# Patient Record
Sex: MALE | Race: WHITE | NOT HISPANIC OR LATINO | Employment: OTHER | URBAN - METROPOLITAN AREA
[De-identification: names, ages, dates, MRNs, and addresses within clinical notes are randomized per-mention and may not be internally consistent; named-entity substitution may affect disease eponyms.]

---

## 2017-01-05 NOTE — TELEPHONE ENCOUNTER
----- Message from Casper Duncan sent at 1/5/2017 12:03 PM CST -----  Contact: [phone]/self  Pt would like to speak with nurse regarding medication refills.   Please advise

## 2017-01-06 RX ORDER — METOPROLOL SUCCINATE 25 MG/1
25 TABLET, EXTENDED RELEASE ORAL DAILY
Qty: 30 TABLET | Refills: 11 | Status: SHIPPED | OUTPATIENT
Start: 2017-01-06 | End: 2017-03-28 | Stop reason: SDUPTHER

## 2017-01-06 RX ORDER — LISINOPRIL 10 MG/1
10 TABLET ORAL DAILY
Qty: 30 TABLET | Refills: 11 | Status: ON HOLD | OUTPATIENT
Start: 2017-01-06 | End: 2017-03-09 | Stop reason: HOSPADM

## 2017-01-09 ENCOUNTER — TELEPHONE (OUTPATIENT)
Dept: FAMILY MEDICINE | Facility: CLINIC | Age: 57
End: 2017-01-09

## 2017-01-09 NOTE — TELEPHONE ENCOUNTER
----- Message from Jenna Ladd sent at 1/9/2017  9:57 AM CST -----  Contact: [phone] Donita miller/ [facility]  Requesting to speak with you regarding verifying drug.    Would like to know if the drug is metoprolol XL or metoprolol plain.    Please advise.

## 2017-01-09 NOTE — TELEPHONE ENCOUNTER
Call returned to [facility] pharmacy.  Nurse spoke with Donita to confirm script for pt's Metformin.

## 2017-01-22 DIAGNOSIS — K51.30 ULCERATIVE RECTOSIGMOIDITIS WITHOUT COMPLICATION: ICD-10-CM

## 2017-01-22 RX ORDER — MESALAMINE 375 MG/1
CAPSULE, EXTENDED RELEASE ORAL
Qty: 120 CAPSULE | Refills: 6 | Status: SHIPPED | OUTPATIENT
Start: 2017-01-22 | End: 2018-10-30

## 2017-02-09 RX ORDER — GLIPIZIDE 10 MG/1
TABLET ORAL
Qty: 60 TABLET | Refills: 11 | Status: SHIPPED | OUTPATIENT
Start: 2017-02-09 | End: 2018-02-28 | Stop reason: SDUPTHER

## 2017-02-17 ENCOUNTER — HOSPITAL ENCOUNTER (OUTPATIENT)
Dept: DIABETES | Facility: HOSPITAL | Age: 57
Discharge: HOME | End: 2017-02-17

## 2017-02-20 ENCOUNTER — TELEPHONE (OUTPATIENT)
Dept: FAMILY MEDICINE | Facility: CLINIC | Age: 57
End: 2017-02-20

## 2017-02-20 NOTE — TELEPHONE ENCOUNTER
----- Message from Patsy Denise sent at 2/20/2017  9:02 AM CST -----  Contact: self, [phone]  Patient requests to be seen sooner than the next available appointment, states he is having uneasiness feeling on his chest and states his blood pressure medication was changed. Please advise.

## 2017-02-20 NOTE — TELEPHONE ENCOUNTER
Call returned to pt.  Pt states that on yesterday he had a ticklish feeling in chest that lasted only 5-10 mins.  Pt stated that he hasn't had any pain.  Pt was offered an appt on today but he declined and stated that he'll call on tomorrow to get visit with Dr. Alexis.

## 2017-02-21 NOTE — TELEPHONE ENCOUNTER
Call returned to pt.  Pt stated that he has had a little chest pains a few days ago and would like to be seen as soon as possible.  Pt has an appt with Dr. Pitts on tomorrow.

## 2017-02-21 NOTE — TELEPHONE ENCOUNTER
----- Message from Jenny Lea sent at 2/21/2017  1:33 PM CST -----  Contact: [phone]/ self   Patient would like to be seen sooner than the next available appointment. Please advise.

## 2017-02-22 ENCOUNTER — OFFICE VISIT (OUTPATIENT)
Dept: INTERNAL MEDICINE | Facility: CLINIC | Age: 57
End: 2017-02-22
Payer: MEDICAID

## 2017-02-22 ENCOUNTER — HOSPITAL ENCOUNTER (INPATIENT)
Facility: HOSPITAL | Age: 57
LOS: 1 days | Discharge: HOME | DRG: 311 | End: 2017-02-23
Attending: EMERGENCY MEDICINE | Admitting: INTERNAL MEDICINE
Payer: MEDICAID

## 2017-02-22 VITALS
BODY MASS INDEX: 25.97 KG/M2 | SYSTOLIC BLOOD PRESSURE: 153 MMHG | HEART RATE: 88 BPM | DIASTOLIC BLOOD PRESSURE: 81 MMHG | HEIGHT: 66 IN | WEIGHT: 161.63 LBS | OXYGEN SATURATION: 97 %

## 2017-02-22 DIAGNOSIS — R07.9 CHEST PAIN: ICD-10-CM

## 2017-02-22 DIAGNOSIS — R07.9 CHEST PAIN, UNSPECIFIED TYPE: Primary | ICD-10-CM

## 2017-02-22 DIAGNOSIS — R07.2 PRECORDIAL PAIN: Primary | ICD-10-CM

## 2017-02-22 DIAGNOSIS — I20.0 UNSTABLE ANGINA: ICD-10-CM

## 2017-02-22 DIAGNOSIS — I10 ESSENTIAL HYPERTENSION: ICD-10-CM

## 2017-02-22 DIAGNOSIS — E11.9 DIABETES MELLITUS TYPE 2, NONINSULIN DEPENDENT: ICD-10-CM

## 2017-02-22 LAB
ALBUMIN SERPL BCP-MCNC: 4.1 G/DL
ALP SERPL-CCNC: 90 U/L
ALT SERPL W/O P-5'-P-CCNC: 16 U/L
AMPHET+METHAMPHET UR QL: NEGATIVE
ANION GAP SERPL CALC-SCNC: 13 MMOL/L
APTT BLDCRRT: 27.7 SEC
AST SERPL-CCNC: 12 U/L
BARBITURATES UR QL SCN>200 NG/ML: NEGATIVE
BASOPHILS # BLD AUTO: 0.04 K/UL
BASOPHILS NFR BLD: 0.4 %
BENZODIAZ UR QL SCN>200 NG/ML: NEGATIVE
BILIRUB SERPL-MCNC: 0.4 MG/DL
BUN SERPL-MCNC: 12 MG/DL
BZE UR QL SCN: NEGATIVE
CALCIUM SERPL-MCNC: 9.7 MG/DL
CANNABINOIDS UR QL SCN: NEGATIVE
CHLORIDE SERPL-SCNC: 100 MMOL/L
CK MB SERPL-MCNC: 1.9 NG/ML
CK MB SERPL-RTO: 2.8 %
CK SERPL-CCNC: 68 U/L
CK SERPL-CCNC: 68 U/L
CO2 SERPL-SCNC: 24 MMOL/L
CREAT SERPL-MCNC: 1 MG/DL
CREAT UR-MCNC: 30 MG/DL
DIFFERENTIAL METHOD: ABNORMAL
EOSINOPHIL # BLD AUTO: 0.2 K/UL
EOSINOPHIL NFR BLD: 2.2 %
ERYTHROCYTE [DISTWIDTH] IN BLOOD BY AUTOMATED COUNT: 12.9 %
EST. GFR  (AFRICAN AMERICAN): >60 ML/MIN/1.73 M^2
EST. GFR  (NON AFRICAN AMERICAN): >60 ML/MIN/1.73 M^2
ETHANOL UR-MCNC: <10 MG/DL
GLUCOSE SERPL-MCNC: 216 MG/DL
HCT VFR BLD AUTO: 39.4 %
HGB BLD-MCNC: 13 G/DL
INR PPP: 1
LYMPHOCYTES # BLD AUTO: 2.4 K/UL
LYMPHOCYTES NFR BLD: 27.3 %
MCH RBC QN AUTO: 26.4 PG
MCHC RBC AUTO-ENTMCNC: 33 %
MCV RBC AUTO: 80 FL
METHADONE UR QL SCN>300 NG/ML: NEGATIVE
MONOCYTES # BLD AUTO: 0.6 K/UL
MONOCYTES NFR BLD: 6.8 %
NEUTROPHILS # BLD AUTO: 5.6 K/UL
NEUTROPHILS NFR BLD: 63.1 %
OPIATES UR QL SCN: NEGATIVE
PCP UR QL SCN>25 NG/ML: NEGATIVE
PLATELET # BLD AUTO: 287 K/UL
PMV BLD AUTO: 10.5 FL
POCT GLUCOSE: 141 MG/DL (ref 70–110)
POTASSIUM SERPL-SCNC: 3.9 MMOL/L
PROT SERPL-MCNC: 8.4 G/DL
PROTHROMBIN TIME: 10.5 SEC
RBC # BLD AUTO: 4.92 M/UL
SODIUM SERPL-SCNC: 137 MMOL/L
TOXICOLOGY INFORMATION: NORMAL
TROPONIN I SERPL DL<=0.01 NG/ML-MCNC: <0.006 NG/ML
TROPONIN I SERPL DL<=0.01 NG/ML-MCNC: <0.006 NG/ML
TSH SERPL DL<=0.005 MIU/L-ACNC: 2.53 UIU/ML
WBC # BLD AUTO: 8.94 K/UL

## 2017-02-22 PROCEDURE — 82553 CREATINE MB FRACTION: CPT

## 2017-02-22 PROCEDURE — 63600175 PHARM REV CODE 636 W HCPCS: Performed by: STUDENT IN AN ORGANIZED HEALTH CARE EDUCATION/TRAINING PROGRAM

## 2017-02-22 PROCEDURE — 84443 ASSAY THYROID STIM HORMONE: CPT

## 2017-02-22 PROCEDURE — G0378 HOSPITAL OBSERVATION PER HR: HCPCS

## 2017-02-22 PROCEDURE — 84484 ASSAY OF TROPONIN QUANT: CPT

## 2017-02-22 PROCEDURE — 25000003 PHARM REV CODE 250: Performed by: STUDENT IN AN ORGANIZED HEALTH CARE EDUCATION/TRAINING PROGRAM

## 2017-02-22 PROCEDURE — 99213 OFFICE O/P EST LOW 20 MIN: CPT | Mod: PBBFAC,PO | Performed by: INTERNAL MEDICINE

## 2017-02-22 PROCEDURE — 93005 ELECTROCARDIOGRAM TRACING: CPT

## 2017-02-22 PROCEDURE — 84484 ASSAY OF TROPONIN QUANT: CPT | Mod: 91

## 2017-02-22 PROCEDURE — 80307 DRUG TEST PRSMV CHEM ANLYZR: CPT

## 2017-02-22 PROCEDURE — 93010 ELECTROCARDIOGRAM REPORT: CPT | Mod: ,,, | Performed by: INTERNAL MEDICINE

## 2017-02-22 PROCEDURE — 99285 EMERGENCY DEPT VISIT HI MDM: CPT | Mod: 27

## 2017-02-22 PROCEDURE — 85730 THROMBOPLASTIN TIME PARTIAL: CPT

## 2017-02-22 PROCEDURE — 80053 COMPREHEN METABOLIC PANEL: CPT

## 2017-02-22 PROCEDURE — 99999 PR PBB SHADOW E&M-EST. PATIENT-LVL III: CPT | Mod: PBBFAC,,, | Performed by: INTERNAL MEDICINE

## 2017-02-22 PROCEDURE — 11000001 HC ACUTE MED/SURG PRIVATE ROOM

## 2017-02-22 PROCEDURE — 93010 ELECTROCARDIOGRAM REPORT: CPT | Mod: 76,,, | Performed by: INTERNAL MEDICINE

## 2017-02-22 PROCEDURE — 85025 COMPLETE CBC W/AUTO DIFF WBC: CPT

## 2017-02-22 PROCEDURE — 36415 COLL VENOUS BLD VENIPUNCTURE: CPT

## 2017-02-22 PROCEDURE — 85610 PROTHROMBIN TIME: CPT

## 2017-02-22 PROCEDURE — 99499 UNLISTED E&M SERVICE: CPT | Mod: S$PBB,,, | Performed by: INTERNAL MEDICINE

## 2017-02-22 RX ORDER — METOPROLOL SUCCINATE 25 MG/1
25 TABLET, EXTENDED RELEASE ORAL DAILY
Status: CANCELLED | OUTPATIENT
Start: 2017-02-23

## 2017-02-22 RX ORDER — ENOXAPARIN SODIUM 150 MG/ML
1 INJECTION SUBCUTANEOUS ONCE
Status: COMPLETED | OUTPATIENT
Start: 2017-02-22 | End: 2017-02-22

## 2017-02-22 RX ORDER — INSULIN ASPART 100 [IU]/ML
1-10 INJECTION, SOLUTION INTRAVENOUS; SUBCUTANEOUS
Status: DISCONTINUED | OUTPATIENT
Start: 2017-02-22 | End: 2017-02-23 | Stop reason: HOSPADM

## 2017-02-22 RX ORDER — IBUPROFEN 200 MG
24 TABLET ORAL
Status: DISCONTINUED | OUTPATIENT
Start: 2017-02-22 | End: 2017-02-23 | Stop reason: HOSPADM

## 2017-02-22 RX ORDER — NITROGLYCERIN 0.4 MG/1
0.4 TABLET SUBLINGUAL EVERY 5 MIN PRN
Status: DISCONTINUED | OUTPATIENT
Start: 2017-02-22 | End: 2017-02-23 | Stop reason: HOSPADM

## 2017-02-22 RX ORDER — CLOPIDOGREL BISULFATE 75 MG/1
75 TABLET ORAL DAILY
Status: DISCONTINUED | OUTPATIENT
Start: 2017-02-22 | End: 2017-02-23 | Stop reason: HOSPADM

## 2017-02-22 RX ORDER — LISINOPRIL 5 MG/1
10 TABLET ORAL DAILY
Status: DISCONTINUED | OUTPATIENT
Start: 2017-02-23 | End: 2017-02-23 | Stop reason: HOSPADM

## 2017-02-22 RX ORDER — PANTOPRAZOLE SODIUM 40 MG/1
40 TABLET, DELAYED RELEASE ORAL DAILY
Status: DISCONTINUED | OUTPATIENT
Start: 2017-02-23 | End: 2017-02-23 | Stop reason: HOSPADM

## 2017-02-22 RX ORDER — GLUCAGON 1 MG
1 KIT INJECTION
Status: DISCONTINUED | OUTPATIENT
Start: 2017-02-22 | End: 2017-02-23 | Stop reason: HOSPADM

## 2017-02-22 RX ORDER — ATORVASTATIN CALCIUM 20 MG/1
20 TABLET, FILM COATED ORAL NIGHTLY
Status: DISCONTINUED | OUTPATIENT
Start: 2017-02-22 | End: 2017-02-23 | Stop reason: HOSPADM

## 2017-02-22 RX ORDER — IBUPROFEN 200 MG
16 TABLET ORAL
Status: DISCONTINUED | OUTPATIENT
Start: 2017-02-22 | End: 2017-02-23 | Stop reason: HOSPADM

## 2017-02-22 RX ADMIN — CLOPIDOGREL BISULFATE 75 MG: 75 TABLET ORAL at 10:02

## 2017-02-22 RX ADMIN — ENOXAPARIN SODIUM 80 MG: 100 INJECTION SUBCUTANEOUS at 09:02

## 2017-02-22 RX ADMIN — ATORVASTATIN CALCIUM 20 MG: 20 TABLET, FILM COATED ORAL at 10:02

## 2017-02-22 NOTE — ED NOTES
APPEARANCE: Alert, oriented and in no acute distress.  CARDIAC: Normal rate and rhythm, no murmur heard.   PERIPHERAL VASCULAR: peripheral pulses present. Normal cap refill. No edema. Warm to touch.    RESPIRATORY:Normal rate and effort, breath sounds clear bilaterally throughout chest. Respirations are equal and unlabored no obvious signs of distress.  GASTRO: soft, bowel sounds normal, no tenderness, no abdominal distention.  MUSC: Full ROM. No bony tenderness or soft tissue tenderness. No obvious deformity. Left upper chest pain x 3 days.   SKIN: Skin is warm and dry, normal skin turgor, mucous membranes moist.  NEURO: 5/5 strength major flexors/extensors bilaterally. Sensory intact to light touch bilaterally. Torrie coma scale: eyes open spontaneously-4, oriented & converses-5, obeys commands-6. No neurological abnormalities.   MENTAL STATUS: awake, alert and aware of environment.  EYE: PERRL, both eyes: pupils brisk and reactive to light. Normal size.  ENT: EARS: no obvious drainage. NOSE: no active bleeding.

## 2017-02-22 NOTE — ED TRIAGE NOTES
"Pt presents to ED today c/o left upper chest pain x 3 days. Reports started with numbness now "pressure" pain. Denies N/V or diaphoresis.   "

## 2017-02-22 NOTE — IP AVS SNAPSHOT
[facility]  [address]  [place] [address]  Phone: [phone]           Patient Discharge Instructions     Our goal is to set you up for success. This packet includes information on your condition, medications, and your home care. It will help you to care for yourself so you don't get sicker and need to go back to the hospital.     Please ask your nurse if you have any questions.        There are many details to remember when preparing to leave the hospital. Here is what you will need to do:    1. Take your medicine. If you are prescribed medications, review your Medication List in the following pages. You may have new medications to  at the pharmacy and others that you'll need to stop taking. Review the instructions for how and when to take your medications. Talk with your doctor or nurses if you are unsure of what to do.     2. Go to your follow-up appointments. Specific follow-up information is listed in the following pages. Your may be contacted by a transition nurse or clinical provider about future appointments. Be sure we have all of the phone numbers to reach you, if needed. Please contact your provider's office if you are unable to make an appointment.     3. Watch for warning signs. Your doctor or nurse will give you detailed warning signs to watch for and when to call for assistance. These instructions may also include educational information about your condition. If you experience any of warning signs to your health, call your doctor.               ** Verify the list of medication(s) below is accurate and up to date. Carry this with you in case of emergency. If your medications have changed, please notify your healthcare provider.             Medication List      START taking these medications        Additional Info                      atorvastatin 20 MG tablet   Commonly known as:  LIPITOR   Quantity:  90 tablet   Refills:  3   Dose:  20 mg    Last time this was given:   20 mg on 2/22/2017 10:00 PM   Instructions:  Take 1 tablet (20 mg total) by mouth every evening.     Begin Date    AM    Noon    PM    Bedtime       clopidogrel 75 mg tablet   Commonly known as:  PLAVIX   Quantity:  30 tablet   Refills:  11   Dose:  75 mg    Last time this was given:  75 mg on 2/23/2017  8:27 AM   Instructions:  Take 1 tablet (75 mg total) by mouth once daily.     Begin Date    AM    Noon    PM    Bedtime       nitroGLYCERIN 0.4 MG SL tablet   Commonly known as:  NITROSTAT   Quantity:  30 tablet   Refills:  1   Dose:  0.4 mg    Instructions:  Place 1 tablet (0.4 mg total) under the tongue every 5 (five) minutes as needed for Chest pain.     Begin Date    AM    Noon    PM    Bedtime         CONTINUE taking these medications        Additional Info                      APRISO 0.375 gram Cp24   Quantity:  120 capsule   Refills:  6   Generic drug:  mesalamine    Instructions:  TAKE FOUR CAPSULES BY MOUTH ONCE DAILY     Begin Date    AM    Noon    PM    Bedtime       blood sugar diagnostic Strp   Commonly known as:  BLOOD GLUCOSE TEST   Quantity:  100 strip   Refills:  11    Instructions:  Dispense 1 meter and 100 test strips (insurance covered brand). Test sugar daily     Begin Date    AM    Noon    PM    Bedtime       glipiZIDE 10 MG tablet   Commonly known as:  GLUCOTROL   Quantity:  60 tablet   Refills:  11    Instructions:  TAKE ONE TABLET BY MOUTH TWICE DAILY WITH MEALS     Begin Date    AM    Noon    PM    Bedtime       lisinopril 10 MG tablet   Quantity:  30 tablet   Refills:  11   Dose:  10 mg    Last time this was given:  10 mg on 2/23/2017  8:27 AM   Instructions:  Take 1 tablet (10 mg total) by mouth once daily.     Begin Date    AM    Noon    PM    Bedtime       metformin 1000 MG tablet   Commonly known as:  GLUCOPHAGE   Quantity:  60 tablet   Refills:  11   Dose:  1000 mg    Instructions:  Take 1 tablet (1,000 mg total) by mouth 2 (two) times daily with meals.     Begin Date    AM    Noon     PM    Bedtime       metoprolol succinate 25 MG 24 hr tablet   Commonly known as:  TOPROL-XL   Quantity:  30 tablet   Refills:  11   Dose:  25 mg    Instructions:  Take 1 tablet (25 mg total) by mouth once daily.     Begin Date    AM    Noon    PM    Bedtime       pantoprazole 40 MG tablet   Commonly known as:  PROTONIX   Refills:  0    Last time this was given:  40 mg on 2/23/2017  8:27 AM     Begin Date    AM    Noon    PM    Bedtime         STOP taking these medications     omeprazole 40 MG capsule   Commonly known as:  PRILOSEC            Where to Get Your Medications      You can get these medications from any pharmacy     Bring a paper prescription for each of these medications     atorvastatin 20 MG tablet    clopidogrel 75 mg tablet    nitroGLYCERIN 0.4 MG SL tablet                  Please bring to all follow up appointments:    1. A copy of your discharge instructions.  2. All medicines you are currently taking in their original bottles.  3. Identification and insurance card.    Please arrive 15 minutes ahead of scheduled appointment time.    Please call 24 hours in advance if you must reschedule your appointment and/or time.        Follow-up Information     Follow up with Facundo Alexis MD.    Specialty:  Family Medicine    Why:  message left for follow up within a week    Contact information:    [address]  [address]  [place] [id number]  [phone]          Follow up with Pamela Espitia MD In 1 week.    Specialty:  Cardiology    Contact information:    [address]  [address]  [place] [id number]  [phone]          Discharge Instructions     Future Orders    Activity as tolerated     Call MD for:  difficulty breathing or increased cough     Call MD for:  persistent dizziness, light-headedness, or visual disturbances     Call MD for:  severe uncontrolled pain     Diet Cardiac     Diet Diabetic 1800 Calories       Discharge References/Attachments     ANGINA, DISCHARGE INSTRUCTIONS FOR  "(ENGLISH)    ANGINA, UNSTABLE (ENGLISH)    ANGINA, WHAT IS (ENGLISH)    CHEST PAIN, UNCERTAIN CAUSE (CHILD) (ENGLISH)    CHEST PAIN, UNCERTAIN CAUSE (ENGLISH)    ULCERATIVE COLITIS (ENGLISH)    HEART ATTACK OR ANGINA, RECOGNIZING A (ENGLISH)    ATORVASTATIN TABLETS (ENGLISH)    CLOPIDOGREL BISULFATE ORAL TABLET (ENGLISH)    NITROGLYCERIN SUBLINGUAL TABLETS (ENGLISH)        Primary Diagnosis     Your primary diagnosis was:  Unstable Chest Pain Due To Insufficient Blood Supply To Heart      Admission Information     Date & Time Provider Department CSN    2/22/2017  1:46 PM Willis Whitaker MD [facility] [id number]      Care Providers     Provider Role Specialty Primary office phone    Willis Whitaker MD Attending Provider Internal Medicine [phone]    Fercho Beck MD Team Attending  Internal Medicine [phone]    Willis Whitaker MD Team Attending  Internal Medicine [phone]      Your Vitals Were     BP Pulse Temp Resp Height Weight    137/74 (BP Location: Left arm, Patient Position: Sitting, BP Method: Automatic) 65 96.9 °F (36.1 °C) (Oral) 20 5' 6" (1.676 m) 73 kg (161 lb)    SpO2 BMI             100% 25.99 kg/m2         Recent Lab Values        12/11/2015 11/15/2016 2/23/2017                     9:55 AM 11:38 AM  7:11 AM         A1C 8.7 (H) 12.2 (H) 9.5 (H)         Comment for A1C at 11:38 AM on 11/15/2016:  According to ADA guidelines, hemoglobin A1C <7.0% represents  optimal control in non-pregnant diabetic patients.  Different  metrics may apply to specific populations.   Standards of Medical Care in Diabetes - 2016.  For the purpose of screening for the presence of diabetes:  <5.7%     Consistent with the absence of diabetes  5.7-6.4%  Consistent with increasing risk for diabetes   (prediabetes)  >or=6.5%  Consistent with diabetes  Currently no consensus exists for use of hemoglobin A1C  for diagnosis of diabetes for children.      Comment for A1C at  7:11 AM on 2/23/2017:  " According to ADA guidelines, hemoglobin A1C <7.0% represents  optimal control in non-pregnant diabetic patients.  Different  metrics may apply to specific populations.   Standards of Medical Care in Diabetes - 2016.  For the purpose of screening for the presence of diabetes:  <5.7%     Consistent with the absence of diabetes  5.7-6.4%  Consistent with increasing risk for diabetes   (prediabetes)  >or=6.5%  Consistent with diabetes  Currently no consensus exists for use of hemoglobin A1C  for diagnosis of diabetes for children.        Allergies as of 2/23/2017        Reactions    Aspirin       [facility]     [facility] Nurse Care Line - 24/7 Assistance  Unless otherwise directed by your provider, please contact [facility], our nurse care line that is available for 24/7 assistance.     Registered nurses in the [facility] provide clinical advisement, health education, appointment booking, and other advisory services.  Call for this free service at [phone].        Advance Directives     An advance directive is a document which, in the event you are no longer able to make decisions for yourself, tells your healthcare team what kind of treatment you do or do not want to receive, or who you would like to make those decisions for you.  If you do not currently have an advance directive, [facility] encourages you to create one.  For more information call:  [phone] ([phone]), [phone] ([phone]),  or log on to [email or web address].        Smoking Cessation     If you would like to quit smoking:   You may be eligible for free services if you are a [place] resident and started smoking cigarettes before September 1, 1988.  Call the Smoking Cessation Trust (SCT) toll free at (803) 054-4869 or (208) 589-3509.   Call 6-084-QUIT-NOW if you do not meet the above criteria.            Language Assistance Services     ATTENTION: Language assistance services are available, free of  charge. Please call [phone].      ATENCIÓN: Si habla español, tiene a posadas disposición servicios gratuitos de asistencia lingüística. Llame al [phone].     CHÚ Ý: N?u b?n nói Ti?ng Vi?t, có các d?ch v? h? tr? ngôn ng? mi?n phí dành cho b?n. G?i s? [phone].        Diabetes Discharge Instructions                                    [facility] complies with applicable Federal civil rights laws and does not discriminate on the basis of race, color, national origin, age, disability, or sex.

## 2017-02-22 NOTE — PROGRESS NOTES
Subjective:    Portions of this note are generated with voice recognition software. Typographical errors may exist.   Patient ID: Michoacano Lind is a 56 y.o. male.    Chief Complaint: Chest Pain (ongoing for 3 days)    HPI: 56-year-old male with uncontrolled diabetes here for complains of pressure sensation in the left precordial area on and off for the past 3 days.  It is worsened on exertion and relieved with rest.  The patient has been doing work around his house like painting for the past month.  He relates the sensation to the exertion.  No history of nausea or vomiting no history of diaphoresis.  No history of radiating pain.  No history of dizziness or syncope.  No history of shortness of breath.    Review of patient's allergies indicates:   Allergen Reactions    Aspirin      Past Medical History   Diagnosis Date    Diabetes mellitus, type II     HTN (hypertension)     Ulcerative colitis      Past Surgical History   Procedure Laterality Date    Cataract extraction Bilateral 2013    Colonoscopy N/A 1/22/2016     Procedure: COLONOSCOPY;  Surgeon: Aristeo Lynn Jr., MD;  Location: [facility];  Service: Endoscopy;  Laterality: N/A;     Family History   Problem Relation Age of Onset    Coronary artery disease Mother 75    Coronary artery disease Father 55    Coronary artery disease Brother 59    Diabetes Brother     Prostate cancer Neg Hx     Colon cancer Neg Hx      Social History     Social History    Marital status:      Spouse name: N/A    Number of children: N/A    Years of education: N/A     Occupational History    Not on file.     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   HEAD: Normocephalic, atraumatic.  EYES: PERRL. EOMI. Conjunctivae noninjected.  EARS: TM's intact. Light reflex normal. No retraction or perforation  NOSE: Mucosa pink. Airway clear.  MOUTH & THROAT: No tonsillar enlargement. No pharyngeal erythema or exudate.   NECK: Supple with no cervical lymphadenopathy. No carotid bruits. No thyromegaly  CHEST: Good inspiratory effort. Lungs clear to auscultation with no wheezes or crackles.  CARDIOVASCULAR: Normal S1, S2. No rubs, murmurs, or gallops.  ABDOMEN: Bowel sounds normal. Not distended. Soft. No tenderness or masses. No organomegaly.  EXTREMITIES: No edema, cyanosis, or clubbing.     Assessment:     Chest pain: And needs to be ruled out for acute coronary syndrome.  Will refer to emergency room for further management.  Informed the emergency room

## 2017-02-22 NOTE — H&P
[facility] Internal Medicine History and Physical - Resident Note    Admitting Team: [facility] Medicine Hospitalist Team B  Attending Physician: Sherman   Resident: Henry  Interns: Juan     Date of Admit: 2017    Chief Complaint     Intermittent chest pain x 3 days    Subjective:      History of Present Illness:  Michoacano Lind is a 56 y.o.  male with uncontrolled diabetes mellitus - type 2 and HTN was in his USOH (performs all adls, very active) until three days ago he noticed he would have left sided chest pain that he would describe as a heaviness and pressure pain. Pain would not radiate to jaw or left arm. Pain occurred with exertion and while at rest. Pain would not last long (he was unable to give exact time). Pain was not reproducible or did not worsen with movement or inspiration. Pain was not associated with shortness of breath, nausea or vomiting. He denies recent airplane trips. He denies swelling or pain to calves. He has never had this pain before so he saw a primary care doctor who sent him to the ED. His father had a history of a MI at age 55 which he  from. His brother has CAD at age of 59. He is a former smoker (quit many years ago). At time of presentation, patient did not have any chest pain.     Past Medical History:  Past Medical History   Diagnosis Date    Diabetes mellitus, type II     HTN (hypertension)     Ulcerative colitis        Past Surgical History:  Past Surgical History   Procedure Laterality Date    Cataract extraction Bilateral     Colonoscopy N/A 2016     Procedure: COLONOSCOPY;  Surgeon: Aristeo Lynn Jr., MD;  Location: [facility];  Service: Endoscopy;  Laterality: N/A;       Allergies:  Review of patient's allergies indicates:   Allergen Reactions    Aspirin        Home Medications:  Prior to Admission medications    Medication Sig Start Date End Date Taking? Authorizing Provider   APRISO 0.375 gram Cp24 TAKE FOUR CAPSULES BY MOUTH ONCE DAILY 17    Aristeo Lynn Jr., MD   blood sugar diagnostic (BLOOD GLUCOSE TEST) Strp Dispense 1 meter and 100 test strips (insurance covered brand). Test sugar daily 16   Facundo Alexis MD   glipiZIDE (GLUCOTROL) 10 MG tablet TAKE ONE TABLET BY MOUTH TWICE DAILY WITH MEALS 17   Facundo Alexis MD   lisinopril 10 MG tablet Take 1 tablet (10 mg total) by mouth once daily. 17   Facundo Alexis MD   metformin (GLUCOPHAGE) 1000 MG tablet Take 1 tablet (1,000 mg total) by mouth 2 (two) times daily with meals. 16   Facundo Alexis MD   metoprolol succinate (TOPROL-XL) 25 MG 24 hr tablet Take 1 tablet (25 mg total) by mouth once daily. 17   Facundo Alexis MD   omeprazole (PRILOSEC) 40 MG capsule Take 1 capsule (40 mg total) by mouth once daily. 16  Aristeo Lynn Jr., MD   pantoprazole (PROTONIX) 40 MG tablet  10/31/16   Historical Provider, MD       Family History:  Family History   Problem Relation Age of Onset    Coronary artery disease Mother 75    Coronary artery disease Father 55    Coronary artery disease Brother 59    Diabetes Brother     Prostate cancer Neg Hx     Colon cancer Neg Hx        Social History:  Social History   Substance Use Topics    Smoking status: Former Smoker    Smokeless tobacco: None    Alcohol use 0.0 oz/week     0 Standard drinks or equivalent per week      Comment: occasionally       Review of Systems:  Pertinent items are noted in HPI. All other systems are reviewed and are negative.    Health Maintaince :   Primary Care Physician: Vanesa  Immunizations:   TDap is up to date, .  Influenza is up to date, .  Pneumovax is up to date, .  Cancer Screening:  Colonoscopy: is up to date.      Objective:   Last 24 Hour Vital Signs:  BP  Min: 127/69  Max: 153/81  Temp  Av.1 °F (36.7 °C)  Min: 98.1 °F (36.7 °C)  Max: 98.1 °F (36.7 °C)  Pulse  Av  Min: 75  Max: 92  Resp  Av  Min: 18  Max: 18  SpO2  Av %  Min: 97 %   "Max: 100 %  Height  Av' 6" (167.6 cm)  Min: 5' 6" (167.6 cm)  Max: 5' 6" (167.6 cm)  Weight  Av.2 kg (161 lb 4.8 oz)  Min: 73 kg (161 lb)  Max: 73.3 kg (161 lb 9.6 oz)  Body mass index is 25.99 kg/(m^2).       Physical Examination:  Gen: pleasant, sitting up in bed, NAD  HEENT: NCAT, MMM, EOMI, PERRL, JVP 5 cm  CV: RRR, no murmurs  Resp: CTA B, no increased wob, no crackles  Abd: soft, NT/ND  Ext: compartments soft, NT, no swelling, 2+ dp pulses  Skin: no rashes    Laboratory:  Most Recent Data:  CBC: Lab Results   Component Value Date    WBC 8.94 2017    HGB 13.0 (L) 2017    HCT 39.4 (L) 2017     2017    MCV 80 (L) 2017    RDW 12.9 2017     BMP: Lab Results   Component Value Date     2017    K 3.9 2017     2017    CO2 24 2017    BUN 12 2017    CREATININE 1.0 2017     (H) 2017    CALCIUM 9.7 2017     LFTs: Lab Results   Component Value Date    PROT 8.4 2017    ALBUMIN 4.1 2017    BILITOT 0.4 2017    AST 12 2017    ALKPHOS 90 2017    ALT 16 2017     Coags:   Lab Results   Component Value Date    INR 1.0 2017     FLP: Lab Results   Component Value Date    CHOL 215 (H) 11/15/2016    HDL 38 (L) 11/15/2016    LDLCALC 123.6 11/15/2016    TRIG 267 (H) 11/15/2016    CHOLHDL 17.7 (L) 11/15/2016     DM: Lab Results   Component Value Date    HGBA1C 12.2 (H) 11/15/2016    HGBA1C 8.7 (H) 2015    LDLCALC 123.6 11/15/2016    CREATININE 1.0 2017     Thyroid: Lab Results   Component Value Date    TSH 1.678 11/15/2016     Cardiac: Lab Results   Component Value Date    TROPONINI <0.006 2017     Other Results:  EKG (my interpretation): normal sinus rhythm, no st elevation/depressions, no twi, twave flattening in v3    Radiology:  CXR: no opacities, no pneumothorax, no increased pulmonary vasculature.      Assessment:     Michoacano Lind is a 56 y.o. male with " unstable angina   Plan:   Unstable Angina  - Hx of chest pain for 3 days intermittent, occurs at rest and exertion. Describes as pressure and heaviness to left side of chest. Has strong family history of cardiac disease.  - Troponin negative and EKG with flattened twave in v3, no st elevation/dep. Trend troponin and EKGs.   - Unable to take ASA 2/2 gastrointestinal intolerance. Will start on Plavix 75 mg daily per ACC/AHA guidelines and Lovenox 1 mg/kg BID. Cont BB and ACEi and add statin.   - Will consult cardiology given unstable angina. Follow up recs, will likely will be able to stress in AM if troponin remains negative and patient remains chest pain free.     HTN  - Continue Lisinopril 10 mg daily and Toprol-XL 25 mg daily.   - Add/titrate medications as needed    Diabetes Mellitus Type 2  - Last A1C 12, repeat ordered. Holding home Glipizide and Metformin. Not on insulin  - SSI and POCT glucose checks    Ulcerative Colitis  - No acute issues. Follows with Dr. Lynn  - Already took Mesalamine this morning, continue. Continue Omeprazole 40 mg daily    Microcytic Anemia  - Hgb 13, baseline 12-13  - Iron studies ordered, f/u results    Dispo: admit patient for unstable angina, discharge pending card eval and stress test vs cath         Code Status:     Full    Tierney Figueroa  [facility] Internal Medicine HO-2  [facility] Hospitalist Service    [facility] Medicine Hospitalist Pager numbers:   [facility] Hospitalist Medicine Team A (Blas/Ewa): [phone]  [facility] Hospitalist Medicine Team B (Kiran/Sherman):  [phone]

## 2017-02-22 NOTE — ED PROVIDER NOTES
"Encounter Date: 2/22/2017       History     Chief Complaint   Patient presents with    Chest Pain     left sided chest pain, feels "heavy" x 2-3 days, denies palliation/provocation, no n/v or cold sweats     Review of patient's allergies indicates:   Allergen Reactions    Aspirin      The history is provided by the patient.        57 y/o male with history of DM, HTN, Ulcerative Colitis, presents with complaint of recurrent episodes of chest pain the past 2-3 days. The chest pain is left sided and feels like a "heaviness". It lasts less than 5 minutes.  He saw Dr. Pike today and related that it was exertion related and relieved by rest. He also states that the pain occurs at rest. The maximum intensity is 2/10. The current level is 0/10. He has several episodes per day. No known history of heart disease. He has never had a stress test. Dr. Pike referred the patient to the ER for further evaluation. Patient states he does not take Aspirin due to chronic GI problems.  Past Medical History   Diagnosis Date    Diabetes mellitus, type II     HTN (hypertension)     Ulcerative colitis      No past medical history pertinent negatives.  Past Surgical History   Procedure Laterality Date    Cataract extraction Bilateral 2013    Colonoscopy N/A 1/22/2016     Procedure: COLONOSCOPY;  Surgeon: Aristeo Lynn Jr., MD;  Location: [facility];  Service: Endoscopy;  Laterality: N/A;     Family History   Problem Relation Age of Onset    Coronary artery disease Mother 75    Coronary artery disease Father 55    Coronary artery disease Brother 59    Diabetes Brother     Prostate cancer Neg Hx     Colon cancer Neg Hx      Social History   Substance Use Topics    Smoking status: Former Smoker    Smokeless tobacco: None    Alcohol use 0.0 oz/week     0 Standard drinks or equivalent per week      Comment: occasionally     Review of Systems   Constitutional: Negative for fever.   HENT: Negative for facial " swelling and sore throat.    Eyes: Negative for discharge and redness.   Respiratory: Positive for chest tightness. Negative for cough and shortness of breath.    Cardiovascular: Positive for chest pain. Negative for leg swelling.   Gastrointestinal: Negative for abdominal pain, diarrhea, nausea and vomiting.   Genitourinary: Negative for difficulty urinating, dysuria, frequency and hematuria.   Musculoskeletal: Negative.  Negative for back pain and joint swelling.   Skin: Negative for rash.   Neurological: Negative for dizziness, speech difficulty and headaches.   Hematological: Negative for adenopathy.   Psychiatric/Behavioral: Negative.  Negative for confusion.   All other systems reviewed and are negative.      Physical Exam   Initial Vitals   BP Pulse Resp Temp SpO2   02/22/17 1349 02/22/17 1349 02/22/17 1350 02/22/17 1350 02/22/17 1349   149/80 90 18 98.1 °F (36.7 °C) 99 %     Physical Exam    Nursing note and vitals reviewed.  Constitutional: He appears well-developed and well-nourished.   BP slightly elevated, 150/84   HENT:   Head: Normocephalic and atraumatic.   Right Ear: External ear normal.   Left Ear: External ear normal.   Nose: Nose normal.   Mouth/Throat: Oropharynx is clear and moist.   Eyes: Conjunctivae and EOM are normal. Pupils are equal, round, and reactive to light.   Neck: Normal range of motion. Neck supple. No JVD present.   Cardiovascular: Normal rate, regular rhythm and normal heart sounds.   No murmur heard.  Pulmonary/Chest: Breath sounds normal. No respiratory distress. He has no wheezes. He has no rhonchi. He has no rales. He exhibits no tenderness.   Abdominal: Soft. Bowel sounds are normal. There is no tenderness. There is no rebound and no guarding.   Musculoskeletal: Normal range of motion. He exhibits no edema or tenderness.   Neurological: He is alert and oriented to person, place, and time. He has normal strength. No cranial nerve deficit.   Skin: Skin is warm and dry. No rash  "noted.   Psychiatric: He has a normal mood and affect.         ED Course   Procedures  Labs Reviewed   CBC W/ AUTO DIFFERENTIAL   COMPREHENSIVE METABOLIC PANEL   TROPONIN I   CK-MB   CK   APTT   PROTIME-INR     EKG Readings: (Independently Interpreted)   Initial Reading: No STEMI. Rhythm: Normal Sinus Rhythm. Heart Rate: 95. Ectopy: No Ectopy. Conduction: Normal. T Waves: Normal. Axis: Normal. Clinical Impression: Normal Sinus Rhythm Other Impression: nonspecific ST abnormality          Medical Decision Making:   Initial Assessment:   57 y/o male with history of DM, HTN, Ulcerative Colitis, presents with complaint of recurrent episodes of chest pain the past 2-3 days. The chest pain is left sided and feels like a "heaviness". It lasts less than 5 minutes.  He saw Dr. Pike today and related that it was exertion related and relieved by rest. He also states that the pain occurs at rest. The maximum intensity is 2/10. The current level is 0/10. He has several episodes per day. No known history of heart disease. He has never had a stress test. Dr. Pike referred the patient to the ER for further evaluation.    Normal exam; pain free at this time  Differential Diagnosis:   Angina/CAD/MI/ACS; GE pain; musculoskeletal pain  Clinical Tests:   Lab Tests: Ordered and Reviewed  The following lab test(s) were unremarkable: CBC, CMP and Troponin       <> Summary of Lab: Hgb 13  Glu 216  Troponin normal  CXR no acute findings  EKG no acute changes    Radiological Study: Ordered and Reviewed  Medical Tests: Ordered and Reviewed  ED Management:  Exam; labs; CXR; EKG  Aspirin not given due to reported allergy (patient states it is due to chronic GI problems)  Other:   I have discussed this case with another health care provider.       <> Summary of the Discussion: Case discussed with Dr. Whitaker who will place patient in OBS for further evaluation                   ED Course     Clinical Impression:   The primary " encounter diagnosis was Precordial pain. Diagnoses of Chest pain, Diabetes mellitus type 2, noninsulin dependent, and Essential hypertension were also pertinent to this visit.          Amol Samuels Jr., MD  02/22/17 1740

## 2017-02-23 VITALS
WEIGHT: 161 LBS | HEIGHT: 66 IN | TEMPERATURE: 97 F | HEART RATE: 65 BPM | RESPIRATION RATE: 20 BRPM | DIASTOLIC BLOOD PRESSURE: 74 MMHG | OXYGEN SATURATION: 100 % | SYSTOLIC BLOOD PRESSURE: 137 MMHG | BODY MASS INDEX: 25.88 KG/M2

## 2017-02-23 LAB
ALBUMIN SERPL BCP-MCNC: 3.9 G/DL
ALP SERPL-CCNC: 82 U/L
ALT SERPL W/O P-5'-P-CCNC: 16 U/L
ANION GAP SERPL CALC-SCNC: 11 MMOL/L
AST SERPL-CCNC: 13 U/L
BASOPHILS # BLD AUTO: 0.08 K/UL
BASOPHILS NFR BLD: 1 %
BILIRUB SERPL-MCNC: 0.7 MG/DL
BUN SERPL-MCNC: 11 MG/DL
CALCIUM SERPL-MCNC: 9.7 MG/DL
CHLORIDE SERPL-SCNC: 100 MMOL/L
CO2 SERPL-SCNC: 24 MMOL/L
CREAT SERPL-MCNC: 1 MG/DL
DIFFERENTIAL METHOD: ABNORMAL
EOSINOPHIL # BLD AUTO: 0.3 K/UL
EOSINOPHIL NFR BLD: 3.5 %
ERYTHROCYTE [DISTWIDTH] IN BLOOD BY AUTOMATED COUNT: 13 %
EST. GFR  (AFRICAN AMERICAN): >60 ML/MIN/1.73 M^2
EST. GFR  (NON AFRICAN AMERICAN): >60 ML/MIN/1.73 M^2
ESTIMATED AVG GLUCOSE: 226 MG/DL
FERRITIN SERPL-MCNC: 27 NG/ML
FOLATE SERPL-MCNC: 15.9 NG/ML
GLUCOSE SERPL-MCNC: 215 MG/DL
HBA1C MFR BLD HPLC: 9.5 %
HCT VFR BLD AUTO: 39.9 %
HGB BLD-MCNC: 13.1 G/DL
IRON SERPL-MCNC: 80 UG/DL
LYMPHOCYTES # BLD AUTO: 2.9 K/UL
LYMPHOCYTES NFR BLD: 36.5 %
MCH RBC QN AUTO: 26.4 PG
MCHC RBC AUTO-ENTMCNC: 32.8 %
MCV RBC AUTO: 80 FL
MONOCYTES # BLD AUTO: 0.6 K/UL
MONOCYTES NFR BLD: 6.9 %
NEUTROPHILS # BLD AUTO: 4.1 K/UL
NEUTROPHILS NFR BLD: 52 %
PLATELET # BLD AUTO: 291 K/UL
PMV BLD AUTO: 10.1 FL
POCT GLUCOSE: 185 MG/DL (ref 70–110)
POCT GLUCOSE: 244 MG/DL (ref 70–110)
POTASSIUM SERPL-SCNC: 4.5 MMOL/L
PROT SERPL-MCNC: 7.9 G/DL
RBC # BLD AUTO: 4.97 M/UL
RETIRED EF AND QEF - SEE NOTES: 40 (ref 55–65)
SATURATED IRON: 16 %
SODIUM SERPL-SCNC: 135 MMOL/L
TOTAL IRON BINDING CAPACITY: 491 UG/DL
TRANSFERRIN SERPL-MCNC: 332 MG/DL
VIT B12 SERPL-MCNC: 893 PG/ML
WBC # BLD AUTO: 7.95 K/UL

## 2017-02-23 PROCEDURE — 82728 ASSAY OF FERRITIN: CPT

## 2017-02-23 PROCEDURE — 93351 STRESS TTE COMPLETE: CPT

## 2017-02-23 PROCEDURE — 80053 COMPREHEN METABOLIC PANEL: CPT

## 2017-02-23 PROCEDURE — 82607 VITAMIN B-12: CPT

## 2017-02-23 PROCEDURE — 93005 ELECTROCARDIOGRAM TRACING: CPT

## 2017-02-23 PROCEDURE — 83540 ASSAY OF IRON: CPT

## 2017-02-23 PROCEDURE — 93010 ELECTROCARDIOGRAM REPORT: CPT | Mod: ,,, | Performed by: INTERNAL MEDICINE

## 2017-02-23 PROCEDURE — 85025 COMPLETE CBC W/AUTO DIFF WBC: CPT

## 2017-02-23 PROCEDURE — 94761 N-INVAS EAR/PLS OXIMETRY MLT: CPT

## 2017-02-23 PROCEDURE — 36415 COLL VENOUS BLD VENIPUNCTURE: CPT

## 2017-02-23 PROCEDURE — 63600175 PHARM REV CODE 636 W HCPCS: Performed by: STUDENT IN AN ORGANIZED HEALTH CARE EDUCATION/TRAINING PROGRAM

## 2017-02-23 PROCEDURE — 83036 HEMOGLOBIN GLYCOSYLATED A1C: CPT

## 2017-02-23 PROCEDURE — 82746 ASSAY OF FOLIC ACID SERUM: CPT

## 2017-02-23 PROCEDURE — 25000003 PHARM REV CODE 250: Performed by: STUDENT IN AN ORGANIZED HEALTH CARE EDUCATION/TRAINING PROGRAM

## 2017-02-23 PROCEDURE — 11000001 HC ACUTE MED/SURG PRIVATE ROOM

## 2017-02-23 RX ORDER — ATORVASTATIN CALCIUM 20 MG/1
20 TABLET, FILM COATED ORAL NIGHTLY
Qty: 90 TABLET | Refills: 3 | Status: ON HOLD | OUTPATIENT
Start: 2017-02-23 | End: 2017-03-09 | Stop reason: HOSPADM

## 2017-02-23 RX ORDER — CLOPIDOGREL BISULFATE 75 MG/1
75 TABLET ORAL DAILY
Qty: 30 TABLET | Refills: 11 | Status: SHIPPED | OUTPATIENT
Start: 2017-02-23 | End: 2018-02-19 | Stop reason: SDUPTHER

## 2017-02-23 RX ORDER — NITROGLYCERIN 0.4 MG/1
0.4 TABLET SUBLINGUAL EVERY 5 MIN PRN
Qty: 30 TABLET | Refills: 1 | Status: SHIPPED | OUTPATIENT
Start: 2017-02-23 | End: 2020-09-01 | Stop reason: SDUPTHER

## 2017-02-23 RX ADMIN — LISINOPRIL 10 MG: 5 TABLET ORAL at 08:02

## 2017-02-23 RX ADMIN — PANTOPRAZOLE SODIUM 40 MG: 40 TABLET, DELAYED RELEASE ORAL at 08:02

## 2017-02-23 RX ADMIN — INSULIN ASPART 4 UNITS: 100 INJECTION, SOLUTION INTRAVENOUS; SUBCUTANEOUS at 12:02

## 2017-02-23 RX ADMIN — CLOPIDOGREL BISULFATE 75 MG: 75 TABLET ORAL at 08:02

## 2017-02-23 NOTE — PLAN OF CARE
02/23/17 1235   Final Note   Assessment Type Final Discharge Note   Discharge Disposition Home   Discharge planning education complete? Yes   What phone number can be called within the next 1-3 days to see how you are doing after discharge? [phone]   Hospital Follow Up  Appt(s) scheduled? No  (pt to call to schedule f/u with Cards next week; TN had to leave message for f/u with PCP)   Offered [facility] -- Bedside Delivery? Yes   Discharge/Hospital Encounter Summary to (non-[facility]) PCP n/a   Referral to Outpatient Case Management complete? n/a   Referral to / orders for Home Health Complete? n/a   30 day supply of medicines given at discharge, if documented non-compliance / non-adherence? n/a   Any social issues identified prior to discharge? No   Did you assess the readiness or willingness of the family or caregiver to support self management of care? Yes   Right Care Referral Info   Post Acute Recommendation No Care

## 2017-02-23 NOTE — PROGRESS NOTES
"[facility] Hospitalist Resident HO-2 Progress Note    Subjective:      No episodes of chest pain overnight. Feels great. Denies nausea or vomiting.      Objective:   Last 24 Hour Vital Signs:  BP  Min: 111/59  Max: 153/81  Temp  Av °F (36.7 °C)  Min: 97.5 °F (36.4 °C)  Max: 98.2 °F (36.8 °C)  Pulse  Av.4  Min: 60  Max: 92  Resp  Av.7  Min: 14  Max: 20  SpO2  Av.8 %  Min: 97 %  Max: 100 %  Height  Av' 6" (167.6 cm)  Min: 5' 6" (167.6 cm)  Max: 5' 6" (167.6 cm)  Weight  Av.2 kg (161 lb 4.8 oz)  Min: 73 kg (161 lb)  Max: 73.3 kg (161 lb 9.6 oz)       Physical Examination:  Gen: pleasant, sitting up in bed, NAD  HEENT: NCAT, MMM, EOMI, PERRL, JVP 5 cm  CV: RRR, no murmurs  Resp: CTA B, no increased wob, no crackles  Abd: soft, NT/ND  Ext: compartments soft, NT, no swelling, 2+ dp pulses  Skin: no rashes    Laboratory:  Laboratory Data Reviewed: yes  Pertinent Findings:  Troponin 0.006 x 2    Other Results:  EKG (my interpretation): normal sinus rhythm, tw flattening in v3 and III (chronic)    Radiology Data Reviewed: yes  Pertinent Findings:  cxr without opacities    Current Medications:     Infusions:        Scheduled:   atorvastatin  20 mg Oral QHS    clopidogrel  75 mg Oral Daily    lisinopril  10 mg Oral Daily    pantoprazole  40 mg Oral Daily        PRN:  dextrose 50%, dextrose 50%, glucagon (human recombinant), glucose, glucose, insulin aspart, nitroGLYCERIN    Antibiotics and Day Number of Therapy:  none    Lines and Day Number of Therapy:  piv    Assessment:     Michoacano Lind is a 56 y.o.male with unstable angina   Plan:   Unstable Angina  - Hx of chest pain for 3 days intermittent, occurs at rest and exertion. Describes as pressure and heaviness to left side of chest. Has strong family history of cardiac disease.  - Troponin negative x 2 and EKG with flattened twave in v3, no st elevation/dep.   - Unable to take ASA 2/2 gastrointestinal intolerance. Placed on Plavix 75 mg daily per " ACC/AHA guidelines and give one time dose of Lovenox 1 mg/kg. Held BB in AM for stress test. Cont ACEi and added statin.   - Exercise stress test, follow up results. Cardiology consulted for unstable angina given hx and family hx. Appreciate assistance.     HTN  - Continue Lisinopril 10 mg daily and Toprol-XL 25 mg daily following stress test  - Add/titrate medications as needed     Diabetes Mellitus Type 2  - Last A1C 12, repeat ordered. Holding home Glipizide and Metformin. Not on insulin  - SSI and POCT glucose checks     Ulcerative Colitis  - No acute issues. Follows with Dr. Lynn  - Already took Mesalamine this morning, continue. Continue Omeprazole 40 mg daily     Microcytic Anemia  - Hgb 13, baseline 12-13  - Iron studies ordered, f/u results    Dispo: pending stress test results    Tierney Figueroa  [facility] Internal Medicine HO-2  [facility] Hospitalist Service Team B    [facility] Medicine Hospitalist Pager numbers:   [facility] Hospitalist Medicine Team A (Blas/Ewa): [phone]  [facility] Hospitalist Medicine Team B (Kiran/Sherman):  [phone]

## 2017-02-23 NOTE — NURSING
Reviewed discharge education with the pt. Pt was able to verbalize an understanding of the the education provided. Peripheral iv was removed with the tip intact. Insertion site covered with gauze and coban.  Tele was removed from the pt, cleaned and returned to nurses station. Pt waiting on transportation to main [facility].

## 2017-02-23 NOTE — ED NOTES
Pt stable resting quietly in bed with NAD noted. No c/o chest pain or discomfort at this time. No n/v. VSS. Bed locked in lowest position with call light in reach.Will cont to monitor pt

## 2017-02-23 NOTE — PLAN OF CARE
Pt voices no discharge needs. TN left message at PCP office  for follow up within 1 week.     02/23/17 1012   Discharge Assessment   Assessment Type Discharge Planning Assessment   Confirmed/corrected address and phone number on facesheet? Yes   Assessment information obtained from? Patient   Expected Length of Stay (days) 1   Communicated expected length of stay with patient/caregiver yes   Prior to hospitilization cognitive status: Alert/Oriented   Prior to hospitalization functional status: Independent   Current cognitive status: Alert/Oriented   Current Functional Status: Independent   Arrived From home or self-care   Lives With spouse;child(chin), dependent   Able to Return to Prior Arrangements yes   Is patient able to care for self after discharge? Yes   How many people do you have in your home that can help with your care after discharge? 1   Who are your caregiver(s) and their phone number(s)? Perla(spouse) [phone]   Patient's perception of discharge disposition home or selfcare   Readmission Within The Last 30 Days no previous admission in last 30 days   Patient currently being followed by outpatient case management? No   Patient currently receives home health services? No   Does the patient currently use HME? No   Patient currently receives private duty nursing? No   Patient currently receives any other outside agency services? No   Equipment Currently Used at Home none   Do you have any problems affording any of your prescribed medications? No   Is the patient taking medications as prescribed? yes   Do you have any financial concerns preventing you from receiving the healthcare you need? No   Does the patient have transportation to healthcare appointments? Yes   Transportation Available family or friend will provide;car   On Dialysis? No   Does the patient receive services at the Coumadin Clinic? No   Are there any open cases? No   Discharge Plan A Home   Discharge Plan B Home with family    Patient/Family In Agreement With Plan yes

## 2017-02-23 NOTE — PROGRESS NOTES
[facility]  Cardiology  Consult Note    Patient Name: Michoacano Lind  MRN: [id number]  Admission Date: 2/22/2017  Hospital Length of Stay: 0 days  Code Status: Full Code   Attending Provider: Willis Whitaker MD   Consulting Provider: Lor Morris MD  Primary Care Physician: Lee Rapp MD  Principal Problem:Unstable angina    Patient information was obtained from patient and past medical records.     Consults  Subjective:     Chief Complaint:  Chest pain     HPI: This is a 56 Y O M patient with past medical history of hypertension and diabetes mellitus presents to the hospital with the complaint of 3 day history of chest discomfort. Dull and aching type of discomfort, located in the middle of the chest, intermittent, no aggravating or alleviating factors. No  Associated shortness of breath, pedal edema, palpitations or PND. Patient's symptoms wouldn't go away prompting the patient to come to the ED. He noticed some cough and chest congestion few days ago but did not find any temporal relation to his current chest discomfort. No  Runny nose, sore throat, myalgias or fever.   Patient had one episode of chest pain in the past, he had an EKG at that time which did not show any ischemic changes. He was started on aspirin for a brief period of time then but he started having bleeding issues with his ulcerative colitis and his aspirin was stopped then. Did not take any aspirin since then.    Past Medical History   Diagnosis Date    Diabetes mellitus, type II     HTN (hypertension)     Ulcerative colitis        Past Surgical History   Procedure Laterality Date    Cataract extraction Bilateral 2013    Colonoscopy N/A 1/22/2016     Procedure: COLONOSCOPY;  Surgeon: Aristeo Lynn Jr., MD;  Location: [facility];  Service: Endoscopy;  Laterality: N/A;       Review of patient's allergies indicates:   Allergen Reactions    Aspirin        No current facility-administered medications on file  prior to encounter.      Current Outpatient Prescriptions on File Prior to Encounter   Medication Sig    APRISO 0.375 gram Cp24 TAKE FOUR CAPSULES BY MOUTH ONCE DAILY    blood sugar diagnostic (BLOOD GLUCOSE TEST) Strp Dispense 1 meter and 100 test strips (insurance covered brand). Test sugar daily    glipiZIDE (GLUCOTROL) 10 MG tablet TAKE ONE TABLET BY MOUTH TWICE DAILY WITH MEALS    lisinopril 10 MG tablet Take 1 tablet (10 mg total) by mouth once daily.    metformin (GLUCOPHAGE) 1000 MG tablet Take 1 tablet (1,000 mg total) by mouth 2 (two) times daily with meals.    metoprolol succinate (TOPROL-XL) 25 MG 24 hr tablet Take 1 tablet (25 mg total) by mouth once daily.    pantoprazole (PROTONIX) 40 MG tablet     omeprazole (PRILOSEC) 40 MG capsule Take 1 capsule (40 mg total) by mouth once daily.     Family History     Problem Relation (Age of Onset)    Coronary artery disease Mother (75), Father (55), Brother (59)    Diabetes Brother        Social History Main Topics    Smoking status: Former Smoker    Smokeless tobacco: Not on file    Alcohol use 0.0 oz/week     0 Standard drinks or equivalent per week      Comment: occasionally    Drug use: No    Sexual activity: Yes     Review of Systems   Constitution: Negative for chills, decreased appetite, fever, weakness, malaise/fatigue and night sweats.   HENT: Negative for congestion, ear discharge, ear pain and headaches.    Eyes: Negative for blurred vision and discharge.   Cardiovascular: Positive for chest pain and cyanosis. Negative for claudication, dyspnea on exertion, paroxysmal nocturnal dyspnea and syncope.   Respiratory: Positive for cough. Negative for hemoptysis, shortness of breath, sleep disturbances due to breathing, snoring, sputum production and wheezing.    Gastrointestinal: Negative for constipation, diarrhea, nausea and vomiting.     Objective:     Vital Signs (Most Recent):  Temp: 96.9 °F (36.1 °C) (02/23/17 0841)  Pulse: 73 (02/23/17  0841)  Resp: 20 (02/23/17 0841)  BP: 116/71 (02/23/17 0841)  SpO2: 98 % (02/23/17 0321) Vital Signs (24h Range):  Temp:  [96.9 °F (36.1 °C)-98.2 °F (36.8 °C)] 96.9 °F (36.1 °C)  Pulse:  [60-92] 73  Resp:  [14-20] 20  SpO2:  [97 %-100 %] 98 %  BP: (111-153)/(59-84) 116/71     Physical Exam   Constitutional: He is oriented to person, place, and time. He appears well-developed and well-nourished. No distress.   HENT:   Head: Normocephalic and atraumatic.   Eyes: Conjunctivae and EOM are normal. Pupils are equal, round, and reactive to light.   Neck: Normal range of motion. Neck supple.   Cardiovascular: Normal rate, regular rhythm, normal heart sounds and intact distal pulses.    Pulmonary/Chest: Effort normal and breath sounds normal. He has no wheezes. He has no rales.   Abdominal: Soft. Bowel sounds are normal.   Neurological: He is alert and oriented to person, place, and time. He has normal reflexes.       Significant Labs:   Troponin   Recent Labs  Lab 02/22/17  1450 02/22/17  2154   TROPONINI <0.006 <0.006       Significant Imaging: Stress Test: Stress echocardiogram shows decreased baseline ejection fraction around 40 to 45% with lateral wall hypokinesis. On stress there is decreased hypercontractile response, with EKG portion showing new T wave inversions during recovery.  Assessment and Plan:     This is a 56 Y o M patient with     1. Chest pain  2. Positive stress test  3. Diabetes mellitus  4. Hypertension    -Patient with positive stress test as noted above  -Will discuss with the patient Left heart cath vs. Medical management  -Need to start the patient on aspirin, can discontinue if he has GI bleeding from his ulcerative colitis again.    Lor Morris MD  Cardiology   [facility]

## 2017-02-23 NOTE — PLAN OF CARE
Problem: Patient Care Overview  Goal: Plan of Care Review  Outcome: Ongoing (interventions implemented as appropriate)  Patient educated on medications including side effects and plan of care. Understanding verbalized.  Telemetry monitor on and audible showing no red alarms and normal sinus rhythm. Bed locked and in lowest position. All safety measures maintained with bed alarm on and working. Patient understands the use of the call light placed near him. Wife at bedside throughout shift

## 2017-02-24 ENCOUNTER — TELEPHONE (OUTPATIENT)
Dept: FAMILY MEDICINE | Facility: CLINIC | Age: 57
End: 2017-02-24

## 2017-02-24 NOTE — DISCHARGE SUMMARY
[facility] Discharge Summary    Primary Team: [facility]  Attending Physician: Dr. Whitaker  Resident: Dr. Figueroa  Intern: Dr. Martinez    Date of Admit: 2017  Date of Discharge: 2017    Discharge to: Home   Condition: Stable    Discharge Diagnoses     Patient Active Problem List   Diagnosis    Colitis    Idiopathic ulcerative colitis    Screening for colorectal cancer    Unstable angina    Uncontrolled type 2 diabetes mellitus without complication, without long-term current use of insulin    Essential hypertension    Precordial pain       Consultants and Procedures     Consultants:  Cardiology    Procedures:   Stress Test  ECHO    Brief History of Present Illness      Michoacano Lind is a 56 y.o. male with uncontrolled diabetes mellitus - type 2 and HTN was in his USOH (performs all adls, very active) until three days ago he noticed he would have left sided chest pain that he would describe as a heaviness and pressure pain. Pain would not radiate to jaw or left arm. Pain occurred with exertion and while at rest. Pain would not last long (he was unable to give exact time). Pain was not reproducible or did not worsen with movement or inspiration. Pain was not associated with shortness of breath, nausea or vomiting. He denies recent airplane trips. He denies swelling or pain to calves. He has never had this pain before so he saw a primary care doctor who sent him to the ED. His father had a history of a MI at age 55 which he  from. His brother has CAD at age of 59. He is a former smoker (quit many years ago). At time of presentation, patient did not have any chest pain.     For the full HPI please refer to the History & Physical from this admission.    Hospital Course By Problem with Pertinent Findings     Unstable Angina  - Hx of chest pain for 3 days intermittent, occurs at rest and exertion. Describes as pressure and heaviness to left side of chest. Has strong family  history of cardiac disease.  - Troponin negative x 2 and EKG with flattened twave in v3, no st elevation/dep.   - Unable to take ASA 2/2 gastrointestinal intolerance. Placed on Plavix 75 mg daily per ACC/AHA guidelines and give one time dose of Lovenox 1 mg/kg. Held BB in AM for stress test. Cont ACEi and added statin.   - Exercise stress test, showing ischemic changes. Cardiology consulted recommending angiogram. However, patient states he needs to go home for work and wishes to have angio done outpatient. Scheduled for early next week. Discharge patient to home with Plavix, BB, ACEi, NTG SL and close follow up.       HTN  - Continue Lisinopril 10 mg daily and Toprol-XL 25 mg daily following stress test  - Add/titrate medications as needed      Diabetes Mellitus Type 2  - Last A1C 12, repeat 9, improving. Holding home Glipizide and Metformin. Not on insulin  - SSI and POCT glucose checks      Ulcerative Colitis  - No acute issues. Follows with Dr. Lynn  - Already took Mesalamine this morning, continue. Continue Omeprazole 40 mg daily      Microcytic Anemia  - Hgb 13, baseline 12-13  - Iron studies ordered, ferritin borderline low, likely 2/2 UC, will need Fe supplementation and follow up with PCP, UTD colonoscopy        Discharge Medications        Medication List      START taking these medications          atorvastatin 20 MG tablet   Commonly known as:  LIPITOR   Take 1 tablet (20 mg total) by mouth every evening.       clopidogrel 75 mg tablet   Commonly known as:  PLAVIX   Take 1 tablet (75 mg total) by mouth once daily.       nitroGLYCERIN 0.4 MG SL tablet   Commonly known as:  NITROSTAT   Place 1 tablet (0.4 mg total) under the tongue every 5 (five) minutes as needed for Chest pain.         CONTINUE taking these medications          APRISO 0.375 gram Cp24   Generic drug:  mesalamine   TAKE FOUR CAPSULES BY MOUTH ONCE DAILY       blood sugar diagnostic Strp   Commonly known as:  BLOOD GLUCOSE TEST    Dispense 1 meter and 100 test strips (insurance covered brand). Test sugar daily       glipiZIDE 10 MG tablet   Commonly known as:  GLUCOTROL   TAKE ONE TABLET BY MOUTH TWICE DAILY WITH MEALS       lisinopril 10 MG tablet   Take 1 tablet (10 mg total) by mouth once daily.       metformin 1000 MG tablet   Commonly known as:  GLUCOPHAGE   Take 1 tablet (1,000 mg total) by mouth 2 (two) times daily with meals.       metoprolol succinate 25 MG 24 hr tablet   Commonly known as:  TOPROL-XL   Take 1 tablet (25 mg total) by mouth once daily.       pantoprazole 40 MG tablet   Commonly known as:  PROTONIX         STOP taking these medications          omeprazole 40 MG capsule   Commonly known as:  PRILOSEC            Where to Get Your Medications      You can get these medications from any pharmacy     Bring a paper prescription for each of these medications     atorvastatin 20 MG tablet    clopidogrel 75 mg tablet    nitroGLYCERIN 0.4 MG SL tablet             Discharge Information:   Diet:  Cardiac and Diabetic    Physical Activity:  As Tolerated    Instructions:  1. Take all medications as prescribed  2. Keep all follow-up appointments  3. Return to the hospital or call your primary care physicians if any worsening symptoms such as chest pain, SOB or palpitations occur.      Follow-Up Appointments:  Future Appointments  Date Time Provider Department Center   3/6/2017 9:00 AM Chiquita Reyes MD [facility] NOHEMI Martinez  [facility] Internal Medicine, -

## 2017-02-24 NOTE — TELEPHONE ENCOUNTER
----- Message from Stephon Rangel LPN sent at 2/24/2017  9:14 AM CST -----  Contact: Sherri, [facility]       ----- Message -----     From: Patsy Walker     Sent: 2/23/2017  10:07 AM       To: Vanesa Parson Staff    Called in requesting to schedule a hospital discharge appointment within 1 week. Requests a call back to patient to [phone]. Please advise.

## 2017-03-02 NOTE — PRE ADMISSION SCREENING
Called and spoke with pt, arrival time 8am. Instructed to hold metformin for 2 days pre-procedure with last dose being Monday the 6th. Verbalizes understanding to all other pre-op instructions and denies questions.

## 2017-03-09 ENCOUNTER — HOSPITAL ENCOUNTER (OUTPATIENT)
Facility: HOSPITAL | Age: 57
Discharge: HOME | End: 2017-03-09
Attending: INTERNAL MEDICINE | Admitting: INTERNAL MEDICINE
Payer: MEDICAID

## 2017-03-09 ENCOUNTER — SURGERY (OUTPATIENT)
Age: 57
End: 2017-03-09

## 2017-03-09 VITALS
HEIGHT: 66 IN | BODY MASS INDEX: 25.23 KG/M2 | WEIGHT: 157 LBS | RESPIRATION RATE: 18 BRPM | TEMPERATURE: 98 F | HEART RATE: 65 BPM | DIASTOLIC BLOOD PRESSURE: 80 MMHG | OXYGEN SATURATION: 100 % | SYSTOLIC BLOOD PRESSURE: 120 MMHG

## 2017-03-09 DIAGNOSIS — R07.2 PRECORDIAL PAIN: ICD-10-CM

## 2017-03-09 DIAGNOSIS — I25.10 ATHEROSCLEROTIC HEART DISEASE OF NATIVE CORONARY ARTERY WITHOUT ANGINA PECTORIS: ICD-10-CM

## 2017-03-09 DIAGNOSIS — K52.9 NONINFECTIVE GASTROENTERITIS AND COLITIS: ICD-10-CM

## 2017-03-09 DIAGNOSIS — I20.0 UNSTABLE ANGINA: ICD-10-CM

## 2017-03-09 DIAGNOSIS — R94.31 ABNORMAL ELECTROCARDIOGRAPHY DURING EXERCISE STRESS TEST: Primary | ICD-10-CM

## 2017-03-09 LAB
ANION GAP SERPL CALC-SCNC: 11 MMOL/L
APTT BLDCRRT: 29.6 SEC
BASOPHILS # BLD AUTO: 0.04 K/UL
BASOPHILS NFR BLD: 0.4 %
BUN SERPL-MCNC: 8 MG/DL
CALCIUM SERPL-MCNC: 9.6 MG/DL
CHLORIDE SERPL-SCNC: 100 MMOL/L
CO2 SERPL-SCNC: 26 MMOL/L
CREAT SERPL-MCNC: 1 MG/DL
DIFFERENTIAL METHOD: ABNORMAL
EOSINOPHIL # BLD AUTO: 0.3 K/UL
EOSINOPHIL NFR BLD: 2.6 %
ERYTHROCYTE [DISTWIDTH] IN BLOOD BY AUTOMATED COUNT: 13 %
EST. GFR  (AFRICAN AMERICAN): >60 ML/MIN/1.73 M^2
EST. GFR  (NON AFRICAN AMERICAN): >60 ML/MIN/1.73 M^2
GLUCOSE SERPL-MCNC: 192 MG/DL
HCT VFR BLD AUTO: 37 %
HGB BLD-MCNC: 12.2 G/DL
INR PPP: 1.1
LYMPHOCYTES # BLD AUTO: 2 K/UL
LYMPHOCYTES NFR BLD: 20 %
MCH RBC QN AUTO: 26.6 PG
MCHC RBC AUTO-ENTMCNC: 33 %
MCV RBC AUTO: 81 FL
MONOCYTES # BLD AUTO: 0.6 K/UL
MONOCYTES NFR BLD: 5.9 %
NEUTROPHILS # BLD AUTO: 7.1 K/UL
NEUTROPHILS NFR BLD: 70.9 %
PLATELET # BLD AUTO: 263 K/UL
PMV BLD AUTO: 10.5 FL
POTASSIUM SERPL-SCNC: 4.1 MMOL/L
PROTHROMBIN TIME: 11.2 SEC
RBC # BLD AUTO: 4.58 M/UL
SODIUM SERPL-SCNC: 137 MMOL/L
WBC # BLD AUTO: 10.03 K/UL

## 2017-03-09 PROCEDURE — 99152 MOD SED SAME PHYS/QHP 5/>YRS: CPT

## 2017-03-09 PROCEDURE — 25500020 PHARM REV CODE 255

## 2017-03-09 PROCEDURE — 93010 ELECTROCARDIOGRAM REPORT: CPT | Mod: ,,, | Performed by: INTERNAL MEDICINE

## 2017-03-09 PROCEDURE — 85730 THROMBOPLASTIN TIME PARTIAL: CPT

## 2017-03-09 PROCEDURE — 85025 COMPLETE CBC W/AUTO DIFF WBC: CPT

## 2017-03-09 PROCEDURE — 63600175 PHARM REV CODE 636 W HCPCS

## 2017-03-09 PROCEDURE — 80048 BASIC METABOLIC PNL TOTAL CA: CPT

## 2017-03-09 PROCEDURE — 85610 PROTHROMBIN TIME: CPT

## 2017-03-09 PROCEDURE — 93005 ELECTROCARDIOGRAM TRACING: CPT

## 2017-03-09 PROCEDURE — 25000003 PHARM REV CODE 250

## 2017-03-09 PROCEDURE — 25000003 PHARM REV CODE 250: Performed by: INTERNAL MEDICINE

## 2017-03-09 RX ORDER — ISOSORBIDE MONONITRATE 30 MG/1
30 TABLET, EXTENDED RELEASE ORAL DAILY
Qty: 30 TABLET | Refills: 11 | Status: SHIPPED | OUTPATIENT
Start: 2017-03-09 | End: 2017-09-13

## 2017-03-09 RX ORDER — ACETAMINOPHEN 325 MG/1
650 TABLET ORAL EVERY 4 HOURS PRN
Status: DISCONTINUED | OUTPATIENT
Start: 2017-03-09 | End: 2017-03-09 | Stop reason: HOSPADM

## 2017-03-09 RX ORDER — VALSARTAN 80 MG/1
80 TABLET ORAL DAILY
Qty: 90 TABLET | Refills: 3 | Status: SHIPPED | OUTPATIENT
Start: 2017-03-09 | End: 2017-03-09

## 2017-03-09 RX ORDER — SODIUM CHLORIDE 9 MG/ML
INJECTION, SOLUTION INTRAVENOUS ONCE
Status: DISCONTINUED | OUTPATIENT
Start: 2017-03-09 | End: 2017-03-09 | Stop reason: HOSPADM

## 2017-03-09 RX ORDER — VALSARTAN 80 MG/1
80 TABLET ORAL DAILY
Qty: 90 TABLET | Refills: 3 | Status: SHIPPED | OUTPATIENT
Start: 2017-03-09 | End: 2017-03-16 | Stop reason: SDUPTHER

## 2017-03-09 RX ORDER — ATORVASTATIN CALCIUM 80 MG/1
80 TABLET, FILM COATED ORAL DAILY
Qty: 90 TABLET | Refills: 3 | Status: SHIPPED | OUTPATIENT
Start: 2017-03-09 | End: 2018-10-30 | Stop reason: SDUPTHER

## 2017-03-09 RX ORDER — HYDROCODONE BITARTRATE AND ACETAMINOPHEN 5; 325 MG/1; MG/1
1 TABLET ORAL EVERY 4 HOURS PRN
Status: DISCONTINUED | OUTPATIENT
Start: 2017-03-09 | End: 2017-03-09 | Stop reason: HOSPADM

## 2017-03-09 RX ORDER — SODIUM CHLORIDE 9 MG/ML
1.5 INJECTION, SOLUTION INTRAVENOUS CONTINUOUS
Status: DISCONTINUED | OUTPATIENT
Start: 2017-03-09 | End: 2017-03-09 | Stop reason: HOSPADM

## 2017-03-09 RX ADMIN — SODIUM CHLORIDE 1.5 ML/KG/HR: 0.9 INJECTION, SOLUTION INTRAVENOUS at 11:03

## 2017-03-09 NOTE — PROGRESS NOTES
Vascband removed from Right wrist.  No hematoma noted.  [place] and tegaderm placed.  Area is within normal limits.  Recovery complete at 1445.  Will continue to monitor closely.

## 2017-03-09 NOTE — DISCHARGE SUMMARY
Patient is a 55 y/o M with pmhx of T2DM, HTN, HLD who had been having anginal sx at least since 02/2017. He had ECG abnormalities and + ambulatory stress ECHO. He presented as elective one day stay for LHC/Cors/LV gram via RRA under moderate sedation and was found to have 3VD as described in brief op/op notes.     He is being discharged today in stable condition and will have an outpatient appointment set up with CT surgery for CABG eval.    He is to hold his metformin today and resume tomorrow. He can continue his plavix until seen by CT surgery as d/w Dr. Espitia. Also increased Atorvastatin to 80mg daily, started imdur 30mg dialy and changed lisinopril to valsartan 80mg daily 2/2 ACEI cough.

## 2017-03-09 NOTE — IP AVS SNAPSHOT
[facility]  [address]  [place] [address]  Phone: [phone]           Patient Discharge Instructions     Our goal is to set you up for success. This packet includes information on your condition, medications, and your home care. It will help you to care for yourself so you don't get sicker and need to go back to the hospital.     Please ask your nurse if you have any questions.        There are many details to remember when preparing to leave the hospital. Here is what you will need to do:    1. Take your medicine. If you are prescribed medications, review your Medication List in the following pages. You may have new medications to  at the pharmacy and others that you'll need to stop taking. Review the instructions for how and when to take your medications. Talk with your doctor or nurses if you are unsure of what to do.     2. Go to your follow-up appointments. Specific follow-up information is listed in the following pages. Your may be contacted by a transition nurse or clinical provider about future appointments. Be sure we have all of the phone numbers to reach you, if needed. Please contact your provider's office if you are unable to make an appointment.     3. Watch for warning signs. Your doctor or nurse will give you detailed warning signs to watch for and when to call for assistance. These instructions may also include educational information about your condition. If you experience any of warning signs to your health, call your doctor.               ** Verify the list of medication(s) below is accurate and up to date. Carry this with you in case of emergency. If your medications have changed, please notify your healthcare provider.             Medication List      START taking these medications        Additional Info                      isosorbide mononitrate 30 MG 24 hr tablet   Commonly known as:  IMDUR   Quantity:  30 tablet   Refills:  11   Dose:  30 mg     Instructions:  Take 1 tablet (30 mg total) by mouth once daily.     Begin Date    AM    Noon    PM    Bedtime       valsartan 80 MG tablet   Commonly known as:  DIOVAN   Quantity:  90 tablet   Refills:  3   Dose:  80 mg    Instructions:  Take 1 tablet (80 mg total) by mouth once daily.     Begin Date    AM    Noon    PM    Bedtime         CHANGE how you take these medications        Additional Info                      atorvastatin 80 MG tablet   Commonly known as:  LIPITOR   Quantity:  90 tablet   Refills:  3   Dose:  80 mg   What changed:    - medication strength  - how much to take  - when to take this    Instructions:  Take 1 tablet (80 mg total) by mouth once daily.     Begin Date    AM    Noon    PM    Bedtime         CONTINUE taking these medications        Additional Info                      APRISO 0.375 gram Cp24   Quantity:  120 capsule   Refills:  6   Generic drug:  mesalamine    Instructions:  TAKE FOUR CAPSULES BY MOUTH ONCE DAILY     Begin Date    AM    Noon    PM    Bedtime       blood sugar diagnostic Strp   Commonly known as:  BLOOD GLUCOSE TEST   Quantity:  100 strip   Refills:  11    Instructions:  Dispense 1 meter and 100 test strips (insurance covered brand). Test sugar daily     Begin Date    AM    Noon    PM    Bedtime       clopidogrel 75 mg tablet   Commonly known as:  PLAVIX   Quantity:  30 tablet   Refills:  11   Dose:  75 mg    Instructions:  Take 1 tablet (75 mg total) by mouth once daily.     Begin Date    AM    Noon    PM    Bedtime       glipiZIDE 10 MG tablet   Commonly known as:  GLUCOTROL   Quantity:  60 tablet   Refills:  11    Instructions:  TAKE ONE TABLET BY MOUTH TWICE DAILY WITH MEALS     Begin Date    AM    Noon    PM    Bedtime       metoprolol succinate 25 MG 24 hr tablet   Commonly known as:  TOPROL-XL   Quantity:  30 tablet   Refills:  11   Dose:  25 mg    Instructions:  Take 1 tablet (25 mg total) by mouth once daily.     Begin Date    AM    Noon    PM    Bedtime        nitroGLYCERIN 0.4 MG SL tablet   Commonly known as:  NITROSTAT   Quantity:  30 tablet   Refills:  1   Dose:  0.4 mg    Instructions:  Place 1 tablet (0.4 mg total) under the tongue every 5 (five) minutes as needed for Chest pain.     Begin Date    AM    Noon    PM    Bedtime       pantoprazole 40 MG tablet   Commonly known as:  PROTONIX   Refills:  0      Begin Date    AM    Noon    PM    Bedtime         STOP taking these medications     lisinopril 10 MG tablet       metformin 1000 MG tablet   Commonly known as:  GLUCOPHAGE            Where to Get Your Medications      You can get these medications from any pharmacy     Bring a paper prescription for each of these medications     atorvastatin 80 MG tablet    isosorbide mononitrate 30 MG 24 hr tablet    valsartan 80 MG tablet                  Please bring to all follow up appointments:    1. A copy of your discharge instructions.  2. All medicines you are currently taking in their original bottles.  3. Identification and insurance card.    Please arrive 15 minutes ahead of scheduled appointment time.    Please call 24 hours in advance if you must reschedule your appointment and/or time.        Follow-up Information     Follow up with Pamela Espitia MD In 3 weeks.    Specialty:  Cardiology    Why:  As needed    Contact information:    [address]  [address]  [place] [id number] [phone]          Discharge Instructions     Future Orders    Activity as tolerated     Call MD for:  difficulty breathing, headache or visual disturbances     Call MD for:  persistent dizziness or light-headedness     Call MD for:  redness, tenderness, or signs of infection (pain, swelling, redness, odor or green/yellow discharge around incision site)     Call MD for:  severe uncontrolled pain     Call MD for:  temperature >100.4     Diet general     Questions:    Total calories:      Fat restriction, if any:      Protein restriction, if any:      Na restriction, if any:       "Fluid restriction:      Additional restrictions:      Remove dressing in 24 hours     Weight bearing restrictions (specify)     Comments:    Do not bend right wrist for 2 days. No heavy lifting with right arm for 1 week. Then resume usual activity.        Admission Information     Date & Time Provider Department CSN    3/9/2017  7:54 AM Pamela Espitia MD [facility] [id number]      Care Providers     Provider Role Specialty Primary office phone    Pamela Espitia MD Attending Provider Cardiology [phone]    Pamela Espitia MD Surgeon  Cardiology [phone]      Your Vitals Were     BP Pulse Temp Resp Height Weight    115/69 (BP Location: Left arm, Patient Position: Lying, BP Method: Automatic) 65 98 °F (36.7 °C) (Oral) 17 5' 6" (1.676 m) 71.2 kg (157 lb)    SpO2 BMI             100% 25.34 kg/m2         Recent Lab Values        12/11/2015 11/15/2016 2/23/2017                     9:55 AM 11:38 AM  7:11 AM         A1C 8.7 (H) 12.2 (H) 9.5 (H)         Comment for A1C at 11:38 AM on 11/15/2016:  According to ADA guidelines, hemoglobin A1C <7.0% represents  optimal control in non-pregnant diabetic patients.  Different  metrics may apply to specific populations.   Standards of Medical Care in Diabetes - 2016.  For the purpose of screening for the presence of diabetes:  <5.7%     Consistent with the absence of diabetes  5.7-6.4%  Consistent with increasing risk for diabetes   (prediabetes)  >or=6.5%  Consistent with diabetes  Currently no consensus exists for use of hemoglobin A1C  for diagnosis of diabetes for children.      Comment for A1C at  7:11 AM on 2/23/2017:  According to ADA guidelines, hemoglobin A1C <7.0% represents  optimal control in non-pregnant diabetic patients.  Different  metrics may apply to specific populations.   Standards of Medical Care in Diabetes - 2016.  For the purpose of screening for the presence of diabetes:  <5.7%     Consistent with the absence of " diabetes  5.7-6.4%  Consistent with increasing risk for diabetes   (prediabetes)  >or=6.5%  Consistent with diabetes  Currently no consensus exists for use of hemoglobin A1C  for diagnosis of diabetes for children.        Allergies as of 3/9/2017        Reactions    Aspirin       [facility]     [facility] Nurse Care Line - 24/7 Assistance  Unless otherwise directed by your provider, please contact [facility], our nurse care line that is available for 24/7 assistance.     Registered nurses in the [facility] provide clinical advisement, health education, appointment booking, and other advisory services.  Call for this free service at [phone].        Advance Directives     An advance directive is a document which, in the event you are no longer able to make decisions for yourself, tells your healthcare team what kind of treatment you do or do not want to receive, or who you would like to make those decisions for you.  If you do not currently have an advance directive, [facility] encourages you to create one.  For more information call:  [phone]), [phone] ([phone]),  or log on to [email or web address].        Language Assistance Services     ATTENTION: Language assistance services are available, free of charge. Please call [phone].      ATENCIÓN: Si habla español, tiene a posadas disposición servicios gratuitos de asistencia lingüística. Llame al [phone].     CHÚ Ý: N?u b?n nói Ti?ng Vi?t, có các d?ch v? h? tr? ngôn ng? mi?n phí dành cho b?n. G?i s? [phone].        Diabetes Discharge Instructions                                    [facility] complies with applicable Federal civil rights laws and does not discriminate on the basis of race, color, national origin, age, disability, or sex.

## 2017-03-09 NOTE — H&P
"Cardiology      SUBJECTIVE:     History of Present Illness:  Patient is a 56 y.o. male presents with intermittent atypical chest pains becoming more frequent since 02/2017. He had a + ambulatory stress echo in 02/2017 which prompted LHC/Cors today. Patient is aware of all risks and benefits and wishes to proceed. Last meal yesterday <10 PM. Not taking any systemic anticoagulants. On ASA/Plavix. Did not take metformin for past 3 days, did not take glipizide today AM b/c "sugar was good".       Review of patient's allergies indicates:   Allergen Reactions    Aspirin        Past Medical History:   Diagnosis Date    Diabetes mellitus, type II     HTN (hypertension)     Ulcerative colitis      Past Surgical History:   Procedure Laterality Date    CATARACT EXTRACTION Bilateral 2013    COLONOSCOPY N/A 1/22/2016    Procedure: COLONOSCOPY;  Surgeon: Aristeo Lynn Jr., MD;  [facility];  Service: Endoscopy;  Laterality: N/A;     Family History   Problem Relation Age of Onset    Coronary artery disease Mother 75    Coronary artery disease Father 55    Coronary artery disease Brother 59    Diabetes Brother     Prostate cancer Neg Hx     Colon cancer Neg Hx      Social History   Substance Use Topics    Smoking status: Former Smoker    Smokeless tobacco: Not on file    Alcohol use 0.0 oz/week     0 Standard drinks or equivalent per week      Comment: occasionally        Home meds:  No current facility-administered medications on file prior to encounter.      Current Outpatient Prescriptions on File Prior to Encounter   Medication Sig Dispense Refill    APRISO 0.375 gram Cp24 TAKE FOUR CAPSULES BY MOUTH ONCE DAILY 120 capsule 6    atorvastatin (LIPITOR) 20 MG tablet Take 1 tablet (20 mg total) by mouth every evening. 90 tablet 3    blood sugar diagnostic (BLOOD GLUCOSE TEST) Strp Dispense 1 meter and 100 test strips (insurance covered brand). Test sugar daily 100 strip 11    clopidogrel (PLAVIX) 75 " mg tablet Take 1 tablet (75 mg total) by mouth once daily. 30 tablet 11    glipiZIDE (GLUCOTROL) 10 MG tablet TAKE ONE TABLET BY MOUTH TWICE DAILY WITH MEALS 60 tablet 11    lisinopril 10 MG tablet Take 1 tablet (10 mg total) by mouth once daily. 30 tablet 11    metformin (GLUCOPHAGE) 1000 MG tablet Take 1 tablet (1,000 mg total) by mouth 2 (two) times daily with meals. 60 tablet 11    metoprolol succinate (TOPROL-XL) 25 MG 24 hr tablet Take 1 tablet (25 mg total) by mouth once daily. 30 tablet 11    nitroGLYCERIN (NITROSTAT) 0.4 MG SL tablet Place 1 tablet (0.4 mg total) under the tongue every 5 (five) minutes as needed for Chest pain. 30 tablet 1    pantoprazole (PROTONIX) 40 MG tablet          Current meds:  Scheduled Meds:  Continuous Infusions:  PRN Meds:.      OBJECTIVE:     Vital Signs (Most Recent)       Vital Signs Range (Last 24H):       Physical Exam:  GEN: nad   HEENT: ncat  Neck: supple, no jvd  Heart: s1,s2 rrr, no m/r/g  Lungs: cta b/l  Abdomen: nabs, soft, nt  Ext: no c/c/e, 2+ pp. 2+ radial pulses with normal Luis E test in RRA  : def  MSK:no deformities  Skin: def    Laboratory:  LABS  CBC  No results for input(s): WBC, RBC, HGB, HCT, PLT, MCV, MCH, MCHC in the last 168 hours.  BMP  No results for input(s): NA, K, CO2, CL, BUN, CREATININE, GLU in the last 168 hours.    Invalid input(s): GFR    No results for input(s): CALCIUM, MG, PHOS in the last 168 hours.    LFT  No results for input(s): PROT, ALBUMIN, BILITOT, AST, ALKPHOS, ALT in the last 168 hours.    COAGS  No results for input(s): INR, APTT in the last 168 hours.    Invalid input(s): PT  CE  No results for input(s): TROPONINI, CKTOTAL, CKMB in the last 168 hours.  BNP  No results for input(s): BNP in the last 168 hours.  Lipid panel:  Lab Results   Component Value Date    CHOL 215 (H) 11/15/2016    CHOL 195 12/11/2015     Lab Results   Component Value Date    HDL 38 (L) 11/15/2016    HDL 39 (L) 12/11/2015     Lab Results   Component  Value Date    LDLCALC 123.6 11/15/2016    LDLCALC 117.6 12/11/2015     Lab Results   Component Value Date    TRIG 267 (H) 11/15/2016    TRIG 192 (H) 12/11/2015     Lab Results   Component Value Date    CHOLHDL 17.7 (L) 11/15/2016    CHOLHDL 20.0 12/11/2015           Chart review:  Per clinic notes:  EKG 2/2017: NS TW changes  Stress test: DSE 2/2017:  + by EKG and new apical hypokinesis EF 40-45%    ASSESSMENT/PLAN:     R/o Obstructive CAD based on sx and + DSE  HTN  HLD  T2DM  VTE ppx: low risk      Plan:  For LHC/Cors today +/- PCI, will try RRA approach  If PCI then will have to stay overnight  Keep Metformin and glipizide on hold, can use ISS while I/P    Drew Baldwin MD  [facility]  [phone] (m)  [phone] (p)

## 2017-03-09 NOTE — BRIEF OP NOTE
Procedure: LHC/Cors/Vgram via RRA w/ moderate sedation  Staff: Dr. Espitia  Fellow: Dr. Cowan        Findings (please see full report):  3 vessel disease: LAD, Large OM1, OM2 and PDA  Vgram showed normal LV pressures and no gradient on pull back across the AV    Recommendation: CABG eval in the setting of T2DM    EBL: <25 cc

## 2017-03-09 NOTE — NURSING TRANSFER
Pt transferred via wheelchair to front of hospital with family.  Family drove hime home.  AVS information given to him as well as prescriptions and medication instructions.  IV d/sed.  Site is s/s free of hematoma.  Radial access site on right wrist is s/s free of hematoma. Dressing is CDI.  Discharge instructions given to patient and explained with pt able to teachback

## 2017-03-14 LAB — CORONARY STENOSIS: ABNORMAL

## 2017-03-16 ENCOUNTER — INITIAL CONSULT (OUTPATIENT)
Dept: CARDIOLOGY | Facility: CLINIC | Age: 57
End: 2017-03-16
Payer: MEDICAID

## 2017-03-16 VITALS
HEART RATE: 75 BPM | DIASTOLIC BLOOD PRESSURE: 75 MMHG | HEIGHT: 66 IN | SYSTOLIC BLOOD PRESSURE: 130 MMHG | BODY MASS INDEX: 25.83 KG/M2 | WEIGHT: 160.69 LBS

## 2017-03-16 DIAGNOSIS — I20.89 ANGINA EFFORT: ICD-10-CM

## 2017-03-16 DIAGNOSIS — I10 ESSENTIAL HYPERTENSION: ICD-10-CM

## 2017-03-16 DIAGNOSIS — K51.90 ULCERATIVE COLITIS WITHOUT COMPLICATIONS, UNSPECIFIED LOCATION: ICD-10-CM

## 2017-03-16 DIAGNOSIS — I25.10 CORONARY ARTERY DISEASE INVOLVING NATIVE CORONARY ARTERY OF NATIVE HEART WITHOUT ANGINA PECTORIS: Primary | ICD-10-CM

## 2017-03-16 DIAGNOSIS — I25.5 CARDIOMYOPATHY, ISCHEMIC: Primary | ICD-10-CM

## 2017-03-16 DIAGNOSIS — I20.89 CHRONIC STABLE ANGINA: ICD-10-CM

## 2017-03-16 PROCEDURE — 99204 OFFICE O/P NEW MOD 45 MIN: CPT | Mod: S$PBB,,, | Performed by: INTERNAL MEDICINE

## 2017-03-16 PROCEDURE — 99213 OFFICE O/P EST LOW 20 MIN: CPT | Mod: PBBFAC | Performed by: INTERNAL MEDICINE

## 2017-03-16 PROCEDURE — 99999 PR PBB SHADOW E&M-EST. PATIENT-LVL III: CPT | Mod: PBBFAC,,, | Performed by: INTERNAL MEDICINE

## 2017-03-16 RX ORDER — VALSARTAN 160 MG/1
160 TABLET ORAL DAILY
Qty: 90 TABLET | Refills: 3 | Status: SHIPPED | OUTPATIENT
Start: 2017-03-16 | End: 2018-03-20 | Stop reason: SDUPTHER

## 2017-03-16 NOTE — PROGRESS NOTES
Cardiology Clinic Note  Reason for Visit: CAD  Referring MD: Self referral    HPI:   Michoacano Lind is a 56 y.o. male with diabetes mellitus type 2 and HTN who was admitted to an [facility] with angina 2 weeks ago and a subsequent coronary angiogram revealed multivessel CAD and reduced LVEF. He was recommended for surgery but CTS, but Dr. Espitia's note states that Dr. Disla, CTS  reviewed his angiograma nd determined that he did not have good targets for revascularization. He is on medical management currently and is here for a second opinion.Addtionally he wishes to transfer his cardiology care to [facility].    He has had 1-2 anginal episodes a month since his cath but reports he has not pushed himself. He does report DENNIS walking 1/2 mile    No palpitations, syncopal events, orthopnea or PND. He also has ulcerative colitis and is unable to take asa and is currently on plavix    His father had a history of a MI at age 55 which he  from. His brother has CAD at age of 59. He is a former smoker (quit many years ago).     ROS:    Constitution: Negative for fever or chills. Negative for weight loss or gain.   HENT: Negative for sore throat or headaches. Negative for rhinorrhea.  Eyes: Negative for blurred or double vision.   Cardiovascular: See above  Pulmonary: Positive for SOB. Negative for cough.   Gastrointestinal: Negative for abdominal pain. Negative for nausea/ vomiting. Negative for diarrhea.   : Negative for dysuria.   Neurological: Negative for focal weakness or sensory changes.  PMH:     Past Medical History:   Diagnosis Date    Diabetes mellitus, type II     HTN (hypertension)     Ulcerative colitis      Past Surgical History:   Procedure Laterality Date    CATARACT EXTRACTION Bilateral     COLONOSCOPY N/A 2016    Procedure: COLONOSCOPY;  Surgeon: Aristeo Lynn Jr., MD;  Location: [facility];  Service: Endoscopy;  Laterality: N/A;     Allergies:     Review of patient's allergies  indicates:   Allergen Reactions    Aspirin     Lisinopril Other (See Comments)     Cough 2/2 aceI     Medications:     Current Outpatient Prescriptions on File Prior to Visit   Medication Sig Dispense Refill    atorvastatin (LIPITOR) 80 MG tablet Take 1 tablet (80 mg total) by mouth once daily. 90 tablet 3    blood sugar diagnostic (BLOOD GLUCOSE TEST) Strp Dispense 1 meter and 100 test strips (insurance covered brand). Test sugar daily 100 strip 11    clopidogrel (PLAVIX) 75 mg tablet Take 1 tablet (75 mg total) by mouth once daily. 30 tablet 11    isosorbide mononitrate (IMDUR) 30 MG 24 hr tablet Take 1 tablet (30 mg total) by mouth once daily. 30 tablet 11    metoprolol succinate (TOPROL-XL) 25 MG 24 hr tablet Take 1 tablet (25 mg total) by mouth once daily. (Patient taking differently: Take 25 mg by mouth 2 (two) times daily. ) 30 tablet 11    pantoprazole (PROTONIX) 40 MG tablet       [DISCONTINUED] valsartan (DIOVAN) 80 MG tablet Take 1 tablet (80 mg total) by mouth once daily. 90 tablet 3    APRISO 0.375 gram Cp24 TAKE FOUR CAPSULES BY MOUTH ONCE DAILY 120 capsule 6    glipiZIDE (GLUCOTROL) 10 MG tablet TAKE ONE TABLET BY MOUTH TWICE DAILY WITH MEALS 60 tablet 11    nitroGLYCERIN (NITROSTAT) 0.4 MG SL tablet Place 1 tablet (0.4 mg total) under the tongue every 5 (five) minutes as needed for Chest pain. 30 tablet 1     No current facility-administered medications on file prior to visit.      Social History:     Social History   Substance Use Topics    Smoking status: Former Smoker     Quit date: 3/16/2008    Smokeless tobacco: Not on file    Alcohol use 0.0 oz/week     0 Standard drinks or equivalent per week      Comment: occasionally     Family History:     Family History   Problem Relation Age of Onset    Coronary artery disease Mother 75    Coronary artery disease Father 55    Coronary artery disease Brother 59    Diabetes Brother     Prostate cancer Neg Hx     Colon cancer Neg Hx   "    Physical Exam:   /75 (BP Location: Right arm, Patient Position: Sitting, BP Method: Automatic)  Pulse 75  Ht 5' 6" (1.676 m)  Wt 72.9 kg (160 lb 11.5 oz)  BMI 25.94 kg/m2     Constitutional: No acute distress, conversant  HEENT: Sclera anicteric, Pupils equal, round and reactive to light, extraocular motions intact, Oropharynx clear  Neck: No JVD, no carotid bruits  Cardiovascular: regular rate and rhythm, no murmur, rubs or gallops, normal S1/S2  Pulmonary: Clear to auscultation bilaterally  Abdominal: Abdomen soft, nontender, nondistended, positive bowel sounds  Extremities: No lower extremity edema,   Pulses: 2+ BL Radial, 2+ BL carotid, 2+ BL DP, 2+ BL PT, normal Allens Test BL  Skin: No ecchymosis, erythema, or ulcers  Psych: Alert and oriented x 3, appropriate affect  Neuro: CNII-XII intact, no focal deficits    Labs:     Lab Results   Component Value Date     03/09/2017    K 4.1 03/09/2017     03/09/2017    CO2 26 03/09/2017    BUN 8 03/09/2017    CREATININE 1.0 03/09/2017    ANIONGAP 11 03/09/2017     Lab Results   Component Value Date    AST 13 02/23/2017    ALT 16 02/23/2017    ALKPHOS 82 02/23/2017    BILITOT 0.7 02/23/2017    ALBUMIN 3.9 02/23/2017     Lab Results   Component Value Date    CALCIUM 9.6 03/09/2017     No results found for: BNP, BNPTRIAGEBLO Lab Results   Component Value Date    WBC 10.03 03/09/2017    HGB 12.2 (L) 03/09/2017    HCT 37.0 (L) 03/09/2017     03/09/2017    GRAN 7.1 03/09/2017    GRAN 70.9 03/09/2017     Lab Results   Component Value Date    INR 1.1 03/09/2017     Lab Results   Component Value Date    CHOL 215 (H) 11/15/2016    HDL 38 (L) 11/15/2016    LDLCALC 123.6 11/15/2016    TRIG 267 (H) 11/15/2016     Lab Results   Component Value Date    HGBA1C 9.5 (H) 02/23/2017     Lab Results   Component Value Date    TSH 2.528 02/22/2017          Imaging:     EF   Date Value Ref Range Status   02/23/2017 40 (A) 55 - 65          Assessment:    "   Patient Active Problem List   Diagnosis    Colitis    Idiopathic ulcerative colitis    Screening for colorectal cancer    Chronic stable angina    Uncontrolled type 2 diabetes mellitus without complication, without long-term current use of insulin    Essential hypertension    Precordial pain    Cardiomyopathy, ischemic       Plan:     1) CAD  - multivessel disease not amenable to surgical revascularization per chart  - continue plavix/ statin/ BB/imdur  - will obtain a PET stress to assess for ischemic burden and revasc strategy percutaneously considering he has multivessel disease in small vessels    2) HLD  - continue statin  - avoid saturated fats(Ghee) and high cholesterol content foods    3) DM2 - poorly controlled with last HgbA1c of 9.5  - improve diet and compliance with meds  -check FS twice daily at home and maintain a log  - may need increased dose of sulfonylureas  -will refer to endocrinology after PET stress for assistance in helping patient attain glycemic control    4) iCMP  - continue BB  - increase diovan to 160 mg daily        Signed:  Marcelo Du MD  Interventional Cardiology Fellow  [phone]  3/16/2017 4:40 PM    I have personally taken the history and examined this patient and agree with the resident's note as stated above.  All of the patient's questions were answered.

## 2017-03-18 PROBLEM — I25.5 CARDIOMYOPATHY, ISCHEMIC: Status: ACTIVE | Noted: 2017-03-18

## 2017-03-18 PROBLEM — I20.89 CHRONIC STABLE ANGINA: Status: ACTIVE | Noted: 2017-02-22

## 2017-03-20 ENCOUNTER — OFFICE VISIT (OUTPATIENT)
Dept: FAMILY MEDICINE | Facility: CLINIC | Age: 57
End: 2017-03-20
Payer: MEDICAID

## 2017-03-20 VITALS
WEIGHT: 157.44 LBS | DIASTOLIC BLOOD PRESSURE: 82 MMHG | HEART RATE: 74 BPM | OXYGEN SATURATION: 98 % | BODY MASS INDEX: 25.3 KG/M2 | HEIGHT: 66 IN | SYSTOLIC BLOOD PRESSURE: 138 MMHG | TEMPERATURE: 98 F

## 2017-03-20 DIAGNOSIS — R05.8 ALLERGIC COUGH: Primary | ICD-10-CM

## 2017-03-20 DIAGNOSIS — J30.2 SEASONAL ALLERGIC RHINITIS, UNSPECIFIED ALLERGIC RHINITIS TRIGGER: ICD-10-CM

## 2017-03-20 PROCEDURE — 99214 OFFICE O/P EST MOD 30 MIN: CPT | Mod: S$PBB,,, | Performed by: FAMILY MEDICINE

## 2017-03-20 PROCEDURE — 99213 OFFICE O/P EST LOW 20 MIN: CPT | Mod: PBBFAC,PO | Performed by: FAMILY MEDICINE

## 2017-03-20 PROCEDURE — 99999 PR PBB SHADOW E&M-EST. PATIENT-LVL III: CPT | Mod: PBBFAC,,, | Performed by: FAMILY MEDICINE

## 2017-03-20 RX ORDER — BENZONATATE 100 MG/1
100 CAPSULE ORAL 3 TIMES DAILY PRN
Qty: 20 CAPSULE | Refills: 0 | Status: SHIPPED | OUTPATIENT
Start: 2017-03-20 | End: 2017-03-30

## 2017-03-20 RX ORDER — FLUTICASONE PROPIONATE 50 MCG
1 SPRAY, SUSPENSION (ML) NASAL DAILY
Qty: 1 BOTTLE | Refills: 0 | Status: SHIPPED | OUTPATIENT
Start: 2017-03-20 | End: 2017-07-27

## 2017-03-20 NOTE — PROGRESS NOTES
Subjective:       Patient ID: Michoacano Lind is a 56 y.o. male.    Chief Complaint: Cough and Sinusitis    HPI Comments: 56 yr old pleasant male with DM II, cardiomyopathy, UC, allergies, presents today as new patient to me and for evaluation of cough with nasal congestion. Onset 3-4 days ago and gradually improving since this morning. Had fever 100.5 yesterday but none since then. He feels better today. No chest pain/SOB. He also reported pain in chest wall with cough. No SOB or DENNIS. No sick contacts/recent travel. Details as follows -      History as below - reviewed     Cough   This is a new problem. The current episode started in the past 7 days. The problem has been gradually improving. The problem occurs every few hours. The cough is non-productive. Pertinent negatives include no chest pain, ear pain, myalgias, rash, rhinorrhea or wheezing. Nothing aggravates the symptoms. He has tried OTC cough suppressant for the symptoms. The treatment provided no relief. His past medical history is significant for environmental allergies. There is no history of asthma or bronchitis.   URI    This is a new problem. The current episode started in the past 7 days. The problem has been gradually improving. The maximum temperature recorded prior to his arrival was 100.4 - 100.9 F. The fever has been present for less than 1 day. Associated symptoms include congestion and coughing. Pertinent negatives include no chest pain, diarrhea, ear pain, rash, rhinorrhea, vomiting or wheezing. He has tried NSAIDs, antihistamine and decongestant for the symptoms. The treatment provided moderate relief.     Review of Systems   Constitutional: Negative.  Negative for activity change, diaphoresis and unexpected weight change.   HENT: Positive for congestion. Negative for ear pain, mouth sores, rhinorrhea and voice change.    Eyes: Negative.  Negative for pain, discharge and visual disturbance.   Respiratory: Positive for cough. Negative for  apnea and wheezing.    Cardiovascular: Negative.  Negative for chest pain and palpitations.   Gastrointestinal: Negative.  Negative for abdominal distention, anal bleeding, diarrhea and vomiting.   Endocrine: Negative.  Negative for cold intolerance and polyuria.   Genitourinary: Negative.  Negative for decreased urine volume, difficulty urinating, discharge, frequency and scrotal swelling.   Musculoskeletal: Negative.  Negative for back pain, myalgias and neck stiffness.   Skin: Negative.  Negative for color change and rash.   Allergic/Immunologic: Positive for environmental allergies. Negative for immunocompromised state.   Neurological: Negative.  Negative for dizziness, speech difficulty, weakness and light-headedness.   Hematological: Negative.    Psychiatric/Behavioral: Negative.  Negative for agitation, dysphoric mood and suicidal ideas. The patient is not nervous/anxious.        PMH/PSH/FH/SH/MED/ALLERGY reviewed    Objective:       Vitals:    03/20/17 1547   BP: 138/82   Pulse: 74   Temp: 97.9 °F (36.6 °C)       Physical Exam   Constitutional: He is oriented to person, place, and time. He appears well-developed and well-nourished.   HENT:   Head: Normocephalic and atraumatic.   Right Ear: External ear normal.   Left Ear: External ear normal.   Nose: Nose normal.   Mouth/Throat: Oropharynx is clear and moist. No oropharyngeal exudate.   Mucosal edema with stuffy nose   Eyes: Conjunctivae and EOM are normal. Pupils are equal, round, and reactive to light. Right eye exhibits no discharge. Left eye exhibits no discharge. No scleral icterus.   Neck: Normal range of motion. Neck supple. No JVD present. No tracheal deviation present. No thyromegaly present.   Cardiovascular: Normal rate, regular rhythm, normal heart sounds and intact distal pulses.  Exam reveals no gallop and no friction rub.    No murmur heard.  Pulmonary/Chest: Effort normal and breath sounds normal. No stridor. No respiratory distress. He has  no wheezes. He has no rales. He exhibits no tenderness.   Abdominal: Soft. Bowel sounds are normal. He exhibits no distension and no mass. There is no tenderness. There is no rebound and no guarding. No hernia.   Musculoskeletal: Normal range of motion. He exhibits no edema or tenderness.   Lymphadenopathy:     He has no cervical adenopathy.   Neurological: He is alert and oriented to person, place, and time. He has normal reflexes. He displays normal reflexes. No cranial nerve deficit. He exhibits normal muscle tone. Coordination normal.   Skin: Skin is warm and dry. No rash noted. No erythema. No pallor.   Psychiatric: He has a normal mood and affect. His behavior is normal. Judgment and thought content normal.       Assessment:       1. Allergic cough    2. Seasonal allergic rhinitis, unspecified allergic rhinitis trigger        Plan:       Michoacano was seen today for cough and sinusitis.    Diagnoses and all orders for this visit:    Allergic cough  -     benzonatate (TESSALON) 100 MG capsule; Take 1 capsule (100 mg total) by mouth 3 (three) times daily as needed.    Seasonal allergic rhinitis, unspecified allergic rhinitis trigger  -     fluticasone (FLONASE) 50 mcg/actuation nasal spray; 1 spray by Each Nare route once daily.      Allergic cough/viral cough  -symptomatic relief with tessalon perles    AR  -refilled Flonase    Spent adequate time in obtaining history and explaining differentials    40 minutes spent during this visit of which greater than 50% devoted to face-face counseling and coordination of care regarding diagnosis and management plan    Return if symptoms worsen or fail to improve.

## 2017-03-20 NOTE — MR AVS SNAPSHOT
[facility]  [address]  [place] [address]  Phone: [phone]  Fax: [phone]                  Micohacano Lind   3/20/2017 3:20 PM   Office Visit    Description:  Male : 1960   Provider:  Parker Alonzo MD   Department:  [facility]           Reason for Visit     Cough     Sinusitis           Diagnoses this Visit        Comments    Allergic cough    -  Primary     Seasonal allergic rhinitis, unspecified allergic rhinitis trigger                To Do List           Future Appointments        Provider Department Dept Phone    3/23/2017 9:00 AM APPOINTMENT LAB, [place] [facility] [phone]    3/28/2017 10:00 AM CARDIAC, PET IMAGING [facility] - Cardiac -[phone]      Goals (5 Years of Data)     None      Follow-Up and Disposition     Return if symptoms worsen or fail to improve.       These Medications        Disp Refills Start End    benzonatate (TESSALON) 100 MG capsule 20 capsule 0 3/20/2017 3/30/2017    Take 1 capsule (100 mg total) by mouth 3 (three) times daily as needed. - Oral    Pharmacy: [facility]  [place] [address] Ph #: [phone]       fluticasone (FLONASE) 50 mcg/actuation nasal spray 1 Bottle 0 3/20/2017     1 spray by Each Nare route once daily. - Each Nare    [facility]  [place], [address] Ph #: [phone]         [facility]     [facility] Care [facility] -  Assistance  Registered nurses in the [facility] provide clinical advisement, health education, appointment booking, and other advisory services.  Call for this free service at [phone].             Medications           Message regarding Medications     Verify the changes and/or additions to your medication regime listed below are the same as discussed with your clinician today.  If any of these changes or additions are incorrect, please notify  your healthcare provider.        START taking these NEW medications        Refills    benzonatate (TESSALON) 100 MG capsule 0    Sig: Take 1 capsule (100 mg total) by mouth 3 (three) times daily as needed.    Class: Normal    Route: Oral    fluticasone (FLONASE) 50 mcg/actuation nasal spray 0    Si spray by Each Nare route once daily.    Class: Normal    Route: Each Nare           Verify that the below list of medications is an accurate representation of the medications you are currently taking.  If none reported, the list may be blank. If incorrect, please contact your healthcare provider. Carry this list with you in case of emergency.           Current Medications     APRISO 0.375 gram Cp24 TAKE FOUR CAPSULES BY MOUTH ONCE DAILY    atorvastatin (LIPITOR) 80 MG tablet Take 1 tablet (80 mg total) by mouth once daily.    benzonatate (TESSALON) 100 MG capsule Take 1 capsule (100 mg total) by mouth 3 (three) times daily as needed.    blood sugar diagnostic (BLOOD GLUCOSE TEST) Strp Dispense 1 meter and 100 test strips (insurance covered brand). Test sugar daily    clopidogrel (PLAVIX) 75 mg tablet Take 1 tablet (75 mg total) by mouth once daily.    fluticasone (FLONASE) 50 mcg/actuation nasal spray 1 spray by Each Nare route once daily.    glipiZIDE (GLUCOTROL) 10 MG tablet TAKE ONE TABLET BY MOUTH TWICE DAILY WITH MEALS    isosorbide mononitrate (IMDUR) 30 MG 24 hr tablet Take 1 tablet (30 mg total) by mouth once daily.    metoprolol succinate (TOPROL-XL) 25 MG 24 hr tablet Take 1 tablet (25 mg total) by mouth once daily.    nitroGLYCERIN (NITROSTAT) 0.4 MG SL tablet Place 1 tablet (0.4 mg total) under the tongue every 5 (five) minutes as needed for Chest pain.    pantoprazole (PROTONIX) 40 MG tablet     valsartan (DIOVAN) 160 MG tablet Take 1 tablet (160 mg total) by mouth once daily.           Clinical Reference Information           Your Vitals Were     BP Pulse Temp Height Weight SpO2    138/82 74 97.9 °F  "(36.6 °C) (Oral) 5' 6" (1.676 m) 71.4 kg (157 lb 6.5 oz) 98%    BMI                25.41 kg/m2          Blood Pressure          Most Recent Value    BP  138/82      Allergies as of 3/20/2017     Aspirin    Lisinopril      Immunizations Administered on Date of Encounter - 3/20/2017     None      Language Assistance Services     ATTENTION: Language assistance services are available, free of charge. Please call [phone].      ATENCIÓN: Si habla amador, tiene a posadas disposición servicios gratuitos de asistencia lingüística. Llame al [phone].     CHÚ Ý: N?u b?n nói Ti?ng Vi?t, có các d?ch v? h? tr? ngôn ng? mi?n phí dành cho b?n. G?i s? [phone].         [facility] complies with applicable Federal civil rights laws and does not discriminate on the basis of race, color, national origin, age, disability, or sex.        "

## 2017-03-23 ENCOUNTER — LAB VISIT (OUTPATIENT)
Dept: LAB | Facility: HOSPITAL | Age: 57
End: 2017-03-23
Attending: INTERNAL MEDICINE
Payer: MEDICAID

## 2017-03-23 DIAGNOSIS — I25.10 CORONARY ARTERY DISEASE INVOLVING NATIVE CORONARY ARTERY OF NATIVE HEART WITHOUT ANGINA PECTORIS: ICD-10-CM

## 2017-03-23 LAB
ANION GAP SERPL CALC-SCNC: 7 MMOL/L
BUN SERPL-MCNC: 9 MG/DL
CALCIUM SERPL-MCNC: 9.1 MG/DL
CHLORIDE SERPL-SCNC: 101 MMOL/L
CO2 SERPL-SCNC: 26 MMOL/L
CREAT SERPL-MCNC: 0.9 MG/DL
EST. GFR  (AFRICAN AMERICAN): >60 ML/MIN/1.73 M^2
EST. GFR  (NON AFRICAN AMERICAN): >60 ML/MIN/1.73 M^2
GLUCOSE SERPL-MCNC: 256 MG/DL
POTASSIUM SERPL-SCNC: 4.4 MMOL/L
SODIUM SERPL-SCNC: 134 MMOL/L

## 2017-03-23 PROCEDURE — 80048 BASIC METABOLIC PNL TOTAL CA: CPT

## 2017-03-23 PROCEDURE — 36415 COLL VENOUS BLD VENIPUNCTURE: CPT

## 2017-03-27 DIAGNOSIS — I25.10 CORONARY ARTERY DISEASE INVOLVING NATIVE CORONARY ARTERY OF NATIVE HEART, ANGINA PRESENCE UNSPECIFIED: Primary | ICD-10-CM

## 2017-03-27 DIAGNOSIS — I25.10 CORONARY ARTERY DISEASE INVOLVING NATIVE CORONARY ARTERY OF NATIVE HEART WITHOUT ANGINA PECTORIS: Primary | ICD-10-CM

## 2017-03-28 RX ORDER — METOPROLOL SUCCINATE 25 MG/1
25 TABLET, EXTENDED RELEASE ORAL DAILY
Qty: 30 TABLET | Refills: 11 | Status: SHIPPED | OUTPATIENT
Start: 2017-03-28 | End: 2018-03-29 | Stop reason: SDUPTHER

## 2017-04-04 ENCOUNTER — HOSPITAL ENCOUNTER (OUTPATIENT)
Dept: CARDIOLOGY | Facility: CLINIC | Age: 57
Discharge: HOME | End: 2017-04-04
Payer: MEDICAID

## 2017-04-04 ENCOUNTER — HOSPITAL ENCOUNTER (OUTPATIENT)
Dept: RADIOLOGY | Facility: HOSPITAL | Age: 57
Discharge: HOME | End: 2017-04-04
Attending: INTERNAL MEDICINE
Payer: MEDICAID

## 2017-04-04 DIAGNOSIS — I10 HTN (HYPERTENSION): ICD-10-CM

## 2017-04-04 DIAGNOSIS — I42.9 CARDIOMYOPATHY: ICD-10-CM

## 2017-04-04 DIAGNOSIS — E11.9 DM (DIABETES MELLITUS): ICD-10-CM

## 2017-04-04 DIAGNOSIS — I25.10 CORONARY ARTERY DISEASE INVOLVING NATIVE CORONARY ARTERY OF NATIVE HEART WITHOUT ANGINA PECTORIS: ICD-10-CM

## 2017-04-04 DIAGNOSIS — I25.10 CAD (CORONARY ARTERY DISEASE): ICD-10-CM

## 2017-04-04 LAB — DIASTOLIC DYSFUNCTION: NO

## 2017-04-04 PROCEDURE — 93018 CV STRESS TEST I&R ONLY: CPT | Mod: S$PBB,,, | Performed by: INTERNAL MEDICINE

## 2017-04-04 PROCEDURE — 78452 HT MUSCLE IMAGE SPECT MULT: CPT | Mod: 26,,, | Performed by: NUCLEAR MEDICINE

## 2017-04-04 PROCEDURE — 93016 CV STRESS TEST SUPVJ ONLY: CPT | Mod: S$PBB,,, | Performed by: INTERNAL MEDICINE

## 2017-04-07 ENCOUNTER — TELEPHONE (OUTPATIENT)
Dept: CARDIOLOGY | Facility: CLINIC | Age: 57
End: 2017-04-07

## 2017-04-07 DIAGNOSIS — E11.59 TYPE 2 DIABETES MELLITUS WITH OTHER CIRCULATORY COMPLICATION: Primary | ICD-10-CM

## 2017-04-07 NOTE — TELEPHONE ENCOUNTER
I called the patient with the results of his stress test from 3/27/17. No ischemia was detected.  Furthermore the patient reports that he is asymptomatic.  Therefore rec continued medical management of CAD  -Rec tight glycemic control of DM2; will refer the patient to endocrinology for assistance  -Rec aerobic exercise program  -Patient was commended on modifying his diet to a heart healthy vegetarian diet  All of the patient's questions were answered.  Follow-up in clinic in 3 months

## 2017-04-17 ENCOUNTER — TELEPHONE (OUTPATIENT)
Dept: FAMILY MEDICINE | Facility: CLINIC | Age: 57
End: 2017-04-17

## 2017-04-17 NOTE — TELEPHONE ENCOUNTER
----- Message from Ijeoma Hurtado sent at 4/17/2017 10:27 AM CDT -----  Contact: [phone]  Pt its requesting an appointment for this week , states he has problems with his diabetic supplies . Please advise

## 2017-04-20 RX ORDER — METFORMIN HYDROCHLORIDE 500 MG/1
1000 TABLET, EXTENDED RELEASE ORAL 2 TIMES DAILY WITH MEALS
Qty: 120 TABLET | Refills: 11 | Status: SHIPPED | OUTPATIENT
Start: 2017-04-20 | End: 2018-06-11 | Stop reason: SDUPTHER

## 2017-04-21 ENCOUNTER — PATIENT MESSAGE (OUTPATIENT)
Dept: FAMILY MEDICINE | Facility: CLINIC | Age: 57
End: 2017-04-21

## 2017-04-21 NOTE — TELEPHONE ENCOUNTER
i'll change to the extended release metformin (2 pills twice daily ) IN ADDITION to his glipizide. The extended release metformin usually doesn't given the GI effects as much as the regular metformin. i'll send to his pharmacy; will need rpt visit 3 mo.

## 2017-07-27 ENCOUNTER — OFFICE VISIT (OUTPATIENT)
Dept: CARDIOLOGY | Facility: CLINIC | Age: 57
End: 2017-07-27
Payer: MEDICAID

## 2017-07-27 VITALS
WEIGHT: 155 LBS | BODY MASS INDEX: 24.91 KG/M2 | DIASTOLIC BLOOD PRESSURE: 73 MMHG | SYSTOLIC BLOOD PRESSURE: 122 MMHG | HEART RATE: 70 BPM | HEIGHT: 66 IN

## 2017-07-27 DIAGNOSIS — I25.10 CORONARY ARTERY DISEASE INVOLVING NATIVE CORONARY ARTERY OF NATIVE HEART WITHOUT ANGINA PECTORIS: ICD-10-CM

## 2017-07-27 DIAGNOSIS — E78.5 DYSLIPIDEMIA: ICD-10-CM

## 2017-07-27 DIAGNOSIS — I10 ESSENTIAL HYPERTENSION: ICD-10-CM

## 2017-07-27 DIAGNOSIS — I25.5 CARDIOMYOPATHY, ISCHEMIC: ICD-10-CM

## 2017-07-27 PROCEDURE — 3046F HEMOGLOBIN A1C LEVEL >9.0%: CPT | Mod: ,,, | Performed by: INTERNAL MEDICINE

## 2017-07-27 PROCEDURE — 4010F ACE/ARB THERAPY RXD/TAKEN: CPT | Mod: ,,, | Performed by: INTERNAL MEDICINE

## 2017-07-27 PROCEDURE — 99213 OFFICE O/P EST LOW 20 MIN: CPT | Mod: PBBFAC | Performed by: INTERNAL MEDICINE

## 2017-07-27 PROCEDURE — 99213 OFFICE O/P EST LOW 20 MIN: CPT | Mod: S$PBB,,, | Performed by: INTERNAL MEDICINE

## 2017-07-27 PROCEDURE — 99999 PR PBB SHADOW E&M-EST. PATIENT-LVL III: CPT | Mod: PBBFAC,,, | Performed by: INTERNAL MEDICINE

## 2017-07-28 ENCOUNTER — TELEPHONE (OUTPATIENT)
Dept: CARDIOLOGY | Facility: CLINIC | Age: 57
End: 2017-07-28

## 2017-07-29 PROBLEM — I20.89 CHRONIC STABLE ANGINA: Status: RESOLVED | Noted: 2017-02-22 | Resolved: 2017-07-29

## 2017-07-29 PROBLEM — I25.10 CORONARY ARTERY DISEASE INVOLVING NATIVE CORONARY ARTERY OF NATIVE HEART WITHOUT ANGINA PECTORIS: Status: ACTIVE | Noted: 2017-07-29

## 2017-07-30 NOTE — PROGRESS NOTES
Subjective:    Patient ID:  Michoacano Lind is a 57 y.o. male who presents for follow-up of Coronary Artery Disease      HPI  Michoacano Lind is a 57 y.o. male with diabetes mellitus type 2 and HTN who was admitted to an [facility] with angina in 2017  and a subsequent coronary angiogram revealed multivessel CAD and reduced LVEF. He was recommended for surgery but CTS, but Dr. Espitia's note states that Dr. Disla, [facility]  reviewed his angiograma and determined that he did not have good targets for revascularization. At that time he  transferred his cardiology care to [facility].  He was last seen in this clinic on 3/18/17. In the interim since his last clinic visi the denies angina. Strip edema orthopnea or PND. He denies palpitations syncope or near syncope.The patient intentionally lost weight. Previously he was 160 pounds today he is 154 pounds.The patient has been exercising 2 to 3 days a week performing yoga and walking.  He reports good medication compliance and adherence to a heart healthy, vegetarian diet.  Of note, his father had a history of a MI at age 55 which he  from. His brother was diagnosed with  CAD at age of 59. He is a former smoker (quit many years ago).     Past Medical History:   Diagnosis Date    Coronary artery disease involving native coronary artery of native heart without angina pectoris 2017    Diabetes mellitus, type II     HTN (hypertension)     Ulcerative colitis      Past Surgical History:   Procedure Laterality Date    CATARACT EXTRACTION Bilateral     COLONOSCOPY N/A 2016    Procedure: COLONOSCOPY;  Surgeon: Aristeo Lynn Jr., MD;  Location: [facility];  Service: Endoscopy;  Laterality: N/A;     Current Outpatient Prescriptions on File Prior to Visit   Medication Sig Dispense Refill    APRISO 0.375 gram Cp24 TAKE FOUR CAPSULES BY MOUTH ONCE DAILY 120 capsule 6    atorvastatin (LIPITOR) 80 MG tablet Take 1 tablet (80 mg total) by mouth once daily.  90 tablet 3    blood sugar diagnostic (BLOOD GLUCOSE TEST) Strp Dispense 1 meter and 100 test strips (insurance covered brand). Test sugar daily 100 strip 11    clopidogrel (PLAVIX) 75 mg tablet Take 1 tablet (75 mg total) by mouth once daily. 30 tablet 11    glipiZIDE (GLUCOTROL) 10 MG tablet TAKE ONE TABLET BY MOUTH TWICE DAILY WITH MEALS 60 tablet 11    isosorbide mononitrate (IMDUR) 30 MG 24 hr tablet Take 1 tablet (30 mg total) by mouth once daily. 30 tablet 11    metformin (GLUCOPHAGE-XR) 500 MG 24 hr tablet Take 2 tablets (1,000 mg total) by mouth 2 (two) times daily with meals. 120 tablet 11    metoprolol succinate (TOPROL-XL) 25 MG 24 hr tablet Take 1 tablet (25 mg total) by mouth once daily. 30 tablet 11    nitroGLYCERIN (NITROSTAT) 0.4 MG SL tablet Place 1 tablet (0.4 mg total) under the tongue every 5 (five) minutes as needed for Chest pain. 30 tablet 1    pantoprazole (PROTONIX) 40 MG tablet       valsartan (DIOVAN) 160 MG tablet Take 1 tablet (160 mg total) by mouth once daily. 90 tablet 3     No current facility-administered medications on file prior to visit.      Review of patient's allergies indicates:   Allergen Reactions    Aspirin     Lisinopril Other (See Comments)     Cough 2/2 aceI     Social History   Substance Use Topics    Smoking status: Former Smoker     Quit date: 3/16/2008    Smokeless tobacco: Former User    Alcohol use 0.0 oz/week      Comment: occasionally     Family History   Problem Relation Age of Onset    Coronary artery disease Mother 75    Coronary artery disease Father 55    Coronary artery disease Brother 59    Diabetes Brother     Prostate cancer Neg Hx     Colon cancer Neg Hx           Review of Systems   Constitution: Negative for decreased appetite, diaphoresis, fever, malaise/fatigue, weight gain and weight loss.   HENT: Negative for congestion, nosebleeds and sore throat.    Eyes: Negative for blurred vision, vision loss in left eye, vision loss in  "right eye and visual disturbance.   Cardiovascular: Negative for chest pain, claudication, dyspnea on exertion, leg swelling, near-syncope, orthopnea, palpitations, paroxysmal nocturnal dyspnea and syncope.   Respiratory: Negative for cough, hemoptysis, shortness of breath and wheezing.    Endocrine: Negative for polyuria.   Hematologic/Lymphatic: Does not bruise/bleed easily.   Skin: Negative for nail changes and rash.   Musculoskeletal: Negative for back pain, muscle cramps and myalgias.   Gastrointestinal: Negative for abdominal pain, change in bowel habit, diarrhea, heartburn, hematemesis, hematochezia, melena, nausea and vomiting.   Genitourinary: Negative for bladder incontinence, dysuria, frequency and hematuria.   Psychiatric/Behavioral: Negative for depression.   Allergic/Immunologic: Negative for hives.        Objective:  Vitals:    07/27/17 0921 07/27/17 0927   BP: 131/74 122/73   BP Location: Right arm Left arm   Patient Position: Sitting Sitting   Pulse: 70    Weight: 70.3 kg (154 lb 15.7 oz)    Height: 5' 6" (1.676 m)          Physical Exam   Constitutional: He is oriented to person, place, and time. He appears well-developed and well-nourished.   HENT:   Head: Normocephalic and atraumatic.   Eyes: EOM are normal. Pupils are equal, round, and reactive to light.   Neck: Neck supple. No JVD present. No thyromegaly present.   Cardiovascular: Normal rate and regular rhythm.  PMI is displaced.  Exam reveals no gallop and no friction rub.    No murmur heard.  Pulses:       Carotid pulses are 2+ on the right side, and 2+ on the left side.       Radial pulses are 2+ on the right side, and 2+ on the left side.        Femoral pulses are 2+ on the right side, and 2+ on the left side.       Dorsalis pedis pulses are 2+ on the right side, and 2+ on the left side.        Posterior tibial pulses are 2+ on the right side, and 2+ on the left side.   Pulmonary/Chest: Effort normal. He has no wheezes. He has no rhonchi. " He has no rales.   Abdominal: Soft. Normal appearance. He exhibits no distension. There is no hepatosplenomegaly. There is no tenderness.   Neurological: He is alert and oriented to person, place, and time. Gait normal.   Psychiatric: He has a normal mood and affect.         Assessment:       1. Dyslipidemia    2. Cardiomyopathy, ischemic    3. Essential hypertension    4. Coronary artery disease involving native coronary artery of native heart without angina pectoris    5. Uncontrolled type 2 diabetes mellitus without complication, without long-term current use of insulin         Plan:       1) CAD.  - multivessel disease not amenable to surgical revascularization; the patient is free of anginal and CHF symptoms  - continue plavix/ statin/ BB/imdur  -continue exercise routine    2) HLD  - continue statin; will check lipids and call patient with results  - avoid saturated fats(Ghee) and high cholesterol content foods     3) DM2 - poorly controlled with last HgbA1c of 9.5 on 2/23/17  - improve diet and compliance with meds  -check FS twice daily at home and maintain a log  - may need increased dose of sulfonylureas  -will refer to endocrinology     4) Ischemic CMP  - continue BB  - increase diovan to 160 mg daily       All of the patient's questions were answered.

## 2017-07-31 ENCOUNTER — TELEPHONE (OUTPATIENT)
Dept: CARDIOLOGY | Facility: CLINIC | Age: 57
End: 2017-07-31

## 2017-07-31 NOTE — TELEPHONE ENCOUNTER
Called patient with results of lipid panel. Left message with results and for patient to call about endocrinology appointment

## 2017-08-01 ENCOUNTER — PATIENT MESSAGE (OUTPATIENT)
Dept: RESEARCH | Facility: HOSPITAL | Age: 57
End: 2017-08-01

## 2017-08-21 ENCOUNTER — TELEPHONE (OUTPATIENT)
Dept: FAMILY MEDICINE | Facility: CLINIC | Age: 57
End: 2017-08-21

## 2017-08-21 ENCOUNTER — TELEPHONE (OUTPATIENT)
Dept: GASTROENTEROLOGY | Facility: CLINIC | Age: 57
End: 2017-08-21

## 2017-08-21 DIAGNOSIS — Z12.5 SCREENING FOR MALIGNANT NEOPLASM OF PROSTATE: ICD-10-CM

## 2017-08-21 DIAGNOSIS — I10 ESSENTIAL HYPERTENSION: ICD-10-CM

## 2017-08-21 NOTE — TELEPHONE ENCOUNTER
Orders Placed This Encounter   Procedures    CBC auto differential    Comprehensive metabolic panel    Hemoglobin A1c    Microalbumin/creatinine urine ratio    Lipid panel    TSH    PSA, Screening

## 2017-08-21 NOTE — TELEPHONE ENCOUNTER
----- Message from Patsy Walker sent at 8/21/2017 11:06 AM CDT -----  Contact: self, [phone]  Established patient requests to schedule an appointment, states he has stomach issues. Please advise.

## 2017-09-06 ENCOUNTER — LAB VISIT (OUTPATIENT)
Dept: LAB | Facility: HOSPITAL | Age: 57
End: 2017-09-06
Attending: FAMILY MEDICINE
Payer: MEDICAID

## 2017-09-06 DIAGNOSIS — I10 ESSENTIAL HYPERTENSION: ICD-10-CM

## 2017-09-06 DIAGNOSIS — Z12.5 SCREENING FOR MALIGNANT NEOPLASM OF PROSTATE: ICD-10-CM

## 2017-09-06 LAB
ALBUMIN SERPL BCP-MCNC: 3.5 G/DL
ALP SERPL-CCNC: 89 U/L
ALT SERPL W/O P-5'-P-CCNC: 17 U/L
ANION GAP SERPL CALC-SCNC: 6 MMOL/L
AST SERPL-CCNC: 13 U/L
BASOPHILS # BLD AUTO: 0.03 K/UL
BASOPHILS NFR BLD: 0.4 %
BILIRUB SERPL-MCNC: 0.6 MG/DL
BUN SERPL-MCNC: 9 MG/DL
CALCIUM SERPL-MCNC: 9 MG/DL
CHLORIDE SERPL-SCNC: 102 MMOL/L
CHOLEST SERPL-MCNC: 118 MG/DL
CHOLEST/HDLC SERPL: 3.8 {RATIO}
CO2 SERPL-SCNC: 26 MMOL/L
COMPLEXED PSA SERPL-MCNC: 0.68 NG/ML
CREAT SERPL-MCNC: 0.9 MG/DL
DIFFERENTIAL METHOD: ABNORMAL
EOSINOPHIL # BLD AUTO: 0.3 K/UL
EOSINOPHIL NFR BLD: 3.6 %
ERYTHROCYTE [DISTWIDTH] IN BLOOD BY AUTOMATED COUNT: 12.9 %
EST. GFR  (AFRICAN AMERICAN): >60 ML/MIN/1.73 M^2
EST. GFR  (NON AFRICAN AMERICAN): >60 ML/MIN/1.73 M^2
ESTIMATED AVG GLUCOSE: 220 MG/DL
GLUCOSE SERPL-MCNC: 155 MG/DL
HBA1C MFR BLD HPLC: 9.3 %
HCT VFR BLD AUTO: 34.3 %
HDLC SERPL-MCNC: 31 MG/DL
HDLC SERPL: 26.3 %
HGB BLD-MCNC: 11.1 G/DL
LDLC SERPL CALC-MCNC: 56 MG/DL
LYMPHOCYTES # BLD AUTO: 2.5 K/UL
LYMPHOCYTES NFR BLD: 30.7 %
MCH RBC QN AUTO: 26 PG
MCHC RBC AUTO-ENTMCNC: 32.4 G/DL
MCV RBC AUTO: 80 FL
MONOCYTES # BLD AUTO: 0.7 K/UL
MONOCYTES NFR BLD: 8 %
NEUTROPHILS # BLD AUTO: 4.7 K/UL
NEUTROPHILS NFR BLD: 57.1 %
NONHDLC SERPL-MCNC: 87 MG/DL
PLATELET # BLD AUTO: 235 K/UL
PMV BLD AUTO: 10.6 FL
POTASSIUM SERPL-SCNC: 4.4 MMOL/L
PROT SERPL-MCNC: 7.3 G/DL
RBC # BLD AUTO: 4.27 M/UL
SODIUM SERPL-SCNC: 134 MMOL/L
TRIGL SERPL-MCNC: 155 MG/DL
TSH SERPL DL<=0.005 MIU/L-ACNC: 3.12 UIU/ML
WBC # BLD AUTO: 8.27 K/UL

## 2017-09-06 PROCEDURE — 36415 COLL VENOUS BLD VENIPUNCTURE: CPT

## 2017-09-06 PROCEDURE — 84153 ASSAY OF PSA TOTAL: CPT

## 2017-09-06 PROCEDURE — 80061 LIPID PANEL: CPT

## 2017-09-06 PROCEDURE — 84443 ASSAY THYROID STIM HORMONE: CPT

## 2017-09-06 PROCEDURE — 85025 COMPLETE CBC W/AUTO DIFF WBC: CPT

## 2017-09-06 PROCEDURE — 80053 COMPREHEN METABOLIC PANEL: CPT

## 2017-09-06 PROCEDURE — 83036 HEMOGLOBIN GLYCOSYLATED A1C: CPT

## 2017-09-13 ENCOUNTER — OFFICE VISIT (OUTPATIENT)
Dept: FAMILY MEDICINE | Facility: CLINIC | Age: 57
End: 2017-09-13
Payer: MEDICAID

## 2017-09-13 VITALS
DIASTOLIC BLOOD PRESSURE: 66 MMHG | OXYGEN SATURATION: 97 % | WEIGHT: 156.75 LBS | BODY MASS INDEX: 25.19 KG/M2 | HEIGHT: 66 IN | SYSTOLIC BLOOD PRESSURE: 119 MMHG | HEART RATE: 94 BPM

## 2017-09-13 DIAGNOSIS — I10 ESSENTIAL HYPERTENSION: ICD-10-CM

## 2017-09-13 DIAGNOSIS — I25.10 CORONARY ARTERY DISEASE, ANGINA PRESENCE UNSPECIFIED, UNSPECIFIED VESSEL OR LESION TYPE, UNSPECIFIED WHETHER NATIVE OR TRANSPLANTED HEART: ICD-10-CM

## 2017-09-13 DIAGNOSIS — Z11.59 NEED FOR HEPATITIS C SCREENING TEST: ICD-10-CM

## 2017-09-13 DIAGNOSIS — K51.919 ULCERATIVE COLITIS WITH COMPLICATION, UNSPECIFIED LOCATION: ICD-10-CM

## 2017-09-13 PROCEDURE — 99396 PREV VISIT EST AGE 40-64: CPT | Mod: S$PBB,,, | Performed by: FAMILY MEDICINE

## 2017-09-13 PROCEDURE — 99213 OFFICE O/P EST LOW 20 MIN: CPT | Mod: PBBFAC,PO | Performed by: FAMILY MEDICINE

## 2017-09-13 PROCEDURE — 99999 PR PBB SHADOW E&M-EST. PATIENT-LVL III: CPT | Mod: PBBFAC,,, | Performed by: FAMILY MEDICINE

## 2017-09-13 NOTE — PROGRESS NOTES
(Portions of this note were dictated using voice recognition software and may contain dictation related errors in spelling/grammar/syntax not found on text review)    CC:   Chief Complaint   Patient presents with    Annual Exam       HPI: 57 y.o. male lov with me 11/15/16     Diabetes, on metforminXR 500 2 pills twice a day. glipizide 10 mg twice a day  Currently mostly compliant with metformin but sometimes just has to take 2 pills during the day because of diarrhea even with extended release formulation.  Blood sugars usually 150-180 in the morning.  No hypoglycemic episodes at this time    CAD:  Presentation march 2017 with angina. CAD on angio, no revasc ability. Medically mgd with statin with lipitor 80 mg daily, plavix, metoprolol suc 25 daily, imdur 30 mg daily    Does not take aspirin because of ALLERGY.    eye doctor the next 1-2 months.  Sees a dentist regularly.  No neuropathy symptoms     Hypertension on lisinopril 10 mg daily plus metoprolol 25 mg daily      Ulcerative colitis, followed by GI.  On mesalamine and omeprazole     Not much exercise, works late    Past Medical History:   Diagnosis Date    Coronary artery disease involving native coronary artery of native heart without angina pectoris 7/29/2017    Diabetes mellitus, type II     HTN (hypertension)     Ulcerative colitis        Past Surgical History:   Procedure Laterality Date    CATARACT EXTRACTION Bilateral 2013    COLONOSCOPY N/A 1/22/2016    Procedure: COLONOSCOPY;  Surgeon: Aristeo Lynn Jr., MD;  Location: [facility];  Service: Endoscopy;  Laterality: N/A;       Family History   Problem Relation Age of Onset    Coronary artery disease Mother 75    Coronary artery disease Father 55    Coronary artery disease Brother 59    Diabetes Brother     Prostate cancer Neg Hx     Colon cancer Neg Hx        Social History     Social History    Marital status:      Spouse name: N/A    Number of children: N/A    Years of  education: N/A     Occupational History    Not on file.     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     Age/Gender Appropriate screenings:  Colonoscopy in 2016, polyps removed, scattered pseudopolyps, rtc 5 yrs.  Prostate screening done January 2015 by his prior PCP    Lab Results   Component Value Date    WBC 8.27 09/06/2017    HGB 11.1 (L) 09/06/2017    HCT 34.3 (L) 09/06/2017     09/06/2017    CHOL 118 (L) 09/06/2017    TRIG 155 (H) 09/06/2017    HDL 31 (L) 09/06/2017    ALT 17 09/06/2017    AST 13 09/06/2017     (L) 09/06/2017    K 4.4 09/06/2017     09/06/2017    CREATININE 0.9 09/06/2017    BUN 9 09/06/2017    CO2 26 09/06/2017    TSH 3.124 09/06/2017    PSA 0.68 09/06/2017    INR 1.1 03/09/2017    HGBA1C 9.3 (H) 09/06/2017    LDLCALC 56.0 (L) 09/06/2017     (H) 09/06/2017     Hemoglobin A1C   Date Value Ref Range Status   09/06/2017 9.3 (H) 4.0 - 5.6 % Final     Comment:     According to ADA guidelines, hemoglobin A1c <7.0% represents  optimal control in non-pregnant diabetic patients. Different  metrics may apply to specific patient populations.   Standards of Medical Care in Diabetes-2016.  For the purpose of screening for the presence of diabetes:  <5.7%     Consistent with the absence of diabetes  5.7-6.4%  Consistent with increasing risk for diabetes   (prediabetes)  >or=6.5%  Consistent with diabetes  Currently, no consensus exists for use of hemoglobin A1c  for diagnosis of diabetes for children.  This Hemoglobin A1c assay has significant interference with fetal    hemoglobin   (HbF). The results are invalid for patients with abnormal amounts of   HbF,   including those with known Hereditary Persistence   of Fetal Hemoglobin. Heterozygous hemoglobin variants (HbAS, HbAC,   HbAD, HbAE, HbA2) do not significantly interfere with this assay;   however, presence of multiple variants in a sample may impact the %   interference.     02/23/2017 9.5 (H) 4.5 - 6.2 % Final     Comment:     According to ADA guidelines, hemoglobin A1C <7.0% represents  optimal control in non-pregnant diabetic patients.  Different  metrics may apply to specific populations.   Standards of Medical Care in Diabetes - 2016.  For the purpose of screening for the presence of diabetes:  <5.7%     Consistent with the absence of diabetes  5.7-6.4%  Consistent with increasing risk for diabetes   (prediabetes)  >or=6.5%  Consistent with diabetes  Currently no consensus exists for use of hemoglobin A1C  for diagnosis of diabetes for children.     11/15/2016 12.2 (H) 4.5 - 6.2 % Final     Comment:     According to ADA guidelines, hemoglobin A1C <7.0% represents  optimal control in non-pregnant diabetic patients.  Different  metrics may apply to specific populations.   Standards of Medical Care in Diabetes - 2016.  For the purpose of screening for the presence of diabetes:  <5.7%     Consistent with the absence of diabetes  5.7-6.4%  Consistent with increasing risk for diabetes   (prediabetes)  >or=6.5%  Consistent with diabetes  Currently no consensus exists for use of hemoglobin A1C  for diagnosis of diabetes for children.     12/11/2015 8.7 (H) 4.5 - 6.2 % Final        Normal [facility] ratio        ROS:  GENERAL: No fever, chills, fatigability or weight loss.  SKIN: No rashes, no itching.  HEAD: No headaches.  EYES: No visual changes  EARS: No ear pain or changes in hearing.  NOSE: No congestion or rhinorrhea.  MOUTH & THROAT: No hoarseness, change in voice, or sore throat.  NODES: Denies swollen glands.  CHEST: Denies  DENNIS, cyanosis, wheezing, cough and sputum production.  CARDIOVASCULAR: Denies chest pain, PND, orthopnea.  ABDOMEN: No nausea, vomiting, or changes in bowel function.  URINARY: No flank pain, dysuria or hematuria.  PERIPHERAL VASCULAR: No claudication or cyanosis.  MUSCULOSKELETAL: No joint stiffness or swelling. Denies back pain.  NEUROLOGIC: No weakness or numbness.    Vital signs reviewed  Wt Readings from Last 3 Encounters:   07/27/17 70.3 kg (154 lb 15.7 oz)   03/20/17 71.4 kg (157 lb 6.5 oz)   03/16/17 72.9 kg (160 lb 11.5 oz)     Temp Readings from Last 3 Encounters:   03/20/17 97.9 °F (36.6 °C) (Oral)   03/09/17 98 °F (36.7 °C) (Oral)   02/23/17 96.9 °F (36.1 °C) (Oral)     BP Readings from Last 3 Encounters:   07/27/17 122/73   03/20/17 138/82   03/16/17 130/75     Pulse Readings from Last 3 Encounters:   07/27/17 70   03/20/17 74   03/16/17 75             PE:   APPEARANCE: Well nourished, well developed, in no acute distress.    HEAD: Normocephalic, atraumatic.  EYES: PERRL. EOMI.   Conjunctivae noninjected.  EARS: TM's intact. Light reflex normal. No retraction or perforation  NOSE: Mucosa pink. Airway clear.  MOUTH & THROAT: No tonsillar enlargement. No pharyngeal erythema or exudate.   NECK: Supple with no cervical lymphadenopathy.    CHEST: Good inspiratory effort. Lungs clear to auscultation with no wheezes or crackles.  CARDIOVASCULAR: Normal S1, S2. No rubs, murmurs, or gallops.  ABDOMEN: Bowel sounds normal. Not distended. Soft. No tenderness or masses. No organomegaly.  EXTREMITIES: No edema, cyanosis, or clubbing.  PROSTATE: Smooth, symmetric, non-enlarged, nontender  DIABETIC FOOT EXAM: Protective Sensation (w/ 10 gram monofilament):  Right: Intact  Left: Intact    Visual Inspection:  Normal -  Bilateral    Pedal Pulses:   Right: Present  Left: Present    Posterior tibialis:   Right:Present  Left: Present    IMPRESSION  1. Uncontrolled type 2 diabetes mellitus without complication, without  long-term current use of insulin    2. Essential hypertension    3. Ulcerative colitis with complication, unspecified location    4. Need for hepatitis C screening test    5. Coronary artery disease, angina presence unspecified, unspecified vessel or lesion type, unspecified whether native or transplanted heart            PLAN  Diabetes health maintenance  -advise proper dietary and exercise modification  -advise medication compliance  -advise daily aspirin if there are no other contraindications  -advise consistent blood sugar monitoring  -advise regular dentist and ophthalmology visits  -advise regular foot checks  -Medication management: Continue metformin extended release 500 mg, 2 pills twice a day, glipizide 10 mg twice a day.  Add Januvia 100 mg daily.  If no improvement with glycemic control, and have to upgrade insulin    Hypertension well-controlled    CAD: Stable on current therapy as above    Ulcerative colitis: Overall stable.  Colonoscopy up-to-date.  Mild anemia likely from this.    Prostate exam done today, normal.  PSA normal.    Health maintenance as above.  Eye exam up to date.  We will be getting hepatitis C screening at follow-up lab work as below    Return 3 months, about 1 week after labs below  Orders Placed This Encounter   Procedures    Comprehensive metabolic panel    Hemoglobin A1c    Lipid panel    Hepatitis C antibody

## 2017-12-05 ENCOUNTER — LAB VISIT (OUTPATIENT)
Dept: LAB | Facility: HOSPITAL | Age: 57
End: 2017-12-05
Attending: FAMILY MEDICINE
Payer: MEDICAID

## 2017-12-05 DIAGNOSIS — I10 ESSENTIAL HYPERTENSION: ICD-10-CM

## 2017-12-05 DIAGNOSIS — Z11.59 NEED FOR HEPATITIS C SCREENING TEST: ICD-10-CM

## 2017-12-05 LAB
ALBUMIN SERPL BCP-MCNC: 3.5 G/DL
ALP SERPL-CCNC: 80 U/L
ALT SERPL W/O P-5'-P-CCNC: 17 U/L
ANION GAP SERPL CALC-SCNC: 8 MMOL/L
AST SERPL-CCNC: 13 U/L
BILIRUB SERPL-MCNC: 0.8 MG/DL
BUN SERPL-MCNC: 9 MG/DL
CALCIUM SERPL-MCNC: 9 MG/DL
CHLORIDE SERPL-SCNC: 102 MMOL/L
CHOLEST SERPL-MCNC: 149 MG/DL
CHOLEST/HDLC SERPL: 4.3 {RATIO}
CO2 SERPL-SCNC: 25 MMOL/L
CREAT SERPL-MCNC: 0.8 MG/DL
EST. GFR  (AFRICAN AMERICAN): >60 ML/MIN/1.73 M^2
EST. GFR  (NON AFRICAN AMERICAN): >60 ML/MIN/1.73 M^2
ESTIMATED AVG GLUCOSE: 174 MG/DL
GLUCOSE SERPL-MCNC: 142 MG/DL
HBA1C MFR BLD HPLC: 7.7 %
HDLC SERPL-MCNC: 35 MG/DL
HDLC SERPL: 23.5 %
LDLC SERPL CALC-MCNC: 86.6 MG/DL
NONHDLC SERPL-MCNC: 114 MG/DL
POTASSIUM SERPL-SCNC: 4.1 MMOL/L
PROT SERPL-MCNC: 7.5 G/DL
SODIUM SERPL-SCNC: 135 MMOL/L
TRIGL SERPL-MCNC: 137 MG/DL

## 2017-12-05 PROCEDURE — 83036 HEMOGLOBIN GLYCOSYLATED A1C: CPT

## 2017-12-05 PROCEDURE — 80061 LIPID PANEL: CPT

## 2017-12-05 PROCEDURE — 80053 COMPREHEN METABOLIC PANEL: CPT

## 2017-12-05 PROCEDURE — 36415 COLL VENOUS BLD VENIPUNCTURE: CPT

## 2017-12-05 PROCEDURE — 86803 HEPATITIS C AB TEST: CPT

## 2017-12-06 LAB — HCV AB SERPL QL IA: NEGATIVE

## 2017-12-12 ENCOUNTER — OFFICE VISIT (OUTPATIENT)
Dept: FAMILY MEDICINE | Facility: CLINIC | Age: 57
End: 2017-12-12
Payer: MEDICAID

## 2017-12-12 VITALS
WEIGHT: 161.19 LBS | HEART RATE: 76 BPM | BODY MASS INDEX: 26.01 KG/M2 | DIASTOLIC BLOOD PRESSURE: 82 MMHG | TEMPERATURE: 98 F | SYSTOLIC BLOOD PRESSURE: 130 MMHG | OXYGEN SATURATION: 99 %

## 2017-12-12 DIAGNOSIS — I25.10 CORONARY ARTERY DISEASE, ANGINA PRESENCE UNSPECIFIED, UNSPECIFIED VESSEL OR LESION TYPE, UNSPECIFIED WHETHER NATIVE OR TRANSPLANTED HEART: ICD-10-CM

## 2017-12-12 DIAGNOSIS — Z23 NEEDS FLU SHOT: ICD-10-CM

## 2017-12-12 DIAGNOSIS — I10 ESSENTIAL HYPERTENSION: ICD-10-CM

## 2017-12-12 PROCEDURE — 99214 OFFICE O/P EST MOD 30 MIN: CPT | Mod: 25,S$PBB,, | Performed by: FAMILY MEDICINE

## 2017-12-12 PROCEDURE — 99213 OFFICE O/P EST LOW 20 MIN: CPT | Mod: PBBFAC,PO | Performed by: FAMILY MEDICINE

## 2017-12-12 PROCEDURE — 90471 IMMUNIZATION ADMIN: CPT | Mod: PBBFAC,PO

## 2017-12-12 PROCEDURE — 99999 PR PBB SHADOW E&M-EST. PATIENT-LVL III: CPT | Mod: PBBFAC,,, | Performed by: FAMILY MEDICINE

## 2017-12-12 NOTE — PROGRESS NOTES
(Portions of this note were dictated using voice recognition software and may contain dictation related errors in spelling/grammar/syntax not found on text review)    CC:   Chief Complaint   Patient presents with    Follow-up     3 month.  Due for flu       HPI: 57 y.o. male seen about 3 months ago for annual exam.  Here for diabetes follow-up     Diabetes, on metforminXR 500 2 pills (supposedly twice a day but he gets diarrhea if he takes it that way.  He is just been taking 2 pills in the afternoon). glipizide 10 mg twice a day  .  Januvia was added at 100 mg daily at last visit.  Recent A1c down to 7.7 from 9.3 prior. Morning bg from .  Tolerant of Januvia.  No hypoglycemia      Does not take aspirin because of ALLERGY.    eye exam up to date  Sees a dentist regularly.  No neuropathy symptoms    CAD:  Presentation march 2017 with angina. CAD on angio, no revasc ability. Medically mgd with statin with lipitor 80 mg daily, plavix, metoprolol suc 25 daily, imdur 30 mg daily     Hypertension on lisinopril 10 mg daily plus metoprolol 25 mg daily      Ulcerative colitis, followed by GI.  On mesalamine and omeprazole      Not much exercise, works late.  Trying to get better with exercise however    Past Medical History:   Diagnosis Date    Coronary artery disease involving native coronary artery of native heart without angina pectoris 7/29/2017    Diabetes mellitus, type II     HTN (hypertension)     Ulcerative colitis        Past Surgical History:   Procedure Laterality Date    CATARACT EXTRACTION Bilateral 2013    COLONOSCOPY N/A 1/22/2016    Procedure: COLONOSCOPY;  Surgeon: Aristeo Lynn Jr., MD;  Location: [facility];  Service: Endoscopy;  Laterality: N/A;       Family History   Problem Relation Age of Onset    Coronary artery disease Mother 75    Coronary artery disease Father 55    Coronary artery disease Brother 59    Diabetes Brother     Prostate cancer Neg Hx     Colon cancer Neg Hx         Social History     Social History    Marital status:      Spouse name: N/A    Number of children: N/A    Years of education: N/A     Occupational History    Not on file.     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   flu: today    Age/Gender Appropriate screenings:  Colonoscopy in 2016, polyps removed, scattered pseudopolyps, rtc 5 yrs.  Prostate screening done 9/2017       Lab Results   Component Value Date    WBC 8.27 09/06/2017    HGB 11.1 (L) 09/06/2017    HCT 34.3 (L) 09/06/2017     09/06/2017    CHOL 149 12/05/2017    TRIG 137 12/05/2017    HDL 35 (L) 12/05/2017    ALT 17 12/05/2017    AST 13 12/05/2017     (L) 12/05/2017    K 4.1 12/05/2017     12/05/2017    CREATININE 0.8 12/05/2017    CALCIUM 9.0 12/05/2017    BUN 9 12/05/2017    CO2 25 12/05/2017    TSH 3.124 09/06/2017    PSA 0.68 09/06/2017    INR 1.1 03/09/2017    HGBA1C 7.7 (H) 12/05/2017    LDLCALC 86.6 12/05/2017     (H) 12/05/2017           ROS:  GENERAL: No fever, chills, fatigability or weight loss.  SKIN: No rashes, no itching.  HEAD: No headaches.  EYES: No visual changes  EARS: No ear pain or changes in hearing.  NOSE: No congestion or rhinorrhea.  MOUTH & THROAT: No hoarseness, change in voice, or sore throat.  NODES: Denies swollen glands.  CHEST: Denies DENNIS, cyanosis, wheezing, cough and sputum production.  CARDIOVASCULAR: Denies chest pain, PND, orthopnea.  ABDOMEN: No nausea, vomiting, or changes in bowel function.  URINARY: No flank pain, dysuria or hematuria.  PERIPHERAL VASCULAR: No  claudication or cyanosis.  MUSCULOSKELETAL: No joint stiffness or swelling. Denies back pain.  NEUROLOGIC: No weakness or numbness.    Vital signs reviewed  PE:   APPEARANCE: Well nourished, well developed, in no acute distress.    HEAD: Normocephalic, atraumatic.  EYES: PERRL. EOMI.   Conjunctivae noninjected.  EARS: TM's intact. Light reflex normal. No retraction or perforation  NOSE: Mucosa pink. Airway clear.  MOUTH & THROAT: No tonsillar enlargement. No pharyngeal erythema or exudate.   NECK: Supple with no cervical lymphadenopathy.  No carotid bruits.  No thyromegaly  CHEST: Good inspiratory effort. Lungs clear to auscultation with no wheezes or crackles.  CARDIOVASCULAR: Normal S1, S2. No rubs, murmurs, or gallops.  ABDOMEN: Bowel sounds normal. Not distended. Soft. No tenderness or masses. No organomegaly.  EXTREMITIES: No edema, cyanosis, or clubbing.      IMPRESSION  1. Uncontrolled type 2 diabetes mellitus with other circulatory complication, without long-term current use of insulin    2. Essential hypertension    3. Coronary artery disease, angina presence unspecified, unspecified vessel or lesion type, unspecified whether native or transplanted heart            PLAN  Diabetes health maintenance  -advise proper dietary and exercise modification  -advise medication compliance  -advise daily aspirin if there are no other contraindications  -advise consistent blood sugar monitoring  -advise regular dentist and ophthalmology visits  -advise regular foot checks  -Medication management: Continue metformin extended release 500 mg, 2 pills in the morning.  Try to add one pill in the evening as long as he does not have any significant GI side effects with this.  Continue glipizide 10 TWICE a day.  Continues Januvia 100 mg daily.    HTN: controlled.  Continue current therapy    CAD: continue RF mod, improvement of DM, stabilization of lipids and HTN.    UC: continue GI f/u    HM: flu shot: Today    Return in about  3 months (around 3/12/2018).

## 2018-01-05 RX ORDER — PANTOPRAZOLE SODIUM 40 MG/1
TABLET, DELAYED RELEASE ORAL
Qty: 30 TABLET | Refills: 9 | Status: ON HOLD | OUTPATIENT
Start: 2018-01-05 | End: 2021-05-03 | Stop reason: CLARIF

## 2018-02-19 RX ORDER — CLOPIDOGREL BISULFATE 75 MG/1
75 TABLET ORAL DAILY
Qty: 90 TABLET | Refills: 4 | Status: SHIPPED | OUTPATIENT
Start: 2018-02-19 | End: 2019-02-21 | Stop reason: SDUPTHER

## 2018-02-28 RX ORDER — GLIPIZIDE 10 MG/1
TABLET ORAL
Qty: 60 TABLET | Refills: 11 | Status: SHIPPED | OUTPATIENT
Start: 2018-02-28 | End: 2019-03-18 | Stop reason: SDUPTHER

## 2018-03-01 RX ORDER — GLIPIZIDE 10 MG/1
10 TABLET ORAL 2 TIMES DAILY WITH MEALS
Qty: 60 TABLET | Refills: 11 | OUTPATIENT
Start: 2018-03-01

## 2018-03-20 DIAGNOSIS — I25.10 CORONARY ARTERY DISEASE INVOLVING NATIVE CORONARY ARTERY OF NATIVE HEART WITHOUT ANGINA PECTORIS: Primary | ICD-10-CM

## 2018-03-20 RX ORDER — VALSARTAN 160 MG/1
160 TABLET ORAL DAILY
Qty: 90 TABLET | Refills: 3 | Status: SHIPPED | OUTPATIENT
Start: 2018-03-20 | End: 2018-09-13

## 2018-03-29 RX ORDER — METOPROLOL SUCCINATE 25 MG/1
25 TABLET, EXTENDED RELEASE ORAL DAILY
Qty: 90 TABLET | Refills: 3 | Status: SHIPPED | OUTPATIENT
Start: 2018-03-29 | End: 2018-10-30 | Stop reason: SDUPTHER

## 2018-06-11 RX ORDER — METFORMIN HYDROCHLORIDE 500 MG/1
TABLET, EXTENDED RELEASE ORAL
Qty: 120 TABLET | Refills: 11 | Status: SHIPPED | OUTPATIENT
Start: 2018-06-11 | End: 2019-06-24 | Stop reason: SDUPTHER

## 2018-06-15 DIAGNOSIS — E11.9 TYPE 2 DIABETES MELLITUS WITHOUT COMPLICATION: ICD-10-CM

## 2018-07-18 ENCOUNTER — TELEPHONE (OUTPATIENT)
Dept: CARDIOLOGY | Facility: CLINIC | Age: 58
End: 2018-07-18

## 2018-09-13 ENCOUNTER — TELEPHONE (OUTPATIENT)
Dept: FAMILY MEDICINE | Facility: CLINIC | Age: 58
End: 2018-09-13

## 2018-09-13 ENCOUNTER — OFFICE VISIT (OUTPATIENT)
Dept: CARDIOLOGY | Facility: CLINIC | Age: 58
End: 2018-09-13
Payer: MEDICAID

## 2018-09-13 VITALS
DIASTOLIC BLOOD PRESSURE: 97 MMHG | HEIGHT: 66 IN | SYSTOLIC BLOOD PRESSURE: 175 MMHG | BODY MASS INDEX: 26.61 KG/M2 | OXYGEN SATURATION: 100 % | HEART RATE: 81 BPM | WEIGHT: 165.56 LBS

## 2018-09-13 DIAGNOSIS — I25.10 CORONARY ARTERY DISEASE INVOLVING NATIVE CORONARY ARTERY OF NATIVE HEART WITHOUT ANGINA PECTORIS: ICD-10-CM

## 2018-09-13 DIAGNOSIS — R07.9 CHEST PAIN, UNSPECIFIED TYPE: Primary | ICD-10-CM

## 2018-09-13 DIAGNOSIS — I25.10 CORONARY ARTERY DISEASE INVOLVING NATIVE HEART, ANGINA PRESENCE UNSPECIFIED, UNSPECIFIED VESSEL OR LESION TYPE: ICD-10-CM

## 2018-09-13 DIAGNOSIS — I10 ESSENTIAL HYPERTENSION: ICD-10-CM

## 2018-09-13 DIAGNOSIS — I25.5 CARDIOMYOPATHY, ISCHEMIC: ICD-10-CM

## 2018-09-13 PROCEDURE — 99999 PR PBB SHADOW E&M-EST. PATIENT-LVL III: CPT | Mod: PBBFAC,,, | Performed by: INTERNAL MEDICINE

## 2018-09-13 PROCEDURE — 99213 OFFICE O/P EST LOW 20 MIN: CPT | Mod: PBBFAC | Performed by: INTERNAL MEDICINE

## 2018-09-13 PROCEDURE — 99213 OFFICE O/P EST LOW 20 MIN: CPT | Mod: S$PBB,,, | Performed by: INTERNAL MEDICINE

## 2018-09-13 RX ORDER — LOSARTAN POTASSIUM 100 MG/1
100 TABLET ORAL DAILY
Qty: 90 TABLET | Refills: 3 | Status: SHIPPED | OUTPATIENT
Start: 2018-09-13 | End: 2019-09-11 | Stop reason: SDUPTHER

## 2018-09-13 NOTE — TELEPHONE ENCOUNTER
----- Message from Awilda Whiting sent at 9/13/2018  3:29 PM CDT -----  Contact: [phone]  Patient is requesting to see the doctor again, it will not allow me to schedule the appt because of the medicaid. Please call.

## 2018-09-14 ENCOUNTER — TELEPHONE (OUTPATIENT)
Dept: CARDIOLOGY | Facility: CLINIC | Age: 58
End: 2018-09-14

## 2018-09-14 NOTE — TELEPHONE ENCOUNTER
----- Message from Jen Blanc sent at 9/14/2018 11:29 AM CDT -----  Contact: self/[phone]  Patient called again to check status of appointment request from yesterday.      Please call and advise.

## 2018-09-15 NOTE — PROGRESS NOTES
Subjective:    Patient ID:  Michoacano Lind is a 58 y.o. male who presents for follow-up of Coronary Artery Disease    HPI  Michoacano Lind is a 58 y.o. male with diabetes mellitus type 2 and HTN who was admitted to an [facility] with angina in 2017  and a subsequent coronary angiogram revealed multivessel CAD and reduced LVEF. He was recommended for surgery but CTS, but Dr. Espitia's note states that Dr. Disla, CTS  reviewed his angiograms and determined that he did not have good targets for revascularization. At that time he transferred his cardiology care to [facility].  He was last seen in this clinic on 17. In the interim since his last clinic visit he reports an episode of chest pressure while moving furniture. In the interim between his last clinic visit and the clinic visit prior to that one, he had not experienced angina. He denies LE edema, orthopnea, or PND. He denies palpitations syncope or near syncope.The patient has been exercising 4-5 days a week by walking for 40 minutes.  He denies angina during his walks.  He reports good medication compliance and adherence to a heart healthy, vegetarian diet.  Of note, his father had a history of a MI at age 55 which he  from. His brother was diagnosed with  CAD at age of 59. He is a former smoker (quit many years ago).      Past Medical History:   Diagnosis Date    Coronary artery disease involving native coronary artery of native heart without angina pectoris 2017    Diabetes mellitus, type II     HTN (hypertension)     Ulcerative colitis      Past Surgical History:   Procedure Laterality Date    CATARACT EXTRACTION Bilateral     COLONOSCOPY N/A 2016    Procedure: COLONOSCOPY;  Surgeon: Aristeo Lynn Jr., MD;  Location: [facility] ENDO;  Service: Endoscopy;  Laterality: N/A;    COLONOSCOPY N/A 2016    Performed by Aristeo Lynn Jr., MD at [facility] ENDO     Current Outpatient Medications on File Prior to Visit    Medication Sig Dispense Refill    APRISO 0.375 gram Cp24 TAKE FOUR CAPSULES BY MOUTH ONCE DAILY 120 capsule 6    atorvastatin (LIPITOR) 80 MG tablet Take 1 tablet (80 mg total) by mouth once daily. 90 tablet 3    blood sugar diagnostic (BLOOD GLUCOSE TEST) Strp Dispense 1 meter and 100 test strips (insurance covered brand). Test sugar daily 100 strip 11    clopidogrel (PLAVIX) 75 mg tablet Take 1 tablet (75 mg total) by mouth once daily. 90 tablet 4    glipiZIDE (GLUCOTROL) 10 MG tablet TAKE ONE TABLET BY MOUTH TWICE DAILY WITH MEALS 60 tablet 11    metFORMIN (GLUCOPHAGE-XR) 500 MG 24 hr tablet TAKE TWO TABLETS BY MOUTH TWICE DAILY WITH  MEALS 120 tablet 11    metoprolol succinate (TOPROL-XL) 25 MG 24 hr tablet Take 1 tablet (25 mg total) by mouth once daily. 90 tablet 3    nitroGLYCERIN (NITROSTAT) 0.4 MG SL tablet Place 1 tablet (0.4 mg total) under the tongue every 5 (five) minutes as needed for Chest pain. 30 tablet 1    pantoprazole (PROTONIX) 40 MG tablet TAKE ONE TABLET BY MOUTH ONCE DAILY 30 tablet 9    SITagliptin (JANUVIA) 100 MG Tab Take 1 tablet (100 mg total) by mouth once daily. 30 tablet 11     No current facility-administered medications on file prior to visit.      Review of patient's allergies indicates:   Allergen Reactions    Aspirin     Lisinopril Other (See Comments)     Cough 2/2 aceI     Social History     Tobacco Use    Smoking status: Former Smoker     Last attempt to quit: 3/16/2008     Years since quitting: 10.5    Smokeless tobacco: Former User   Substance Use Topics    Alcohol use: Yes     Alcohol/week: 0.0 oz     Comment: occasionally    Drug use: No     Family History   Problem Relation Age of Onset    Coronary artery disease Mother 75    Coronary artery disease Father 55    Coronary artery disease Brother 59    Diabetes Brother     Prostate cancer Neg Hx     Colon cancer Neg Hx        Review of Systems   Constitution: Negative for decreased appetite,  "diaphoresis, fever, malaise/fatigue, weight gain and weight loss.   HENT: Negative for congestion, nosebleeds and sore throat.    Eyes: Negative for blurred vision, vision loss in left eye, vision loss in right eye and visual disturbance.   Cardiovascular: Positive for chest pain. Negative for claudication, dyspnea on exertion, leg swelling, near-syncope, orthopnea, palpitations, paroxysmal nocturnal dyspnea and syncope.   Respiratory: Negative for cough, hemoptysis, shortness of breath and wheezing.    Endocrine: Negative for polyuria.   Hematologic/Lymphatic: Does not bruise/bleed easily.   Skin: Negative for nail changes and rash.   Musculoskeletal: Negative for back pain, muscle cramps and myalgias.   Gastrointestinal: Negative for abdominal pain, change in bowel habit, diarrhea, heartburn, hematemesis, hematochezia, melena, nausea and vomiting.   Genitourinary: Negative for bladder incontinence, dysuria, frequency and hematuria.   Psychiatric/Behavioral: Negative for depression.   Allergic/Immunologic: Negative for hives.        Objective:  Vitals:    09/13/18 0954 09/13/18 1000   BP: (!) 170/78 (!) 175/97   BP Location: Left arm Right arm   Patient Position: Sitting Sitting   BP Method: Large (Automatic) Large (Automatic)   Pulse: 81    SpO2: 100%    Weight: 75.1 kg (165 lb 9.1 oz)    Height: 5' 6" (1.676 m)          Physical Exam   Constitutional: He is oriented to person, place, and time. He appears well-developed and well-nourished.   HENT:   Head: Normocephalic and atraumatic.   Eyes: EOM are normal. Pupils are equal, round, and reactive to light.   Neck: Neck supple. No JVD present. No thyromegaly present.   Cardiovascular: Normal rate, regular rhythm and normal heart sounds. PMI is displaced. Exam reveals no gallop and no friction rub.   No murmur heard.  Pulses:       Carotid pulses are 2+ on the right side, and 2+ on the left side.       Radial pulses are 2+ on the right side, and 2+ on the left side. "        Femoral pulses are 2+ on the right side, and 2+ on the left side.       Dorsalis pedis pulses are 2+ on the right side, and 2+ on the left side.        Posterior tibial pulses are 2+ on the right side, and 2+ on the left side.   Pulmonary/Chest: Effort normal and breath sounds normal. He has no wheezes. He has no rhonchi. He has no rales.   Abdominal: Soft. Normal appearance and bowel sounds are normal. He exhibits no distension. There is no hepatosplenomegaly. There is no tenderness.   Musculoskeletal: He exhibits no edema.   Neurological: He is alert and oriented to person, place, and time. Gait normal.   Skin: Skin is warm and dry. No erythema.   Psychiatric: He has a normal mood and affect.         Assessment:       1. Chest pain, unspecified type    2. Coronary artery disease involving native heart, angina presence unspecified, unspecified vessel or lesion type    3. Uncontrolled type 2 diabetes mellitus without complication, without long-term current use of insulin    4. Essential hypertension    5. Cardiomyopathy, ischemic    6. Coronary artery disease involving native coronary artery of native heart without angina pectoris         Plan:       1) CAD.  - multivessel disease not amenable to surgical revascularization; the patient is free of CHF symptoms, but experienced exertioanl chest pain while moving furniture  -will order exercise SPECT to evaluate for ischemia 3/27/17 exercise SPECT did not demonstrate ischemia  -continue plavix/ statin/ BB/imdur  -continue exercise routine if stress test pool not demonstrate ischemia     2) HLD.  12/5/17 lipid panel reviewed  - continue statin;   - avoid saturated fats(Ghee) and high cholesterol content foods     3) DM2 - HgbA1c was 7.7 on 12/5/17 from 9.3 on 9/6/17  - agree with tight glycemic control     4) Ischemic CMP  2/23/17 TTE demonstrated LVEF=40-45%  -patient is euvolemic by exam with NYHA class I symptoms  - continue BB  - will D/C valsartan and start  losartan 100mg po qday    5) HTN. Blood pressure elevated in clinic today, but patient needs replacement for valsartan; will start losartan 100mg po qday as above    All of the patient's questions were answered.

## 2018-09-17 ENCOUNTER — CLINICAL SUPPORT (OUTPATIENT)
Dept: CARDIOLOGY | Facility: CLINIC | Age: 58
End: 2018-09-17
Attending: INTERNAL MEDICINE
Payer: MEDICAID

## 2018-09-17 DIAGNOSIS — I25.10 CORONARY ARTERY DISEASE INVOLVING NATIVE HEART, ANGINA PRESENCE UNSPECIFIED, UNSPECIFIED VESSEL OR LESION TYPE: ICD-10-CM

## 2018-09-17 DIAGNOSIS — R07.9 CHEST PAIN, UNSPECIFIED TYPE: ICD-10-CM

## 2018-09-17 LAB — DIASTOLIC DYSFUNCTION: NO

## 2018-09-17 PROCEDURE — 93018 CV STRESS TEST I&R ONLY: CPT | Mod: S$PBB,,, | Performed by: INTERNAL MEDICINE

## 2018-09-17 PROCEDURE — 78452 HT MUSCLE IMAGE SPECT MULT: CPT | Mod: PBBFAC | Performed by: INTERNAL MEDICINE

## 2018-09-17 PROCEDURE — 93016 CV STRESS TEST SUPVJ ONLY: CPT | Mod: S$PBB,,, | Performed by: INTERNAL MEDICINE

## 2018-09-20 ENCOUNTER — TELEPHONE (OUTPATIENT)
Dept: CARDIOLOGY | Facility: CLINIC | Age: 58
End: 2018-09-20

## 2018-09-28 RX ORDER — SITAGLIPTIN 100 MG/1
TABLET, FILM COATED ORAL
Qty: 30 TABLET | Refills: 11 | Status: SHIPPED | OUTPATIENT
Start: 2018-09-28 | End: 2019-08-02

## 2018-10-02 ENCOUNTER — TELEPHONE (OUTPATIENT)
Dept: FAMILY MEDICINE | Facility: CLINIC | Age: 58
End: 2018-10-02

## 2018-10-02 RX ORDER — DEXTROSE 4 G
TABLET,CHEWABLE ORAL
Qty: 1 EACH | Refills: 0 | Status: SHIPPED | OUTPATIENT
Start: 2018-10-02 | End: 2023-01-27

## 2018-10-02 NOTE — TELEPHONE ENCOUNTER
----- Message from Awilda Whiting sent at 10/2/2018  2:03 PM CDT -----  Contact: lia from [facility] pharmacy [phone]  Pharmacist advised they need two different Rx's for the blood sugar diagnostic (BLOOD GLUCOSE TEST) Strp and Meter. Please call.    Directions Dispense 1 meter and 100 test strips (insurance covered brand).

## 2018-10-29 ENCOUNTER — TELEPHONE (OUTPATIENT)
Dept: ADMINISTRATIVE | Facility: HOSPITAL | Age: 58
End: 2018-10-29

## 2018-10-30 ENCOUNTER — OFFICE VISIT (OUTPATIENT)
Dept: FAMILY MEDICINE | Facility: CLINIC | Age: 58
End: 2018-10-30
Payer: MEDICAID

## 2018-10-30 VITALS
DIASTOLIC BLOOD PRESSURE: 80 MMHG | HEIGHT: 66 IN | BODY MASS INDEX: 26.93 KG/M2 | SYSTOLIC BLOOD PRESSURE: 150 MMHG | OXYGEN SATURATION: 99 % | WEIGHT: 167.56 LBS | TEMPERATURE: 98 F | HEART RATE: 73 BPM

## 2018-10-30 DIAGNOSIS — I10 ESSENTIAL HYPERTENSION: Primary | ICD-10-CM

## 2018-10-30 DIAGNOSIS — Z23 FLU VACCINE NEED: ICD-10-CM

## 2018-10-30 DIAGNOSIS — I25.10 CORONARY ARTERY DISEASE, ANGINA PRESENCE UNSPECIFIED, UNSPECIFIED VESSEL OR LESION TYPE, UNSPECIFIED WHETHER NATIVE OR TRANSPLANTED HEART: ICD-10-CM

## 2018-10-30 DIAGNOSIS — K51.919 ULCERATIVE COLITIS WITH COMPLICATION, UNSPECIFIED LOCATION: ICD-10-CM

## 2018-10-30 PROCEDURE — 99396 PREV VISIT EST AGE 40-64: CPT | Mod: 25,S$PBB,, | Performed by: FAMILY MEDICINE

## 2018-10-30 PROCEDURE — 99213 OFFICE O/P EST LOW 20 MIN: CPT | Mod: PBBFAC,PO | Performed by: FAMILY MEDICINE

## 2018-10-30 PROCEDURE — 99999 PR PBB SHADOW E&M-EST. PATIENT-LVL III: CPT | Mod: PBBFAC,,, | Performed by: FAMILY MEDICINE

## 2018-10-30 PROCEDURE — 90471 IMMUNIZATION ADMIN: CPT | Mod: PBBFAC,PO

## 2018-10-30 RX ORDER — VALSARTAN 160 MG/1
TABLET ORAL
COMMUNITY
Start: 2018-09-13 | End: 2018-10-30

## 2018-10-30 RX ORDER — ATORVASTATIN CALCIUM 80 MG/1
80 TABLET, FILM COATED ORAL DAILY
Qty: 90 TABLET | Refills: 3 | Status: SHIPPED | OUTPATIENT
Start: 2018-10-30 | End: 2019-11-08 | Stop reason: SDUPTHER

## 2018-10-30 RX ORDER — METOPROLOL SUCCINATE 50 MG/1
50 TABLET, EXTENDED RELEASE ORAL DAILY
Qty: 90 TABLET | Refills: 3 | Status: SHIPPED | OUTPATIENT
Start: 2018-10-30 | End: 2019-10-29 | Stop reason: SDUPTHER

## 2018-10-30 NOTE — PROGRESS NOTES
(Portions of this note were dictated using voice recognition software and may contain dictation related errors in spelling/grammar/syntax not found on text review)    CC:   Chief Complaint   Patient presents with    Annual Exam    Medication Refill     atorvastatin, b/g strips    Diabetic Foot Exam       HPI: 58 y.o. male     Diabetes, on metforminXR 500 2 pills am/1 pill pm.(diarrhea if full 2000 mg daily). glipizide 10 mg twice a day , Januvia  100 mg daily   last A1c down to 7.7 from 9.3 prior. BG am--100-175 (rarely that high). Healthy diet.       Does not take aspirin because of ALLERGY.    eye exam : 5/2018  Sees a dentist regularly.    No neuropathy symptoms     CAD:  Presentation march 2017 with angina. CAD on angio, no revasc ability. Medically mgd with statin with lipitor 80 mg daily (needs refill), plavix, metoprolol suc 25 daily, losartan 100 mg daily, imdur 30 mg daily. Had CP few months ago. Had a nuclear stress test done in September 2018, EKG portion was positive, the however LV EF of 54%, imaging showed no significant myocardial ischemia or injury     Hypertension on losartan 100 mg daily plus metoprolol 25 mg daily      Ulcerative colitis, prior followed by GI. Currently not taking mesalamine (asymptomatic at this time)    Omeprazole PRN for GERD sx    Occasional lower abdominal pain when lifting objects.  No bulging.  Symptoms are just sporadic.  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    Past Medical History:   Diagnosis Date    Coronary artery disease involving native coronary artery of native heart without angina pectoris 7/29/2017    Diabetes mellitus, type II     HTN (hypertension)     Ulcerative colitis        Past Surgical History:   Procedure Laterality Date    CATARACT EXTRACTION Bilateral 2013    COLONOSCOPY N/A 1/22/2016    Procedure: COLONOSCOPY;  Surgeon: Aristeo Lynn Jr., MD;  Location: [facility];  Service: Endoscopy;  Laterality:  N/A;    COLONOSCOPY N/A 1/22/2016    Performed by Aristeo Lynn Jr., MD at [facility] ENDO       Family History   Problem Relation Age of Onset    Coronary artery disease Mother 75    Coronary artery disease Father 55    Coronary artery disease Brother 59    Diabetes Brother     Prostate cancer Neg Hx     Colon cancer Neg Hx        Social History     Socioeconomic History    Marital status:      Spouse name: Not on file    Number of children: Not on file    Years of education: Not on file    Highest education level: Not on file   Social Needs    Financial resource strain: Not on file    Food insecurity - worry: Not on file    Food insecurity - inability: Not on file    Transportation needs - medical: Not on file    Transportation needs - non-medical: Not on file   Occupational History    Not on file   Tobacco Use    Smoking status: Former Smoker     Last attempt to quit: 3/16/2008     Years since quitting: 10.6    Smokeless tobacco: Former User   Substance and Sexual Activity    Alcohol use: Yes     Alcohol/week: 0.0 oz     Comment: occasionally    Drug use: No    Sexual activity: Yes   Other Topics Concern    Not on file   Social History Narrative    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     No recent exercise    Healthy diet  (veg)     Lab Results   Component Value Date    WBC 8.27 09/06/2017    HGB 11.1 (L) 09/06/2017    HCT 34.3 (L) 09/06/2017     09/06/2017    CHOL 149 12/05/2017    TRIG 137 12/05/2017    HDL 35 (L) 12/05/2017    ALT 17 12/05/2017    AST 13 12/05/2017     (L) 12/05/2017    K 4.1 12/05/2017     12/05/2017    CREATININE 0.8 12/05/2017    CALCIUM 9.0 12/05/2017    BUN 9 12/05/2017    CO2 25 12/05/2017    TSH 3.124 09/06/2017    PSA 0.68 09/06/2017    INR 1.1 03/09/2017    HGBA1C 7.7 (H) 12/05/2017    LDLCALC 86.6 12/05/2017     (H) 12/05/2017           ROS:  GENERAL: No fever, chills, fatigability or weight loss.  SKIN: No rashes, no itching.  HEAD: No  headaches.  EYES: No visual changes  EARS: No ear pain or changes in hearing.  NOSE: No congestion or rhinorrhea.  MOUTH & THROAT: No hoarseness, change in voice, or sore throat.  NODES: Denies swollen glands.  CHEST: Denies DENNIS, cyanosis, wheezing, cough and sputum production.  CARDIOVASCULAR: Denies chest pain, PND, orthopnea.  ABDOMEN:  Above.  URINARY: No flank pain, dysuria or hematuria.  PERIPHERAL VASCULAR: No claudication or cyanosis.  MUSCULOSKELETAL: No joint stiffness or swelling. Denies back pain.  NEUROLOGIC: No weakness or numbness.    Vital signs reviewed  PE:   APPEARANCE: Well nourished, well developed, in no acute distress.    HEAD: Normocephalic, atraumatic.  EYES: PERRL. EOMI.   Conjunctivae noninjected.  EARS: TM's intact. Light reflex normal. No retraction or perforation  NOSE: Mucosa pink. Airway clear.  MOUTH & THROAT: No tonsillar enlargement. No pharyngeal erythema or exudate.   NECK: Supple with no cervical lymphadenopathy.    CHEST: Good inspiratory effort. Lungs clear to auscultation with no wheezes or crackles.  CARDIOVASCULAR: Normal S1, S2. No rubs, murmurs, or gallops.  ABDOMEN: Bowel sounds normal. Not distended. Soft. No tenderness or masses. No organomegaly. No evidence of inguinal hernia bilaterally or ventral hernia  EXTREMITIES: No edema, cyanosis, or clubbing.  DIABETIC FOOT EXAM: Protective Sensation (w/ 10 gram monofilament):  Right: Intact  Left: Intact    Visual Inspection:  Normal -  Bilateral    Pedal Pulses:   Right: Present  Left: Present    Posterior tibialis:   Right:Present  Left: Present          IMPRESSION  1. Essential hypertension    2. Coronary artery disease, angina presence unspecified, unspecified vessel or lesion type, unspecified whether native or transplanted heart    3. Uncontrolled type 2 diabetes mellitus without complication, without long-term current use of insulin    4. Ulcerative colitis with complication, unspecified location             PLAN  Diabetes health maintenance:  -- advise regular and consistent glucose monitoring and medication compliance.  -- advise daily foot checks  -- advise yearly ophthalmologic exams  -- advise adequate dietary and exercise modification  -- advise regular dental visits  -- advise daily low-dose aspirin use if patient is not on other anticoagulants and there are no other contraindications.  -- Medication management:  Can continue metformin extended release, 1500 mg daily, glipizide 10 mg b.i.d., Januvia 100 mg daily    HTN:  Not controlled.  Increase metoprolol to 50 mg daily.  Continue losartan 100 mg daily    CAD.  Asymptomatic.  Review recent workup.  Would recommend continued maximization of risk factor control    Lower abdominal pain, possibly muscular strain.  No evidence in exam of  hernia.  Notify if symptoms worsen    Ulcerative colitis:  Not clinically active at this time    HEALTH SCREENINGS   Immunizations:  Pneumovax 2015  Tetanus around 2013   flu: today   zoster:  Can get at pharmacy    Age/Gender Appropriate screenings:  Colonoscopy in 2016, polyps removed, scattered pseudopolyps, rtc 5 yrs.  Prostate screening done 9/2017

## 2018-10-30 NOTE — LETTER
October 30, 2018      Other  [address]  [place]  [place] [address]           [address]  [place] [address]  Phone: [phone]  Fax: [phone]          Patient: Michoacano Lind   MR Number: [id number]   YOB: 1960   Date of Visit: 10/30/2018       Dear Other:    Thank you for referring Michoacano Lind to me for evaluation. Attached you will find relevant portions of my assessment and plan of care.    If you have questions, please do not hesitate to call me. I look forward to following Michoacano Lind along with you.    Sincerely,    Facundo Alexis MD    Enclosure  CC:  No Recipients    If you would like to receive this communication electronically, please contact [email or web address] or [phone] to request more information on [employer] access.    For providers and/or their staff who would like to refer a patient to [facility], please contact us through our one-stop-shop provider referral line, [facility] , at [phone].    If you feel you have received this communication in error or would no longer like to receive these types of communications, please e-mail [email or web address]

## 2018-10-30 NOTE — PATIENT INSTRUCTIONS
Look into getting the Shingles vaccine (SHINGRIX) at your local pharmacy (2 part series, done once at/after age 50)

## 2018-11-25 ENCOUNTER — PATIENT MESSAGE (OUTPATIENT)
Dept: FAMILY MEDICINE | Facility: CLINIC | Age: 58
End: 2018-11-25

## 2018-11-25 NOTE — TELEPHONE ENCOUNTER
Dear Michoacano Lind    Your recent labs were reviewed and released to your account. Your lab results show your diabetes test or a1c has increased to 8.4 up from 7.7. I do think we may need to make a change to your medicine. I was thinking about perhaps changing the Januvia and instead going with a medicine that works somewhat similarly but is a lot better (examples of medicines in this class are Trulicity or Victoza). The thing is that the new medicine is an injection, not a pill. The injection is very easy to do and most people do very well with it, and it is much more effective than Januvia. Please let me know if this is something that you would be willing to do, and I can then send a prescription to the pharmacy.     Facundo Alexis MD

## 2019-02-21 RX ORDER — CLOPIDOGREL BISULFATE 75 MG/1
75 TABLET ORAL DAILY
Qty: 90 TABLET | Refills: 4 | Status: SHIPPED | OUTPATIENT
Start: 2019-02-21 | End: 2021-02-15

## 2019-03-18 RX ORDER — GLIPIZIDE 10 MG/1
TABLET ORAL
Qty: 60 TABLET | Refills: 11 | Status: SHIPPED | OUTPATIENT
Start: 2019-03-18 | End: 2019-08-02

## 2019-03-22 DIAGNOSIS — E11.9 TYPE 2 DIABETES MELLITUS WITHOUT COMPLICATION: ICD-10-CM

## 2019-06-24 RX ORDER — METFORMIN HYDROCHLORIDE 500 MG/1
TABLET, EXTENDED RELEASE ORAL
Qty: 120 TABLET | Refills: 11 | Status: SHIPPED | OUTPATIENT
Start: 2019-06-24 | End: 2020-07-21

## 2019-07-01 ENCOUNTER — PATIENT MESSAGE (OUTPATIENT)
Dept: FAMILY MEDICINE | Facility: CLINIC | Age: 59
End: 2019-07-01

## 2019-08-02 ENCOUNTER — OFFICE VISIT (OUTPATIENT)
Dept: FAMILY MEDICINE | Facility: CLINIC | Age: 59
End: 2019-08-02
Payer: MEDICAID

## 2019-08-02 VITALS
HEIGHT: 66 IN | BODY MASS INDEX: 27 KG/M2 | WEIGHT: 168 LBS | DIASTOLIC BLOOD PRESSURE: 92 MMHG | HEART RATE: 78 BPM | SYSTOLIC BLOOD PRESSURE: 149 MMHG | TEMPERATURE: 98 F

## 2019-08-02 DIAGNOSIS — K51.919 ULCERATIVE COLITIS WITH COMPLICATION, UNSPECIFIED LOCATION: ICD-10-CM

## 2019-08-02 DIAGNOSIS — M77.01 MEDIAL EPICONDYLITIS OF ELBOW, RIGHT: ICD-10-CM

## 2019-08-02 DIAGNOSIS — I25.10 CORONARY ARTERY DISEASE, ANGINA PRESENCE UNSPECIFIED, UNSPECIFIED VESSEL OR LESION TYPE, UNSPECIFIED WHETHER NATIVE OR TRANSPLANTED HEART: ICD-10-CM

## 2019-08-02 DIAGNOSIS — Z12.5 ENCOUNTER FOR SCREENING FOR MALIGNANT NEOPLASM OF PROSTATE: ICD-10-CM

## 2019-08-02 DIAGNOSIS — I10 ESSENTIAL HYPERTENSION: ICD-10-CM

## 2019-08-02 PROCEDURE — 99999 PR PBB SHADOW E&M-EST. PATIENT-LVL III: CPT | Mod: PBBFAC,,, | Performed by: FAMILY MEDICINE

## 2019-08-02 PROCEDURE — 99214 OFFICE O/P EST MOD 30 MIN: CPT | Mod: S$PBB,,, | Performed by: FAMILY MEDICINE

## 2019-08-02 PROCEDURE — 99214 PR OFFICE/OUTPT VISIT, EST, LEVL IV, 30-39 MIN: ICD-10-PCS | Mod: S$PBB,,, | Performed by: FAMILY MEDICINE

## 2019-08-02 PROCEDURE — 99999 PR PBB SHADOW E&M-EST. PATIENT-LVL III: ICD-10-PCS | Mod: PBBFAC,,, | Performed by: FAMILY MEDICINE

## 2019-08-02 PROCEDURE — 99213 OFFICE O/P EST LOW 20 MIN: CPT | Mod: PBBFAC,PO | Performed by: FAMILY MEDICINE

## 2019-08-02 NOTE — PATIENT INSTRUCTIONS
"Check blood pressure for 2 weeks in the morning at home, send me results through [facility] portal.  Goal blood pressure <140/90    Lifestyle choices to lower blood pressure?    Diet?  DASH diet--high in fruits/vegetables and low in fat and saturated fat: 8-14 points?  Salt restriction--2.3 grams sodium/day: 2-8 points?    Weight loss--5-20 points per 20 lbs lost.?    Exercise--30min most days/wk: 4-9 points?    Limit Alcohol--2/day men; 1/day women: 2-4 points.      The DASH Diet: Healthy Eating to Control Your Blood Pressure     What is the DASH diet?    DASH stands for Dietary Approaches to Stop Hypertension. It is a balanced eating plan that your family doctor might recommend to help you lower your blood pressure. The DASH diet:  Is low in salt, saturated fat, cholesterol and total fat.   Emphasizes fruits, vegetables, and fat-free or low-fat milk and milk products.   Includes whole grains, fish, poultry and nuts.   Limits the amount of red meat, sweets, added sugars and sugar-containing beverages in your diet.   Is rich in potassium, magnesium and calcium, as well as protein and fiber.     How can the DASH diet help me stay healthy?  Getting too much sodium (salt) in your diet can contribute to high blood pressure (also called hypertension). Some salt is in foods naturally, and some salt is added to food when it is processed or prepared. Following the DASH diet can help you lower your blood pressure, or prevent high blood pressure, by reducing the amount of sodium in your diet to less than 2,300 mg per day.  The fruits, vegetables and whole grains recommended in the DASH diet provide many other elements of a healthy diet, such as lycopene, beta-carotene and isoflavones. These can help protect your body against common health conditions, such as cancer, osteoporosis, stroke and diabetes. Following the DASH diet can also help reduce your risk of heart disease by lowering your low-density lipoprotein (LDL, or "bad") " cholesterol level.  Following the DASH diet may drop your blood pressure by a few points in as little as 2 weeks. However, you should not stop taking your blood pressure medicine, or any other prescribed medicine, without talking to your doctor first.    What kinds of foods are included in the DASH diet?  The DASH diet is nutritionally balanced for good health. You dont need to buy any special foods or pills, or cook with any special recipes, to follow the DASH diet. If you follow the DASH diet, you will eat about 2,000 calories each day. These calories will come from a variety of foods.    Where is sodium in my diet?  Food Serving Sodium Content   ¼ teaspoon table salt 575 mg   ½ teaspoon table salt 1,150 mg   1 teaspoon table salt 2,300 mg   1 hot dog 460 mg   1 regular fast-food hamburger 600 mg   2 ounces processed cheese 600 mg   1 tablespoon soy sauce 900 mg   1 serving frozen pizza with meat and vegetables 982 mg   8 ounces regular potato chips 1,192 mg     The DASH diet  Whole grains (6 to 8 servings a day)   Vegetables (4 to 5 servings a day)   Fruits (4 to 5 servings a day)   Low-fat or fat-free milk and milk products (2 to 3 servings a day)   Lean meats, poultry and fish (6 or fewer servings a day)   Nuts, seeds and beans (4 to 5 servings a week)   Fats and oils (2 to 3 servings a day)   Sweets, preferably low-fat or fat-free (5 or fewer a week)   Sodium (no more than 2,300 mg a day)   You can adapt the DASH diet to meet your needs. For example:  If you drink alcohol, limit yourself to 2 drinks or less per day for men and 1 drink or less per day for women.   To reduce your blood pressure even more, replace some of the carbohydrates in the DASH diet with low-fat protein and unsaturated fats.   If you need to lose weight, reduce the number of calories you eat to about 1,600 per day.   Follow a lower-sodium version (no more than 1,500 mg daily) if you are 40 years of age or older, you are   "or you already have high blood pressure.     How can I change my eating habits?  Dont be discouraged if following the DASH diet is difficult at first. Start with small, achievable goals. The following ideas can help you make healthy changes:  Pay attention to your current eating habits. Make a list of everything you eat for 2 or 3 days. Compare this list to the DASH diet recommendations above and see what changes you need to make in your diet.   Make one change at a time. For example, start by choosing lower-fat versions of your favorite foods or adding more whole grains to your meals.   Learn what makes a serving for each type of food. For example, 1 serving equals 1 slice of bread, 8 ounces of milk, 1 cup of raw vegetables or 1/2 cup of cooked vegetables. For more serving sizes, go to the [facility] access hospital site. Dont have a measuring cup? One serving (3 ounces) of meat or poultry is about the size of a deck of cards. One serving (1/2 cup) of rice or potato looks like half a baseball, and a serving of cheese is about the size of 4 stacked dice.   If eating more fruits and vegetables gives you gas, bloating or diarrhea, increase these foods slowly. You can also talk to your family doctor about taking over-the-counter medicines to reduce these symptoms until your body adjusts.   Get more exercise. Physical activity helps lower your blood pressure and can help you lose more weight.   Use salt-free seasonings, such as spices and herbs, to add flavor to your recipes and reduce or eliminate salt.   Include as many fresh and unprocessed foods as possible. Cut back on frozen dinners, packaged mixes, canned soups and bottled salad dressings, which are often high in sodium.   When buying canned, frozen or processed foods, check nutrition labels for the amount of sodium, sugar and saturated fat. Look for the phrases "no salt added," "sodium-free," "low sodium" or "very low sodium" on food packages. Choose foods with monounsaturated and " polyunsaturated fats.   Steam, grill, poach, roast or stir-messina foods. Use low-sodium broth or water instead of butter or oil for sautéing.   When you eat at a restaurant, ask how foods are prepared. Ask if your order can be made without added salt. Dont add salty condiments, such as ketchup, mustard, pickles or sauces, to your food.   Other Organizations  Centers for Disease Control and Prevention   National Heart, [employer], [facility] [employer] [place]   Written by familydoctor.org editorial staff  Reviewed/Updated: 02/11  Created: 08/09

## 2019-08-02 NOTE — PROGRESS NOTES
(Portions of this note were dictated using voice recognition software and may contain dictation related errors in spelling/grammar/syntax not found on text review)    CC:   Chief Complaint   Patient presents with    Follow-up       HPI: 59 y.o. male     Diabetes, on metforminXR 500 2 pills am/1 pill pm.(diarrhea if full 2000 mg daily). glipizide 10 mg twice a day , Januvia  100 mg daily   last A1c up to 8.4.  Wrote message to him informing of potential switching to G LP 1 agonist from his Januvia and glipizide at that time but patient did not respond regarding any medication change      Does not take aspirin because of ALLERGY.    eye exam : 5/2018, plans to make appt with Dr. Rapp.   Sees a dentist regularly.    No neuropathy symptoms     CAD:  Presentation march 2017 with angina. CAD on angio, no revasc ability. Medically managed with statin with lipitor 80 mg daily, plavix, metoprolol suc 25 daily, losartan 100 mg daily, imdur 30 mg daily.  Had a nuclear stress test done in September 2018, EKG portion was positive, the however LV EF of 54%, imaging showed no significant myocardial ischemia or injury     Hypertension on losartan 100 mg daily plus metoprolol 50 mg daily .  Blood pressure is elevated     Ulcerative colitis, prior followed by GI. Currently not taking mesalamine (asymptomatic at this time)    Omeprazole PRN for GERD sx    Does have occasional right hand stiffness when he wakes up, gets better after a few minutes.  No pain during the day.  He does lift a lot of heavy stuff at work.  Right-hand dominant.  Also notices some medial elbow pain when he is lifting a lot of heavy objects.  No direct trauma.    Past Medical History:   Diagnosis Date    Coronary artery disease involving native coronary artery of native heart without angina pectoris 7/29/2017    Diabetes mellitus, type II     HTN (hypertension)     Ulcerative colitis        Past Surgical History:   Procedure Laterality Date    CATARACT  EXTRACTION Bilateral     COLONOSCOPY N/A 2016    Performed by Aristeo Lynn Jr., MD at [facility]       Family History   Problem Relation Age of Onset    Coronary artery disease Mother 75    Coronary artery disease Father 55    Coronary artery disease Brother 59    Diabetes Brother     Prostate cancer Neg Hx     Colon cancer Neg Hx        Social History     Socioeconomic History    Marital status:      Spouse name: Not on file    Number of children: Not on file    Years of education: Not on file    Highest education level: Not on file   Occupational History    Not on file   Social Needs    Financial resource strain: Not on file    Food insecurity:     Worry: Not on file     Inability: Not on file    Transportation needs:     Medical: Not on file     Non-medical: Not on file   Tobacco Use    Smoking status: Former Smoker     Last attempt to quit: 3/16/2008     Years since quittin.3    Smokeless tobacco: Former User   Substance and Sexual Activity    Alcohol use: Yes     Alcohol/week: 0.0 oz     Comment: occasionally    Drug use: No    Sexual activity: Yes   Lifestyle    Physical activity:     Days per week: Not on file     Minutes per session: Not on file    Stress: Not on file   Relationships    Social connections:     Talks on phone: Not on file     Gets together: Not on file     Attends Synagogue service: Not on file     Active member of club or organization: Not on file     Attends meetings of clubs or organizations: Not on file     Relationship status: Not on file   Other Topics Concern    Not on file   Social History Narrative    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     No recent exercise    Healthy diet  (veg)     Lab Results   Component Value Date    WBC 8.76 2018    HGB 11.7 (L) 2018    HCT 36.5 (L) 2018     2018    CHOL 155 2018    TRIG 136 2018    HDL 38 (L) 2018    ALT 20 2018    AST 15 (L) 2018      11/05/2018    K 4.9 11/05/2018     11/05/2018    CREATININE 0.65 11/05/2018    CALCIUM 9.6 11/05/2018    BUN 10 11/05/2018    CO2 24 11/05/2018    TSH 3.124 09/06/2017    PSA 0.68 09/06/2017    INR 1.1 03/09/2017    HGBA1C 8.4 (H) 11/05/2018    LDLCALC 89.8 11/05/2018     (H) 11/05/2018         Hemoglobin A1C (%)   Date Value   11/05/2018 8.4 (H)   12/05/2017 7.7 (H)   09/06/2017 9.3 (H)   02/23/2017 9.5 (H)   11/15/2016 12.2 (H)   12/11/2015 8.7 (H)          ROS:  GENERAL: No fever, chills, fatigability or weight loss.  SKIN: No rashes, no itching.  HEAD: No headaches.  EYES: No visual changes  EARS: No ear pain or changes in hearing.  NOSE: No congestion or rhinorrhea.  MOUTH & THROAT: No hoarseness, change in voice, or sore throat.  NODES: Denies swollen glands.  CHEST: Denies DENNIS, cyanosis, wheezing, cough and sputum production.  CARDIOVASCULAR: Denies chest pain, PND, orthopnea.  ABDOMEN:  Above.  URINARY: No flank pain, dysuria or hematuria.  PERIPHERAL VASCULAR: No claudication or cyanosis.  MUSCULOSKELETAL:  Above  NEUROLOGIC: No weakness or numbness.    Vital signs reviewed  PE:   APPEARANCE: Well nourished, well developed, in no acute distress.    HEAD: Normocephalic, atraumatic.  EYES: PERRL. EOMI.   Conjunctivae noninjected.  EARS: TM's intact. Light reflex normal. No retraction or perforation  NOSE: Mucosa pink. Airway clear.  MOUTH & THROAT: No tonsillar enlargement. No pharyngeal erythema or exudate.   NECK: Supple with no cervical lymphadenopathy.  No carotid bruits.  No thyromegaly  CHEST: Good inspiratory effort. Lungs clear to auscultation with no wheezes or crackles.  CARDIOVASCULAR: Normal S1, S2. No rubs, murmurs, or gallops.  ABDOMEN: Bowel sounds normal. Not distended. Soft. No tenderness or masses. No organomegaly. No evidence of inguinal hernia bilaterally or ventral hernia  EXTREMITIES: No edema, cyanosis, or clubbing.  Pain medial epicondyle on right, worse with resisted  wrist flexion.  Signs of synovitis of the hands or wrists.  Range of motion.  Ft exam up-to-date October 2018      IMPRESSION  1. Uncontrolled type 2 diabetes mellitus without complication, without long-term current use of insulin    2. Coronary artery disease, angina presence unspecified, unspecified vessel or lesion type, unspecified whether native or transplanted heart    3. Essential hypertension    4. Ulcerative colitis with complication, unspecified location    5. Medial epicondylitis of elbow, right    6. Encounter for screening for malignant neoplasm of prostate            PLAN  Diabetes health maintenance:  -- advise regular and consistent glucose monitoring and medication compliance.  -- advise daily foot checks  -- advise yearly ophthalmologic exams  -- advise adequate dietary and exercise modification  -- advise regular dental visits  -- advise daily low-dose aspirin use if patient is not on other anticoagulants and there are no other contraindications.  -- Medication management:  Can continue metformin extended release, 1500 mg daily.  Stop glipizide and Januvia, start Trulicity 0.75 mg weekly.    HTN:  Not controlled.  Feels like his home blood pressures are much better controlled in the 120s over 80s.  For now he can continue metoprolol 50 mg daily and Continue losartan 100 mg daily.  Ambulatory monitoring advised the blood pressures, sent me 2 weeks of readings over my chart.  May consider changing losartan over to Hyzaar for persistently elevated blood pressures.    CAD.  Asymptomatic.  Review recent workup.  Would recommend continued maximization of risk factor control.  Continue Plavix.  Does not take aspirin secondary to allergy    Ulcerative colitis:  Not clinically active at this time    Medial epicondylitis:  Activity modification, local ice, Tylenol as needed for pain. Notify for more persistent problems    Orders Placed This Encounter   Procedures    Microalbumin/creatinine urine ratio     Hemoglobin A1c    CBC auto differential    Comprehensive metabolic panel    Lipid panel    TSH    PSA, Screening     Three month labs since starting above dm med, appt to follow.     HEALTH SCREENINGS   Immunizations:  Pneumovax 2015  Tetanus around 2013   flu:  Up-to-date   zoster:  Can get at pharmacy    Age/Gender Appropriate screenings:  Colonoscopy in 2016, polyps removed, scattered pseudopolyps, rtc 5 yrs.  Prostate screening done 9/2017 ,will check PSA with next set of labs

## 2019-08-08 ENCOUNTER — TELEPHONE (OUTPATIENT)
Dept: FAMILY MEDICINE | Facility: CLINIC | Age: 59
End: 2019-08-08

## 2019-08-08 NOTE — TELEPHONE ENCOUNTER
PA denied for Trulicty.  [employer] states must have trial and failure of Bydureon, Byetta, or Victoza.  Please advise.

## 2019-08-20 ENCOUNTER — TELEPHONE (OUTPATIENT)
Dept: FAMILY MEDICINE | Facility: CLINIC | Age: 59
End: 2019-08-20

## 2019-08-20 NOTE — TELEPHONE ENCOUNTER
Sade was calling to see if we received appeal form and are pursuing appeal.  Advised that we were not since the CovermyMeds website recommended other medications that were covered.  Patient was prescribed Bydureon which was covered through patient's insurance which was recommended to try first before PA for Trulicity would ever be approved.

## 2019-08-20 NOTE — TELEPHONE ENCOUNTER
----- Message from Casper Duncan sent at 8/20/2019  2:20 PM CDT -----  Contact: Sade miller/ Suzie  [phone]   Sade is calling to speak with you about Rx TRULICITY  Please call back to assist at [phone]  Ref# [id number]

## 2019-08-30 NOTE — TELEPHONE ENCOUNTER
[facility]  [place] [facility] Record Number:  [id number]  Place of Service where encounter took place:  [facility] ([facility]) [[id number]]  Chief Complaint   Patient presents with     Shingles       HPI:    Yoanna Phillips  is a 78 year old (1941), who is being seen today for an episodic care visit.  HPI information obtained from: facility chart records, facility staff and patient report.     Today's concern is:    ICD-10-CM    1. Rash R21      Resident pleasant upon today's exam. Exercising with therapy on stationary bike. Denies SOB or chest pain. Did however, complain of rash on left forearm with some pain, burning, and feels very sensitive. Onset 1-2 days ago. Has not tried any new cosmetics/lotion/detergent. Denies any additional skin problems. Otherwise at baseline.     Past Medical and Surgical History reviewed in Epic today.    MEDICATIONS:  Current Outpatient Medications   Medication Sig Dispense Refill     acetaminophen (TYLENOL) 325 MG tablet Take 650 mg by mouth every 6 hours as needed for pain       CARBAMAZEPINE ER PO Take 300 mg by mouth 2 times daily       cyanocobalamin (VITAMIN B12) 1000 MCG/ML injection Inject 1,000 mcg into the muscle every day shift starting on the 25th and ending on the 25th every month       FOLIC ACID PO Take 1 mg by mouth daily       GABAPENTIN PO Take 600 mg by mouth 3 times daily        LOSARTAN POTASSIUM PO Take 75 mg by mouth daily        Multiple Vitamins-Minerals (CENTRUM SILVER) per tablet Take 1 tablet by mouth daily       NUTRITIONAL SUPPLEMENT LIQD Take by mouth 2 times daily Give 120mL       Nystatin POWD Apply to redness topically as needed for Rash QD AND Apply to groin topically every day and evening shift for Rash Until resolved.       Thiamine HCl (VITAMIN B-1 PO) Take 100 mg by mouth daily       tiotropium (SPIRIVA) 18 MCG capsule Inhale 18 mcg into the lungs daily       valACYclovir (VALTREX) 1000 mg tablet Take 1,000 mg by  Pt states that the Metformin is not agreeing with his stomach.  Pt states that every time he takes it he gets sick and is requesting something else.  Please advise   "mouth 2 times daily for shingles for 7 Days       VITAMIN D, CHOLECALCIFEROL, PO Take 2,000 Units by mouth daily       acetaminophen (TYLENOL) 500 MG tablet Take 1,000 mg by mouth 2 times daily Give at HS tell pt. she is getting Tylenol AND give 2 tablet by mouth every 24 hours as needed for Pain 1000mg QD PRN       AMOXICILLIN PO Take 2 g by mouth as needed for prophylaxis QD Prior to dental procedure.       Calamine external lotion Apply topically 2 times daily to LEFT FOREARM for Itching       hydrocortisone 1 % CREA cream Apply to Left forearm topically two times a day for Apply to (Lt) Forearm BID affected areas until resloved and Left forearm AND Apply to (Lt) Forearm topically every 12 hours as needed for Rash and redness           REVIEW OF SYSTEMS:  4 point ROS including Respiratory, CV, GI and , other than that noted in the HPI,  is negative    Objective:  BP (!) 140/76   Pulse 65   Temp 98.4  F (36.9  C)   Resp 18   Ht 1.549 m (5' 1\")   Wt 77.8 kg (171 lb 9.6 oz)   SpO2 97%   BMI 32.42 kg/m    Exam:  GENERAL APPEARANCE:  Alert, in no distress  RESP:  respiratory effort and palpation of chest normal, lungs clear to auscultation   CV:  Palpation and auscultation of heart done , regular rate and rhythm, no murmur, rub, or gallop  SKIN:  Rash located left forarm, line of clear blisters.     Labs:   Labs done in SNF are in [facility]. Please refer to them using [facility]Care Everywhere. and Recent labs in [facility] reviewed by me today.     ASSESSMENT/PLAN:  (R21) Rash  (primary encounter diagnosis)  Comment: Clear blister rash with burning, pain and sensitivity consistent with diagnosis of herpes zoster.   Plan:  1.) Valacyclovir 1000 mg/BID x 7 days. Try to isolate as able. Continue to monitor and notify NP with changes.       Electronically signed by:  TANIYA Zaman CNP  [facility]         "

## 2019-09-04 ENCOUNTER — PATIENT MESSAGE (OUTPATIENT)
Dept: FAMILY MEDICINE | Facility: CLINIC | Age: 59
End: 2019-09-04

## 2019-09-04 RX ORDER — GLIPIZIDE 5 MG/1
5 TABLET ORAL
Qty: 60 TABLET | Refills: 11 | Status: SHIPPED | OUTPATIENT
Start: 2019-09-04 | End: 2020-05-15 | Stop reason: SDUPTHER

## 2019-09-04 NOTE — TELEPHONE ENCOUNTER
I think we can try back glipizide at 5 mg twice a day (always with meals) along with your bydureon and metformin.  We can recheck labs in the next 3 months    Facundo Alexis MD    ===View-only below this line===      ----- Message -----     From: Michoacano Lind     Sent: 9/4/2019  3:04 PM CDT       To: Facundo Alexis MD  Subject: Prescription    Hi doc I finished 4 weeks courses of dureon ing but morning sugar levels stay high from 100 to 215 Up and down can I start glipizide  please let me know   Thanks

## 2019-09-13 RX ORDER — LOSARTAN POTASSIUM 100 MG/1
TABLET ORAL
Qty: 90 TABLET | Refills: 3 | Status: SHIPPED | OUTPATIENT
Start: 2019-09-13 | End: 2020-08-24 | Stop reason: SDUPTHER

## 2019-10-03 ENCOUNTER — PATIENT MESSAGE (OUTPATIENT)
Dept: FAMILY MEDICINE | Facility: CLINIC | Age: 59
End: 2019-10-03

## 2019-10-07 NOTE — TELEPHONE ENCOUNTER
There is an alternative brand called Victoza that we could try that works the same way as Bydureon , but it is just a daily injection instead of weekly injection.  Would you be willing to try that?  If so I can send this to your pharmacy in place of the Bydureon.  Please let just let me know.  Also for any rash issues you could take some Claritin or Zyrtec or Benadryl to see if this gets better.  If there are any persistent or worsening issues, we may need to further evaluate in the clinic.    Facundo Alexis MD        ----- Message -----     From: Michoacano Lind     Sent: 10/3/2019  5:02 PM CDT       To: Facundo Alexis MD  Subject: Non-Urgent Medical    Hi  i have allergy reactions from bydureon also give me headaches for few days give me bumps on my stomach

## 2019-10-21 ENCOUNTER — PATIENT OUTREACH (OUTPATIENT)
Dept: ADMINISTRATIVE | Facility: OTHER | Age: 59
End: 2019-10-21

## 2019-10-24 ENCOUNTER — OFFICE VISIT (OUTPATIENT)
Dept: CARDIOLOGY | Facility: CLINIC | Age: 59
End: 2019-10-24
Payer: MEDICAID

## 2019-10-24 VITALS
WEIGHT: 164.44 LBS | DIASTOLIC BLOOD PRESSURE: 108 MMHG | SYSTOLIC BLOOD PRESSURE: 189 MMHG | HEIGHT: 66 IN | BODY MASS INDEX: 26.43 KG/M2 | HEART RATE: 87 BPM | OXYGEN SATURATION: 98 %

## 2019-10-24 DIAGNOSIS — E78.2 MIXED HYPERLIPIDEMIA: ICD-10-CM

## 2019-10-24 DIAGNOSIS — I10 ESSENTIAL HYPERTENSION: ICD-10-CM

## 2019-10-24 DIAGNOSIS — I25.10 CORONARY ARTERY DISEASE INVOLVING NATIVE CORONARY ARTERY OF NATIVE HEART WITHOUT ANGINA PECTORIS: Primary | ICD-10-CM

## 2019-10-24 DIAGNOSIS — I25.5 CARDIOMYOPATHY, ISCHEMIC: ICD-10-CM

## 2019-10-24 PROCEDURE — 99213 PR OFFICE/OUTPT VISIT, EST, LEVL III, 20-29 MIN: ICD-10-PCS | Mod: S$PBB,,, | Performed by: INTERNAL MEDICINE

## 2019-10-24 PROCEDURE — 99213 OFFICE O/P EST LOW 20 MIN: CPT | Mod: S$PBB,,, | Performed by: INTERNAL MEDICINE

## 2019-10-24 PROCEDURE — 99999 PR PBB SHADOW E&M-EST. PATIENT-LVL IV: CPT | Mod: PBBFAC,,, | Performed by: INTERNAL MEDICINE

## 2019-10-24 PROCEDURE — 99214 OFFICE O/P EST MOD 30 MIN: CPT | Mod: PBBFAC | Performed by: INTERNAL MEDICINE

## 2019-10-24 PROCEDURE — 99999 PR PBB SHADOW E&M-EST. PATIENT-LVL IV: ICD-10-PCS | Mod: PBBFAC,,, | Performed by: INTERNAL MEDICINE

## 2019-10-24 RX ORDER — AMLODIPINE BESYLATE 5 MG/1
5 TABLET ORAL DAILY
Qty: 30 TABLET | Refills: 11 | Status: SHIPPED | OUTPATIENT
Start: 2019-10-24 | End: 2020-10-26 | Stop reason: SDUPTHER

## 2019-10-24 NOTE — PROGRESS NOTES
/    Interventional Cardiology Clinic Note  Reason for Visit: Coronary artery disease    HPI:   Michoacano Lind is a 59 y.o. male with multivessel CAD not amendable to surgery, diabetes mellitus type 2, HLD, and HTN who presents for follow up for coronary artery disease. He was admitted to an [facility] with angina in 2017 and a subsequent coronary angiogram revealed multivessel CAD. He was recommended for surgery but Dr. Espitia's note states that Dr. Disla ([facility]) reviewed his angiograms and determined that he did not have good targets for revascularization. At that time he transferred his cardiology [place] [facility]  He was last seen in this clinic on 18 and at that time, complained of one episode of angina while moving furniture. He underwent exercise nuclear stress test on 18 and it was negative for ischemia. Since then he has not had any episodes of angina. He states he gets short of breath only with heavy exertion, like running, but this has not worsened in frequency or severity over the past few years. He denies LE edema, orthopnea, PND, palpitations, syncope or near syncope.  He recently moved to the [facility] to a house with 2 acres a few months ago. 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 He also stopped taking his Atorvastatin and few months ago for no particular reason, but states he will resume it. He has also had issues with glycemic control; his most recent A1c was 8.4. His PCP has been adjusting his medications, but the most recent injectable medication he tried gave him side effects. He does report compliance with a heart healthy diet and eats a vegetarian diet. He checks his blood pressure at home and it runs 130-150 systolic.   Of note, his father had a history of a MI at age 55 which he  from. His brother was diagnosed with  CAD at age of 59. He is a former smoker (quit many years ago).     ROS: /    Constitution: Negative for diaphoresis and malaise/fatigue.   HENT: Negative for nosebleeds.  Cardiovascular: Negative for chest pain, claudication, dyspnea on exertion, leg swelling, near-syncope, orthopnea, palpitations, paroxysmal nocturnal dyspnea and syncope.   Respiratory: Negative for shortness of breath, snoring and wheezing.    Hematologic/Lymphatic: Negative for bleeding problem. Does not bruise/bleed easily.   Musculoskeletal: Negative for muscle cramps and myalgias.   Gastrointestinal: Negative for bloating, abdominal pain, nausea and vomiting.   Neurological: Negative for dizziness, headaches and light-headedness.   PMH:     Past Medical History:   Diagnosis Date    Coronary artery disease involving native coronary artery of native heart without angina pectoris 7/29/2017    Diabetes mellitus, type II     HTN (hypertension)     Ulcerative colitis      Past Surgical History:   Procedure Laterality Date    CATARACT EXTRACTION Bilateral 2013    COLONOSCOPY N/A 1/22/2016    Procedure: COLONOSCOPY;  Surgeon: Aristeo Lynn Jr., MD;  Location: [facility];  Service: Endoscopy;  Laterality: N/A;     Allergies:     Review of patient's allergies indicates:   Allergen Reactions    Aspirin     Lisinopril Other (See Comments)     Cough 2/2 aceI    Aspirin      Other^bloody diarrhea     Medications:     Current Outpatient Medications on File Prior to Visit   Medication Sig Dispense Refill    atorvastatin (LIPITOR) 80 MG tablet Take 1 tablet (80 mg total) by mouth once daily. 90 tablet 3    clopidogrel (PLAVIX) 75 mg tablet Take 1 tablet (75 mg total) by mouth once daily. 90 tablet 4    glipiZIDE (GLUCOTROL) 5 MG tablet Take 1 tablet (5 mg total) by mouth 2 (two) times daily before meals. 60 tablet 11    losartan (COZAAR) 100 MG tablet TAKE 1 TABLET BY MOUTH ONCE DAILY 90 tablet 3    metFORMIN (GLUCOPHAGE-XR) 500 MG 24 hr tablet TAKE 2 TABLETS BY MOUTH TWICE DAILY WITH  MEALS 120 tablet 11     "metoprolol succinate (TOPROL-XL) 50 MG 24 hr tablet Take 1 tablet (50 mg total) by mouth once daily. 90 tablet 3    blood sugar diagnostic (BLOOD GLUCOSE TEST) Strp Dispense 1 meter and 100 test strips (insurance covered brand). Test sugar daily 100 strip 11    blood-glucose meter Misc Test qd 1 each 0    exenatide microspheres (BYDUREON) 2 mg/0.65 mL PnIj Inject 2 mg into the skin every 7 days. (Patient not taking: Reported on 10/24/2019) 4 each 11    nitroGLYCERIN (NITROSTAT) 0.4 MG SL tablet Place 1 tablet (0.4 mg total) under the tongue every 5 (five) minutes as needed for Chest pain. (Patient not taking: Reported on 10/24/2019) 30 tablet 1    pantoprazole (PROTONIX) 40 MG tablet TAKE ONE TABLET BY MOUTH ONCE DAILY (Patient not taking: Reported on 10/24/2019) 30 tablet 9     No current facility-administered medications on file prior to visit.      Social History:     Social History     Tobacco Use    Smoking status: Former Smoker     Last attempt to quit: 3/16/2008     Years since quittin.6    Smokeless tobacco: Former User   Substance Use Topics    Alcohol use: Not Currently     Alcohol/week: 0.0 standard drinks     Comment: occasionally     Family History:     Family History   Problem Relation Age of Onset    Coronary artery disease Mother 75    Coronary artery disease Father 55    Coronary artery disease Brother 59    Diabetes Brother     Prostate cancer Neg Hx     Colon cancer Neg Hx      Physical Exam:     Vitals:    10/24/19 1229 10/24/19 1231   BP: (!) 190/96 (!) 189/108   BP Location: Right arm Left arm   Patient Position: Sitting Sitting   BP Method: Large (Automatic) Large (Automatic)   Pulse: 92 87   SpO2: 98%    Weight: 74.6 kg (164 lb 7.4 oz)    Height: 5' 6" (1.676 m)          Constitutional: NAD, conversant  HEENT: Sclera anicteric, PERRLA, EOMI  Neck: No JVD, no carotid bruits  CV: RRR, no murmur, normal S1/S2   Pulses: 2+ radial pulses bilaterally, 2+ dorsalis pedis and 2+ " posterior tibial pulses bilaterally   Pulm: CTAB, no wheezes, rales, or ronchi  GI: Abdomen soft, NTND, +BS  Extremities: No LE edema, warm and well perfused  Skin: No ecchymosis, erythema, or ulcers  Psych: AOx3, appropriate affect  Neuro: No gross deficits    Labs:     Lab Results   Component Value Date     2018    K 4.9 2018     2018    CO2 24 2018    BUN 10 2018    CREATININE 0.65 2018    ANIONGAP 12 2018     Lab Results   Component Value Date    HGBA1C 8.4 (H) 2018     No results found for: BNP, BNPTRIAGEBLO Lab Results   Component Value Date    WBC 8.76 2018    HGB 11.7 (L) 2018    HCT 36.5 (L) 2018     2018    GRAN 6.0 2018    GRAN 68.0 2018     Lab Results   Component Value Date    CHOL 155 2018    HDL 38 (L) 2018    LDLCALC 89.8 2018    TRIG 136 2018          Imagin. Left heart cath (3/9/17):   Three vessel coronary artery disease.    1. significant disease in the LAD ,diffuse in the mid portion. small distal disease.    2. Cx OM's with diffuse disease.    3. right with distal disease and disease of the PDA.    Assessment:     1. Coronary artery disease involving native coronary artery of native heart without angina pectoris    2. Cardiomyopathy, ischemic    3. Essential hypertension    4. Uncontrolled type 2 diabetes mellitus without complication, without long-term current use of insulin    5. Mixed hyperlipidemia      Plan:     Coronary artery disease involving native coronary artery of native heart without angina pectoris  -- Stable.   -- Continue Plavix 75 mg daily (allergic to ASA) and high intensity statin.   -- Recommended aerobic exercise for at least 30 minutes 5 days/week.   -- Recommend strict glycemic control. Last A1C 8.4. He is having labs drawn in a couple of weeks and then following up with his PCP for medication adjustment.     Essential hypertension  --  Uncontrolled in clinic and at home.  -- Refer to digital hypertension program.   -- Continue Losartan 100 mg daily   -- Continue Metoprolol succinate 50 mg daily   -- Start Amlodipine 5 mg daily     Hyperlipidemia  -- Above goal on labs 11 months ago. LDL was 89. Recommend keeping LDL<70.  -- He has not been compliant with his high intensity statin but agrees to restart it.   -- He is adherent to heart healthy, vegetarian diet.     Ischemic CMP  2/23/17 TTE demonstrated LVEF=40-45%. His LVEF was measured as 55% on 2017 cath.   -patient is euvolemic by exam with NYHA class I symptoms  - continue BB and ARB.     DM2.  The importance of tight glycemic control was discussed with the patient.  He was offered Endocrinology consult for help with his diabetes.  The patient declined and stated that he is working on this with his primary care physician.  The patient's last hemoglobin A1c on 11/05/2018 was 8.4    Signed:  Mayi Sanches PA-C  Interventional Cardiology   [id number]  10/24/2019 12:59 PM    I have personally taken the history and examined this patient. I agree with the PA's note as stated above.  All the patient's questions were answered.

## 2019-10-25 ENCOUNTER — PATIENT OUTREACH (OUTPATIENT)
Dept: ADMINISTRATIVE | Facility: HOSPITAL | Age: 59
End: 2019-10-25

## 2019-10-25 ENCOUNTER — PATIENT MESSAGE (OUTPATIENT)
Dept: ADMINISTRATIVE | Facility: HOSPITAL | Age: 59
End: 2019-10-25

## 2019-10-28 ENCOUNTER — PATIENT MESSAGE (OUTPATIENT)
Dept: ADMINISTRATIVE | Facility: OTHER | Age: 59
End: 2019-10-28

## 2019-10-29 RX ORDER — METOPROLOL SUCCINATE 50 MG/1
TABLET, EXTENDED RELEASE ORAL
Qty: 90 TABLET | Refills: 3 | Status: SHIPPED | OUTPATIENT
Start: 2019-10-29 | End: 2020-10-20 | Stop reason: SDUPTHER

## 2019-11-01 DIAGNOSIS — E11.9 TYPE 2 DIABETES MELLITUS WITHOUT COMPLICATION, UNSPECIFIED WHETHER LONG TERM INSULIN USE: ICD-10-CM

## 2019-11-08 ENCOUNTER — OFFICE VISIT (OUTPATIENT)
Dept: FAMILY MEDICINE | Facility: CLINIC | Age: 59
End: 2019-11-08
Payer: MEDICAID

## 2019-11-08 VITALS
HEART RATE: 94 BPM | DIASTOLIC BLOOD PRESSURE: 78 MMHG | HEIGHT: 66 IN | TEMPERATURE: 98 F | WEIGHT: 160.25 LBS | OXYGEN SATURATION: 98 % | BODY MASS INDEX: 25.75 KG/M2 | SYSTOLIC BLOOD PRESSURE: 138 MMHG

## 2019-11-08 PROCEDURE — 90471 IMMUNIZATION ADMIN: CPT | Mod: PBBFAC,PO

## 2019-11-08 PROCEDURE — 99214 OFFICE O/P EST MOD 30 MIN: CPT | Mod: 25,S$PBB,, | Performed by: FAMILY MEDICINE

## 2019-11-08 PROCEDURE — 99214 PR OFFICE/OUTPT VISIT, EST, LEVL IV, 30-39 MIN: ICD-10-PCS | Mod: 25,S$PBB,, | Performed by: FAMILY MEDICINE

## 2019-11-08 PROCEDURE — 99999 PR PBB SHADOW E&M-EST. PATIENT-LVL III: ICD-10-PCS | Mod: PBBFAC,,, | Performed by: FAMILY MEDICINE

## 2019-11-08 PROCEDURE — 99213 OFFICE O/P EST LOW 20 MIN: CPT | Mod: PBBFAC,PO | Performed by: FAMILY MEDICINE

## 2019-11-08 PROCEDURE — 99999 PR PBB SHADOW E&M-EST. PATIENT-LVL III: CPT | Mod: PBBFAC,,, | Performed by: FAMILY MEDICINE

## 2019-11-08 RX ORDER — ATORVASTATIN CALCIUM 80 MG/1
80 TABLET, FILM COATED ORAL DAILY
Qty: 90 TABLET | Refills: 3 | Status: SHIPPED | OUTPATIENT
Start: 2019-11-08 | End: 2020-05-15

## 2019-11-08 NOTE — PROGRESS NOTES
(Portions of this note were dictated using voice recognition software and may contain dictation related errors in spelling/grammar/syntax not found on text review)    CC:   Chief Complaint   Patient presents with    Follow-up       HPI: 59 y.o. male     Diabetes, on metforminXR 500 2 pills am/1 pill pm.(diarrhea if full 2000 mg daily). glipizide 5 mg twice a day.   last A1c uncontrolled.  Tried to switch him to weekly G LP 1 agonist with Trulicity but apparently this was not covered and recommendation was to try bydureon, Victoza, or Byetta 1st.  Tried later by to do Bydureon weekly but apparently had some local allergic reaction with injections.  Had advised on potential of Victoza although he did not seem interested in doing a daily injection.  Currently would like to find other alternative then G LP      Does not take aspirin because of ALLERGY.    eye exam : appt in December.   Sees a dentist regularly.    No neuropathy symptoms     CAD:  Presentation march 2017 with angina. CAD on angio, no revasc ability. Medically managed with statin with lipitor 80 mg daily, plavix, metoprolol suc 25 daily, losartan 100 mg daily, imdur 30 mg daily.  Had a nuclear stress test done in September 2018, EKG portion was positive, the however LV EF of 54%, imaging showed no significant myocardial ischemia or injury .  Last cardiology visit was on 10/24/2019     Hypertension on losartan 100 mg daily plus metoprolol 50 mg daily .  Blood pressure very elevated during cardiology visit.  Amlodipine 5 mg daily started.  BP stable today     Ulcerative colitis, prior followed by GI. Currently not taking mesalamine (asymptomatic at this time)    Omeprazole PRN for GERD sx        Past Medical History:   Diagnosis Date    Coronary artery disease involving native coronary artery of native heart without angina pectoris 7/29/2017    Diabetes mellitus, type II     HTN (hypertension)     Mixed hyperlipidemia 10/24/2019    Ulcerative colitis         Past Surgical History:   Procedure Laterality Date    CATARACT EXTRACTION Bilateral     COLONOSCOPY N/A 2016    Procedure: COLONOSCOPY;  Surgeon: Aristeo Lynn Jr., MD;  Location: [facility];  Service: Endoscopy;  Laterality: N/A;       Family History   Problem Relation Age of Onset    Coronary artery disease Mother 75    Coronary artery disease Father 55    Coronary artery disease Brother 59    Diabetes Brother     Prostate cancer Neg Hx     Colon cancer Neg Hx        Social History     Socioeconomic History    Marital status:      Spouse name: Not on file    Number of children: Not on file    Years of education: Not on file    Highest education level: Not on file   Occupational History    Not on file   Social Needs    Financial resource strain: Not on file    Food insecurity:     Worry: Not on file     Inability: Not on file    Transportation needs:     Medical: Not on file     Non-medical: Not on file   Tobacco Use    Smoking status: Former Smoker     Last attempt to quit: 3/16/2008     Years since quittin.6    Smokeless tobacco: Former User   Substance and Sexual Activity    Alcohol use: Not Currently     Alcohol/week: 0.0 standard drinks     Comment: occasionally    Drug use: No    Sexual activity: Yes   Lifestyle    Physical activity:     Days per week: Not on file     Minutes per session: Not on file    Stress: Not on file   Relationships    Social connections:     Talks on phone: Not on file     Gets together: Not on file     Attends [employer] service: Not on file     Active member of club or organization: Not on file     Attends meetings of clubs or organizations: Not on file     Relationship status: Not on file   Other Topics Concern    Not on file   Social History Narrative    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     No recent exercise    Healthy diet  (veg)     Lab Results   Component Value Date    WBC 7.50 2019    HGB 11.7 (L) 2019    HCT 36.0 (L)  11/04/2019     11/04/2019    CHOL 105 (L) 11/04/2019    TRIG 93 11/04/2019    HDL 32 (L) 11/04/2019    ALT 43 11/04/2019    AST 21 11/04/2019     11/04/2019    K 4.6 11/04/2019     11/04/2019    CREATININE 0.64 11/04/2019    CALCIUM 9.1 11/04/2019    BUN 8 (L) 11/04/2019    CO2 25 11/04/2019    TSH 1.930 11/04/2019    PSA 0.60 11/04/2019    INR 1.1 03/09/2017    HGBA1C 8.1 (H) 11/04/2019    LDLCALC 54.4 (L) 11/04/2019     (H) 11/04/2019         Hemoglobin A1C (%)   Date Value   11/04/2019 8.1 (H)   11/05/2018 8.4 (H)   12/05/2017 7.7 (H)   09/06/2017 9.3 (H)   02/23/2017 9.5 (H)   11/15/2016 12.2 (H)        ROS:  GENERAL: No fever, chills, fatigability or weight loss.  SKIN: No rashes, no itching.  HEAD: No headaches.  EYES: No visual changes  EARS: No ear pain or changes in hearing.  NOSE: No congestion or rhinorrhea.  MOUTH & THROAT: No hoarseness, change in voice, or sore throat.  NODES: Denies swollen glands.  CHEST: Denies DENNIS, cyanosis, wheezing, cough and sputum production.  CARDIOVASCULAR: Denies chest pain, PND, orthopnea.  ABDOMEN: No nausea, vomiting, or changes in bowel function.  URINARY: No flank pain, dysuria or hematuria.  PERIPHERAL VASCULAR: No claudication or cyanosis.  MUSCULOSKELETAL: No joint stiffness or swelling. Denies back pain.  NEUROLOGIC: No weakness or numbness.      Vital signs reviewed  PE:   APPEARANCE: Well nourished, well developed, in no acute distress.    HEAD: Normocephalic, atraumatic.  EYES: PERRL. EOMI.   Conjunctivae noninjected.  EARS: TM's intact. Light reflex normal. No retraction or perforation  NOSE: Mucosa pink. Airway clear.  MOUTH & THROAT: No tonsillar enlargement. No pharyngeal erythema or exudate.   NECK: Supple with no cervical lymphadenopathy.  No carotid bruits.  No thyromegaly  CHEST: Good inspiratory effort. Lungs clear to auscultation with no wheezes or crackles.  CARDIOVASCULAR: Normal S1, S2. No rubs, murmurs, or  gallops.  ABDOMEN: Bowel sounds normal. Not distended. Soft. No tenderness or masses. No organomegaly. No evidence of inguinal hernia bilaterally or ventral hernia  EXTREMITIES: No edema, cyanosis, or clubbing.    DIABETIC FOOT EXAM: Protective Sensation (w/ 10 gram monofilament):  Right: Intact  Left: Intact    Visual Inspection:  Normal -  Bilateral    Pedal Pulses:   Right: Present  Left: Present    Posterior tibialis:   Right:Present  Left: Present            IMPRESSION  No diagnosis found.        PLAN  Diabetes health maintenance:  -- advise regular and consistent glucose monitoring and medication compliance.  -- advise daily foot checks  -- advise yearly ophthalmologic exams  -- advise adequate dietary and exercise modification  -- advise regular dental visits  -- advise daily low-dose aspirin use if patient is not on other anticoagulants and there are no other contraindications.  -- Medication management:  Continue metformin 1500 mg daily, glipizide 5 mg b.i.d..  Wishes to try something other than G LP injections.  Will try Jardiance 10 mg daily, potentially titrating up to 25 mg daily.    HTN:  Stable.  Continue current therapy    CAD.  Asymptomatic.    Would recommend continued maximization of risk factor control.  Continue Plavix.  Does not take aspirin secondary to allergy.  Refilled his statin as he will need a new prescription.    Ulcerative colitis:  Not clinically active at this time    Three month labs , appt to follow.   Orders Placed This Encounter   Procedures    Influenza - Quadrivalent (PF)    Comprehensive metabolic panel    Lipid panel    Hemoglobin A1c       HEALTH SCREENINGS   Immunizations:  Pneumovax 2015  Tetanus around 2013   flu:  Today   zoster:  Can get at pharmacy    Age/Gender Appropriate screenings:  Colonoscopy in 2016, polyps removed, scattered pseudopolyps, rtc 5 yrs.  Prostate screening done 9/2017 ,will check PSA with next set of labs

## 2019-11-12 ENCOUNTER — PATIENT MESSAGE (OUTPATIENT)
Dept: ADMINISTRATIVE | Facility: OTHER | Age: 59
End: 2019-11-12

## 2019-12-02 RX ORDER — CALCIUM CITRATE/VITAMIN D3 200MG-6.25
TABLET ORAL
Qty: 1150 STRIP | Refills: 11 | Status: SHIPPED | OUTPATIENT
Start: 2019-12-02 | End: 2021-01-31 | Stop reason: SDUPTHER

## 2019-12-05 ENCOUNTER — PATIENT OUTREACH (OUTPATIENT)
Dept: ADMINISTRATIVE | Facility: HOSPITAL | Age: 59
End: 2019-12-05

## 2020-01-24 ENCOUNTER — PATIENT OUTREACH (OUTPATIENT)
Dept: ADMINISTRATIVE | Facility: HOSPITAL | Age: 60
End: 2020-01-24

## 2020-01-24 ENCOUNTER — TELEPHONE (OUTPATIENT)
Dept: ADMINISTRATIVE | Facility: HOSPITAL | Age: 60
End: 2020-01-24

## 2020-01-31 ENCOUNTER — LAB VISIT (OUTPATIENT)
Dept: LAB | Facility: HOSPITAL | Age: 60
End: 2020-01-31
Attending: FAMILY MEDICINE
Payer: MEDICAID

## 2020-01-31 LAB
ALBUMIN SERPL BCP-MCNC: 4 G/DL (ref 3.5–5.2)
ALP SERPL-CCNC: 85 U/L (ref 55–135)
ALT SERPL W/O P-5'-P-CCNC: 14 U/L (ref 10–44)
ANION GAP SERPL CALC-SCNC: 9 MMOL/L (ref 8–16)
AST SERPL-CCNC: 12 U/L (ref 10–40)
BILIRUB SERPL-MCNC: 0.5 MG/DL (ref 0.1–1)
BUN SERPL-MCNC: 13 MG/DL (ref 8–23)
CALCIUM SERPL-MCNC: 9.4 MG/DL (ref 8.7–10.5)
CHLORIDE SERPL-SCNC: 100 MMOL/L (ref 95–110)
CHOLEST SERPL-MCNC: 219 MG/DL (ref 120–199)
CHOLEST/HDLC SERPL: 5.2 {RATIO} (ref 2–5)
CO2 SERPL-SCNC: 27 MMOL/L (ref 23–29)
CREAT SERPL-MCNC: 0.9 MG/DL (ref 0.5–1.4)
EST. GFR  (AFRICAN AMERICAN): >60 ML/MIN/1.73 M^2
EST. GFR  (NON AFRICAN AMERICAN): >60 ML/MIN/1.73 M^2
ESTIMATED AVG GLUCOSE: 194 MG/DL (ref 68–131)
GLUCOSE SERPL-MCNC: 151 MG/DL (ref 70–110)
HBA1C MFR BLD HPLC: 8.4 % (ref 4–5.6)
HDLC SERPL-MCNC: 42 MG/DL (ref 40–75)
HDLC SERPL: 19.2 % (ref 20–50)
LDLC SERPL CALC-MCNC: 127.2 MG/DL (ref 63–159)
NONHDLC SERPL-MCNC: 177 MG/DL
POTASSIUM SERPL-SCNC: 4.3 MMOL/L (ref 3.5–5.1)
PROT SERPL-MCNC: 7.5 G/DL (ref 6–8.4)
SODIUM SERPL-SCNC: 136 MMOL/L (ref 136–145)
TRIGL SERPL-MCNC: 249 MG/DL (ref 30–150)

## 2020-01-31 PROCEDURE — 36415 COLL VENOUS BLD VENIPUNCTURE: CPT | Mod: PN

## 2020-01-31 PROCEDURE — 80061 LIPID PANEL: CPT

## 2020-01-31 PROCEDURE — 80053 COMPREHEN METABOLIC PANEL: CPT

## 2020-01-31 PROCEDURE — 83036 HEMOGLOBIN GLYCOSYLATED A1C: CPT

## 2020-02-07 ENCOUNTER — OFFICE VISIT (OUTPATIENT)
Dept: FAMILY MEDICINE | Facility: CLINIC | Age: 60
End: 2020-02-07
Payer: MEDICAID

## 2020-02-07 VITALS
WEIGHT: 158.06 LBS | DIASTOLIC BLOOD PRESSURE: 76 MMHG | HEART RATE: 77 BPM | BODY MASS INDEX: 25.4 KG/M2 | TEMPERATURE: 98 F | HEIGHT: 66 IN | SYSTOLIC BLOOD PRESSURE: 134 MMHG | OXYGEN SATURATION: 99 %

## 2020-02-07 DIAGNOSIS — I10 ESSENTIAL HYPERTENSION: ICD-10-CM

## 2020-02-07 DIAGNOSIS — E78.2 MIXED HYPERLIPIDEMIA: ICD-10-CM

## 2020-02-07 DIAGNOSIS — I25.10 CORONARY ARTERY DISEASE, ANGINA PRESENCE UNSPECIFIED, UNSPECIFIED VESSEL OR LESION TYPE, UNSPECIFIED WHETHER NATIVE OR TRANSPLANTED HEART: ICD-10-CM

## 2020-02-07 DIAGNOSIS — K51.919 ULCERATIVE COLITIS WITH COMPLICATION, UNSPECIFIED LOCATION: ICD-10-CM

## 2020-02-07 PROCEDURE — 99999 PR PBB SHADOW E&M-EST. PATIENT-LVL III: ICD-10-PCS | Mod: PBBFAC,,, | Performed by: FAMILY MEDICINE

## 2020-02-07 PROCEDURE — 99213 OFFICE O/P EST LOW 20 MIN: CPT | Mod: PBBFAC,PO | Performed by: FAMILY MEDICINE

## 2020-02-07 PROCEDURE — 99214 PR OFFICE/OUTPT VISIT, EST, LEVL IV, 30-39 MIN: ICD-10-PCS | Mod: S$PBB,,, | Performed by: FAMILY MEDICINE

## 2020-02-07 PROCEDURE — 99999 PR PBB SHADOW E&M-EST. PATIENT-LVL III: CPT | Mod: PBBFAC,,, | Performed by: FAMILY MEDICINE

## 2020-02-07 PROCEDURE — 99214 OFFICE O/P EST MOD 30 MIN: CPT | Mod: S$PBB,,, | Performed by: FAMILY MEDICINE

## 2020-02-07 NOTE — PROGRESS NOTES
(Portions of this note were dictated using voice recognition software and may contain dictation related errors in spelling/grammar/syntax not found on text review)    CC:   No chief complaint on file.      HPI: 59 y.o. male last visit November 2019.  Here for 3 month follow-up.    Diabetes, on metforminXR 500 2 pills am/2 pill pm.  glipizide 5 mg twice a day.   last A1c uncontrolled.  Tried to switch him to weekly G LP 1 agonist with Trulicity but apparently this was not covered and recommendation was to try bydureon, Victoza, or Byetta 1st.  Tried later by to do Bydureon weekly but apparently had some local allergic reaction with injections.  Had advised on potential of Victoza although he did not seem interested in doing a daily injection.  Started Jardiance 10 mg daily. Last a1c not controlled.      Does not take aspirin because of ALLERGY.    eye exam : appt coming up today  Sees a dentist regularly.    No neuropathy symptoms     CAD:  Presentation march 2017 with angina. CAD on angio, no revasc ability. Medically managed with statin with lipitor 80 mg daily, plavix, metoprolol suc 25 daily, losartan 100 mg daily, imdur 30 mg daily.  Had a nuclear stress test done in September 2018, EKG portion was positive, the however LV EF of 54%, imaging showed no significant myocardial ischemia or injury .  Last cardiology visit was on 10/24/2019     Hypertension on losartan 100 mg daily,metoprolol 50 mg daily,Amlodipine 5 mg daily .  BP better on recheck today      Ulcerative colitis, prior followed by GI. Currently not taking mesalamine (asymptomatic at this time)    Omeprazole PRN for GERD sx        Past Medical History:   Diagnosis Date    Coronary artery disease involving native coronary artery of native heart without angina pectoris 7/29/2017    Diabetes mellitus, type II     HTN (hypertension)     Mixed hyperlipidemia 10/24/2019    Ulcerative colitis        Past Surgical History:   Procedure Laterality Date     CATARACT EXTRACTION Bilateral     COLONOSCOPY N/A 2016    Procedure: COLONOSCOPY;  Surgeon: Aristeo Lynn Jr., MD;  Location: [facility];  Service: Endoscopy;  Laterality: N/A;       Family History   Problem Relation Age of Onset    Coronary artery disease Mother 75    Coronary artery disease Father 55    Coronary artery disease Brother 59    Diabetes Brother     Prostate cancer Neg Hx     Colon cancer Neg Hx        Social History     Socioeconomic History    Marital status:      Spouse name: Not on file    Number of children: Not on file    Years of education: Not on file    Highest education level: Not on file   Occupational History    Not on file   Social Needs    Financial resource strain: Not very hard    Food insecurity:     Worry: Never true     Inability: Never true    Transportation needs:     Medical: No     Non-medical: No   Tobacco Use    Smoking status: Former Smoker     Last attempt to quit: 3/16/2008     Years since quittin.9    Smokeless tobacco: Former User   Substance and Sexual Activity    Alcohol use: Not Currently     Alcohol/week: 0.0 standard drinks     Frequency: Monthly or less     Drinks per session: 1 or 2     Binge frequency: Never     Comment: occasionally    Drug use: No    Sexual activity: Yes   Lifestyle    Physical activity:     Days per week: 3 days     Minutes per session: 30 min    Stress: Not on file   Relationships    Social connections:     Talks on phone: Once a week     Gets together: Twice a week     Attends [employer] service: More than 4 times per year     Active member of club or organization: Patient refused     Attends meetings of clubs or organizations: Patient refused     Relationship status:    Other Topics Concern    Not on file   Social History Narrative    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     No recent exercise    Healthy diet  (veg)     Lab Results   Component Value Date    WBC 7.50 2019    HGB 11.7 (L) 2019     HCT 36.0 (L) 11/04/2019    MCV 82 11/04/2019     11/04/2019    CHOL 219 (H) 01/31/2020    TRIG 249 (H) 01/31/2020    HDL 42 01/31/2020    ALT 14 01/31/2020    AST 12 01/31/2020    BILITOT 0.5 01/31/2020    ALKPHOS 85 01/31/2020     01/31/2020    K 4.3 01/31/2020     01/31/2020    CREATININE 0.9 01/31/2020    ESTGFRAFRICA >60.0 01/31/2020    EGFRNONAA >60.0 01/31/2020    CALCIUM 9.4 01/31/2020    ALBUMIN 4.0 01/31/2020    BUN 13 01/31/2020    CO2 27 01/31/2020    TSH 1.930 11/04/2019    PSA 0.60 11/04/2019    INR 1.1 03/09/2017    HGBA1C 8.4 (H) 01/31/2020    MICALBCREAT Unable to calculate 11/04/2019    LDLCALC 127.2 01/31/2020     (H) 01/31/2020               Hemoglobin A1C (%)   Date Value   01/31/2020 8.4 (H)   11/04/2019 8.1 (H)   11/05/2018 8.4 (H)   12/05/2017 7.7 (H)   09/06/2017 9.3 (H)   02/23/2017 9.5 (H)      Answers for HPI/ROS submitted by the patient on 2/7/2020   activity change: No  unexpected weight change: No  neck pain: No  hearing loss: No  rhinorrhea: No  trouble swallowing: No  eye discharge: No  visual disturbance: No  chest tightness: No  wheezing: No  chest pain: No  palpitations: No  blood in stool: No  constipation: No  vomiting: No  diarrhea: No  polydipsia: No  polyuria: No  difficulty urinating: No  urgency: No  hematuria: No  joint swelling: No  arthralgias: No  headaches: No  weakness: No  confusion: No  dysphoric mood: No      Vital signs reviewed  PE:   APPEARANCE: Well nourished, well developed, in no acute distress.    HEAD: Normocephalic, atraumatic.  EYES:  Conjunctivae noninjected.  NECK: Supple with no cervical lymphadenopathy.  No carotid bruits.  No thyromegaly  CHEST: Good inspiratory effort. Lungs clear to auscultation with no wheezes or crackles.  CARDIOVASCULAR: Normal S1, S2. No rubs, murmurs, or gallops.  ABDOMEN: Bowel sounds normal. Not distended. Soft. No tenderness or masses. No organomegaly. No evidence of inguinal hernia bilaterally  or ventral hernia  EXTREMITIES: No edema, cyanosis, or clubbing.    DIABETIC FOOT EXAM:  Up-to-date November 2019        IMPRESSION  1. Uncontrolled type 2 diabetes mellitus without complication, without long-term current use of insulin    2. Coronary artery disease, angina presence unspecified, unspecified vessel or lesion type, unspecified whether native or transplanted heart    3. Essential hypertension    4. Ulcerative colitis with complication, unspecified location    5. Mixed hyperlipidemia            PLAN  Diabetes health maintenance:  -- advise regular and consistent glucose monitoring and medication compliance.  -- advise daily foot checks  -- advise yearly ophthalmologic exams  -- advise adequate dietary and exercise modification  -- advise regular dental visits  -- advise daily low-dose aspirin use if patient is not on other anticoagulants and there are no other contraindications.  -- Medication management:  Continue metformin 2000 mg daily, glipizide 5 mg b.i.d..  Wishes to try something other than G LP injections.  Will increase Jardiance to 25 mg daily,  daily.  Later could potentially increase his glipizide up to 10 b.i.d..  Furthermore in the future could consider re-application for Trulicity since he was not able to tolerate Bydureon.    HTN:  Stable.  Continue current therapy    CAD.  Asymptomatic.    Would recommend continued maximization of risk factor control.  Continue Plavix.  Does not take aspirin secondary to allergy.  Continue statin    Ulcerative colitis:  Not clinically active at this time      Labs below in 3 months with visit to follow  Orders Placed This Encounter   Procedures    CBC auto differential    Comprehensive metabolic panel    Lipid panel    Hemoglobin A1c       HEALTH SCREENINGS   Immunizations:  Pneumovax 2015  Tetanus around 2013   flu:  utd   zoster:  Can get at pharmacy    Age/Gender Appropriate screenings:  Colonoscopy in 2016, polyps removed, scattered  rt maynorc 5 yrs.  Prostate screening done 9/2017 ,will check PSA with next set of labs

## 2020-02-17 ENCOUNTER — PATIENT MESSAGE (OUTPATIENT)
Dept: FAMILY MEDICINE | Facility: CLINIC | Age: 60
End: 2020-02-17

## 2020-02-17 DIAGNOSIS — R30.0 DYSURIA: Primary | ICD-10-CM

## 2020-02-17 NOTE — TELEPHONE ENCOUNTER
Orders Placed This Encounter   Procedures    Urine culture    Urinalysis     Needs UA and culture ordered

## 2020-02-20 ENCOUNTER — LAB VISIT (OUTPATIENT)
Dept: LAB | Facility: HOSPITAL | Age: 60
End: 2020-02-20
Attending: FAMILY MEDICINE
Payer: MEDICAID

## 2020-02-20 DIAGNOSIS — R30.0 DYSURIA: ICD-10-CM

## 2020-02-20 LAB
BACTERIA #/AREA URNS AUTO: NORMAL /HPF
BILIRUB UR QL STRIP: NEGATIVE
CLARITY UR REFRACT.AUTO: CLEAR
COLOR UR AUTO: COLORLESS
GLUCOSE UR QL STRIP: ABNORMAL
HGB UR QL STRIP: NEGATIVE
KETONES UR QL STRIP: NEGATIVE
LEUKOCYTE ESTERASE UR QL STRIP: NEGATIVE
MICROSCOPIC COMMENT: NORMAL
NITRITE UR QL STRIP: NEGATIVE
PH UR STRIP: 7 [PH] (ref 5–8)
PROT UR QL STRIP: NEGATIVE
RBC #/AREA URNS AUTO: 0 /HPF (ref 0–4)
SP GR UR STRIP: 1 (ref 1–1.03)
URN SPEC COLLECT METH UR: ABNORMAL
WBC #/AREA URNS AUTO: 0 /HPF (ref 0–5)
YEAST UR QL AUTO: NORMAL

## 2020-02-20 PROCEDURE — 81001 URINALYSIS AUTO W/SCOPE: CPT

## 2020-02-20 PROCEDURE — 87086 URINE CULTURE/COLONY COUNT: CPT

## 2020-02-22 LAB — BACTERIA UR CULT: NO GROWTH

## 2020-02-24 ENCOUNTER — PATIENT MESSAGE (OUTPATIENT)
Dept: FAMILY MEDICINE | Facility: CLINIC | Age: 60
End: 2020-02-24

## 2020-02-24 NOTE — TELEPHONE ENCOUNTER
Dear Michoacano Lind    Your recent urine test did not show any sign of infection.  You do have some sugar in the urine which is typical for diabetes especially if you're on Jardiance.  Hopefully the symptoms are improving somewhat if you're still on it.  Let me know if the symptoms are getting any worse.  If they are slowly getting better, I think it is reasonable to continue on the medication especially since the culture was negative.    Facundo Alexis MD

## 2020-05-08 ENCOUNTER — LAB VISIT (OUTPATIENT)
Dept: LAB | Facility: HOSPITAL | Age: 60
End: 2020-05-08
Attending: FAMILY MEDICINE
Payer: MEDICAID

## 2020-05-08 LAB
ALBUMIN SERPL BCP-MCNC: 4 G/DL (ref 3.5–5.2)
ALP SERPL-CCNC: 83 U/L (ref 55–135)
ALT SERPL W/O P-5'-P-CCNC: 17 U/L (ref 10–44)
ANION GAP SERPL CALC-SCNC: 10 MMOL/L (ref 8–16)
AST SERPL-CCNC: 12 U/L (ref 10–40)
BASOPHILS # BLD AUTO: 0.09 K/UL (ref 0–0.2)
BASOPHILS NFR BLD: 0.9 % (ref 0–1.9)
BILIRUB SERPL-MCNC: 0.5 MG/DL (ref 0.1–1)
BUN SERPL-MCNC: 9 MG/DL (ref 8–23)
CALCIUM SERPL-MCNC: 9.4 MG/DL (ref 8.7–10.5)
CHLORIDE SERPL-SCNC: 101 MMOL/L (ref 95–110)
CHOLEST SERPL-MCNC: 194 MG/DL (ref 120–199)
CHOLEST/HDLC SERPL: 4 {RATIO} (ref 2–5)
CO2 SERPL-SCNC: 25 MMOL/L (ref 23–29)
CREAT SERPL-MCNC: 0.9 MG/DL (ref 0.5–1.4)
DIFFERENTIAL METHOD: ABNORMAL
EOSINOPHIL # BLD AUTO: 0.3 K/UL (ref 0–0.5)
EOSINOPHIL NFR BLD: 3.5 % (ref 0–8)
ERYTHROCYTE [DISTWIDTH] IN BLOOD BY AUTOMATED COUNT: 13.7 % (ref 11.5–14.5)
EST. GFR  (AFRICAN AMERICAN): >60 ML/MIN/1.73 M^2
EST. GFR  (NON AFRICAN AMERICAN): >60 ML/MIN/1.73 M^2
ESTIMATED AVG GLUCOSE: 209 MG/DL (ref 68–131)
GLUCOSE SERPL-MCNC: 150 MG/DL (ref 70–110)
HBA1C MFR BLD HPLC: 8.9 % (ref 4–5.6)
HCT VFR BLD AUTO: 39.3 % (ref 40–54)
HDLC SERPL-MCNC: 48 MG/DL (ref 40–75)
HDLC SERPL: 24.7 % (ref 20–50)
HGB BLD-MCNC: 12 G/DL (ref 14–18)
IMM GRANULOCYTES # BLD AUTO: 0.02 K/UL (ref 0–0.04)
IMM GRANULOCYTES NFR BLD AUTO: 0.2 % (ref 0–0.5)
LDLC SERPL CALC-MCNC: 104.8 MG/DL (ref 63–159)
LYMPHOCYTES # BLD AUTO: 2.3 K/UL (ref 1–4.8)
LYMPHOCYTES NFR BLD: 23.4 % (ref 18–48)
MCH RBC QN AUTO: 26.4 PG (ref 27–31)
MCHC RBC AUTO-ENTMCNC: 30.5 G/DL (ref 32–36)
MCV RBC AUTO: 87 FL (ref 82–98)
MONOCYTES # BLD AUTO: 0.7 K/UL (ref 0.3–1)
MONOCYTES NFR BLD: 7.2 % (ref 4–15)
NEUTROPHILS # BLD AUTO: 6.2 K/UL (ref 1.8–7.7)
NEUTROPHILS NFR BLD: 64.8 % (ref 38–73)
NONHDLC SERPL-MCNC: 146 MG/DL
NRBC BLD-RTO: 0 /100 WBC
PLATELET # BLD AUTO: 274 K/UL (ref 150–350)
PMV BLD AUTO: 11.4 FL (ref 9.2–12.9)
POTASSIUM SERPL-SCNC: 4.2 MMOL/L (ref 3.5–5.1)
PROT SERPL-MCNC: 7.7 G/DL (ref 6–8.4)
RBC # BLD AUTO: 4.54 M/UL (ref 4.6–6.2)
SODIUM SERPL-SCNC: 136 MMOL/L (ref 136–145)
TRIGL SERPL-MCNC: 206 MG/DL (ref 30–150)
WBC # BLD AUTO: 9.63 K/UL (ref 3.9–12.7)

## 2020-05-08 PROCEDURE — 83036 HEMOGLOBIN GLYCOSYLATED A1C: CPT

## 2020-05-08 PROCEDURE — 80061 LIPID PANEL: CPT

## 2020-05-08 PROCEDURE — 85025 COMPLETE CBC W/AUTO DIFF WBC: CPT

## 2020-05-08 PROCEDURE — 36415 COLL VENOUS BLD VENIPUNCTURE: CPT | Mod: PN

## 2020-05-08 PROCEDURE — 80053 COMPREHEN METABOLIC PANEL: CPT

## 2020-05-13 ENCOUNTER — TELEPHONE (OUTPATIENT)
Dept: FAMILY MEDICINE | Facility: CLINIC | Age: 60
End: 2020-05-13

## 2020-05-13 NOTE — TELEPHONE ENCOUNTER
----- Message from Sara Arellano sent at 5/13/2020 12:03 PM CDT -----  Contact: Self [phone]  Patient would like to speak with you about changing his appointment to a virtual. Please advise

## 2020-05-15 ENCOUNTER — OFFICE VISIT (OUTPATIENT)
Dept: FAMILY MEDICINE | Facility: CLINIC | Age: 60
End: 2020-05-15
Payer: MEDICAID

## 2020-05-15 DIAGNOSIS — I25.10 CORONARY ARTERY DISEASE, ANGINA PRESENCE UNSPECIFIED, UNSPECIFIED VESSEL OR LESION TYPE, UNSPECIFIED WHETHER NATIVE OR TRANSPLANTED HEART: ICD-10-CM

## 2020-05-15 DIAGNOSIS — I10 ESSENTIAL HYPERTENSION: ICD-10-CM

## 2020-05-15 PROCEDURE — 99214 OFFICE O/P EST MOD 30 MIN: CPT | Mod: 95,,, | Performed by: FAMILY MEDICINE

## 2020-05-15 PROCEDURE — 99214 PR OFFICE/OUTPT VISIT, EST, LEVL IV, 30-39 MIN: ICD-10-PCS | Mod: 95,,, | Performed by: FAMILY MEDICINE

## 2020-05-15 RX ORDER — GLIPIZIDE 10 MG/1
10 TABLET ORAL
Qty: 60 TABLET | Refills: 11 | Status: SHIPPED | OUTPATIENT
Start: 2020-05-15 | End: 2020-10-20 | Stop reason: SDUPTHER

## 2020-05-15 RX ORDER — ROSUVASTATIN CALCIUM 40 MG/1
40 TABLET, COATED ORAL NIGHTLY
Qty: 90 TABLET | Refills: 3 | Status: ON HOLD | OUTPATIENT
Start: 2020-05-15 | End: 2021-05-03 | Stop reason: CLARIF

## 2020-05-15 NOTE — PROGRESS NOTES
The patient location is:  work  The chief complaint leading to consultation is:  Diabetes follow-up  Visit type: audiovisual  Total time spent with patient:  15 min  Each patient to whom he or she provides medical services by telemedicine is:  (1) informed of the relationship between the physician and patient and the respective role of any other health care provider with respect to management of the patient; and (2) notified that he or she may decline to receive medical services by telemedicine and may withdraw from such care at any time.        (Portions of this note were dictated using voice recognition software and may contain dictation related errors in spelling/grammar/syntax not found on text review)    CC:   Diabetes follow-up    HPI: 60 y.o. male last visit February 2020  Here for 3 month follow-up.    Diabetes, on metforminXR 500 2 pills am/2 pill pm.  glipizide 5 mg twice a day.   last A1c uncontrolled.  Tried to switch him to weekly G LP 1 agonist with Trulicity but apparently this was not covered and recommendation was to try bydureon, Victoza, or Byetta 1st.  Tried later by to do Bydureon weekly but apparently had some local allergic reaction with injections.  Had advised on potential of Victoza although he did not seem interested in doing a daily injection.  Started Jardiance 10 mg daily which was increased to 25 secondary to high A1c. Last a1c not controlled.  However he states that because of insurance issues, he was only able to get the 25 mg Jardiance picked up last week.  Has not had any issues with the higher dose.     Does not take aspirin because of ALLERGY.    eye exam :  Sees a dentist regularly.    No neuropathy symptoms     CAD:  Presentation march 2017 with angina. CAD on angio, no revasc ability. Medically managed with statin with lipitor 80 mg daily, plavix, metoprolol suc 25 daily, losartan 100 mg daily, imdur 30 mg daily.  Had a nuclear stress test done in September 2018, EKG portion  was positive, the however LV EF of 54%, imaging showed no significant myocardial ischemia or injury .  Last cardiology visit was on 10/24/2019     Hypertension on losartan 100 mg daily,metoprolol 50 mg daily,Amlodipine 5 mg daily .        Ulcerative colitis, prior followed by GI. Currently not taking mesalamine (asymptomatic at this time)    Omeprazole PRN for GERD sx        Past Medical History:   Diagnosis Date    Coronary artery disease involving native coronary artery of native heart without angina pectoris 2017    Diabetes mellitus, type II     HTN (hypertension)     Mixed hyperlipidemia 10/24/2019    Ulcerative colitis        Past Surgical History:   Procedure Laterality Date    CATARACT EXTRACTION Bilateral     COLONOSCOPY N/A 2016    Procedure: COLONOSCOPY;  Surgeon: Aristeo Lynn Jr., MD;  Location: [facility];  Service: Endoscopy;  Laterality: N/A;       Family History   Problem Relation Age of Onset    Coronary artery disease Mother 75    Coronary artery disease Father 55    Coronary artery disease Brother 59    Diabetes Brother     Prostate cancer Neg Hx     Colon cancer Neg Hx        Social History     Socioeconomic History    Marital status:      Spouse name: Not on file    Number of children: Not on file    Years of education: Not on file    Highest education level: Not on file   Occupational History    Not on file   Social Needs    Financial resource strain: Not very hard    Food insecurity:     Worry: Never true     Inability: Never true    Transportation needs:     Medical: No     Non-medical: No   Tobacco Use    Smoking status: Former Smoker     Last attempt to quit: 3/16/2008     Years since quittin.1    Smokeless tobacco: Former User   Substance and Sexual Activity    Alcohol use: Not Currently     Alcohol/week: 0.0 standard drinks     Frequency: Monthly or less     Drinks per session: 1 or 2     Binge frequency: Never     Comment:  occasionally    Drug use: No    Sexual activity: Yes   Lifestyle    Physical activity:     Days per week: 3 days     Minutes per session: 30 min    Stress: Not on file   Relationships    Social connections:     Talks on phone: Once a week     Gets together: Twice a week     Attends Religion service: More than 4 times per year     Active member of club or organization: Patient refused     Attends meetings of clubs or organizations: Patient refused     Relationship status:    Other Topics Concern    Not on file   Social History Narrative    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     No recent exercise    Healthy diet  (veg)     Lab Results   Component Value Date    WBC 9.63 05/08/2020    HGB 12.0 (L) 05/08/2020    HCT 39.3 (L) 05/08/2020    MCV 87 05/08/2020     05/08/2020    CHOL 194 05/08/2020    TRIG 206 (H) 05/08/2020    HDL 48 05/08/2020    ALT 17 05/08/2020    AST 12 05/08/2020    BILITOT 0.5 05/08/2020    ALKPHOS 83 05/08/2020     05/08/2020    K 4.2 05/08/2020     05/08/2020    CREATININE 0.9 05/08/2020    ESTGFRAFRICA >60.0 05/08/2020    EGFRNONAA >60.0 05/08/2020    CALCIUM 9.4 05/08/2020    ALBUMIN 4.0 05/08/2020    BUN 9 05/08/2020    CO2 25 05/08/2020    TSH 1.930 11/04/2019    PSA 0.60 11/04/2019    INR 1.1 03/09/2017    HGBA1C 8.9 (H) 05/08/2020    MICALBCREAT Unable to calculate 11/04/2019    LDLCALC 104.8 05/08/2020     (H) 05/08/2020               Hemoglobin A1C (%)   Date Value   05/08/2020 8.9 (H)   01/31/2020 8.4 (H)   11/04/2019 8.1 (H)   11/05/2018 8.4 (H)   12/05/2017 7.7 (H)   09/06/2017 9.3 (H)      Answers for HPI/ROS submitted by the patient on 2/7/2020   activity change: No  unexpected weight change: No  neck pain: No  hearing loss: No  rhinorrhea: No  trouble swallowing: No  eye discharge: No  visual disturbance: No  chest tightness: No  wheezing: No  chest pain: No  palpitations: No  blood in stool: No  constipation: No  vomiting: No  diarrhea: No  polydipsia:  No  polyuria: No  difficulty urinating: No  urgency: No  hematuria: No  joint swelling: No  arthralgias: No  headaches: No  weakness: No  confusion: No  dysphoric mood: No      Vital signs reviewed  PE:   APPEARANCE: Well nourished, well developed, in no acute distress.    HEAD: Normocephalic, atraumatic.  EYES:  Conjunctivae noninjected.  NECK: Supple with no cervical lymphadenopathy.  No carotid bruits.  No thyromegaly  CHEST: Good inspiratory effort. Lungs clear to auscultation with no wheezes or crackles.  CARDIOVASCULAR: Normal S1, S2. No rubs, murmurs, or gallops.  ABDOMEN: Bowel sounds normal. Not distended. Soft. No tenderness or masses. No organomegaly. No evidence of inguinal hernia bilaterally or ventral hernia  EXTREMITIES: No edema, cyanosis, or clubbing.    DIABETIC FOOT EXAM:  Up-to-date November 2019        IMPRESSION  1. Uncontrolled type 2 diabetes mellitus without complication, without long-term current use of insulin    2. Coronary artery disease, angina presence unspecified, unspecified vessel or lesion type, unspecified whether native or transplanted heart    3. Essential hypertension            PLAN  Diabetes health maintenance:  -- advise regular and consistent glucose monitoring and medication compliance.  -- advise daily foot checks  -- advise yearly ophthalmologic exams  -- advise adequate dietary and exercise modification  -- advise regular dental visits  -- advise daily low-dose aspirin use if patient is not on other anticoagulants and there are no other contraindications.  -- Medication management:  Continue metformin 2000 mg daily, increase glipizide to 10 mg b.i.d..  Continue Jardiance 25 mg daily.    Furthermore in the future could consider re-application for Trulicity since he was not able to tolerate Bydureon and was not to take a daily Victoza injection (prior Trulicity was submitted but denies secondary to not having tried the Bydureon or Victoza 1st).    HTN:  Stable.  Continue  current therapy    CAD.  Asymptomatic.  Lipid suboptimal on Lipitor 80 mg daily. change over to Crestor 40 mg daily.  Would recommend continued maximization of risk factor control.  Continue Plavix.  Does not take aspirin secondary to allergy.      Ulcerative colitis:  Not clinically active at this time      Labs below in 3 months with visit to follow  No orders of the defined types were placed in this encounter.      HEALTH SCREENINGS   Immunizations:  Pneumovax 2015  Tetanus around 2013   flu:  utd   zoster:  Can get at pharmacy    Age/Gender Appropriate screenings:  Colonoscopy in 2016, polyps removed, scattered pseudopolyps, rtc 5 yrs.  Prostate screening done  November 2019

## 2020-07-27 ENCOUNTER — PATIENT OUTREACH (OUTPATIENT)
Dept: ADMINISTRATIVE | Facility: HOSPITAL | Age: 60
End: 2020-07-27

## 2020-07-27 NOTE — LETTER
AUTHORIZATION FOR RELEASE OF   CONFIDENTIAL INFORMATION    Dear Dr. Rapp,    We are seeing Michoacano Lind, date of birth 1960, in the clinic at [facility]. Michoacano Lind has an outstanding lab/procedure at the time we reviewed his chart. In order to help keep his health information updated, he has authorized us to request the following medical record(s):            ( X )  EYE EXAM- Diabetic                Please fax records to us at [phone].     Attention: Maranda Rivera     If you have any questions, please contact me at [phone].          Patient Name: Michoacano Lind  : 1960  Patient Phone #: [phone]

## 2020-08-25 RX ORDER — LOSARTAN POTASSIUM 100 MG/1
100 TABLET ORAL DAILY
Qty: 90 TABLET | Refills: 3 | Status: SHIPPED | OUTPATIENT
Start: 2020-08-25 | End: 2021-07-05 | Stop reason: SDUPTHER

## 2020-09-01 ENCOUNTER — OFFICE VISIT (OUTPATIENT)
Dept: FAMILY MEDICINE | Facility: CLINIC | Age: 60
End: 2020-09-01
Payer: MEDICAID

## 2020-09-01 VITALS
SYSTOLIC BLOOD PRESSURE: 130 MMHG | HEART RATE: 76 BPM | HEIGHT: 66 IN | OXYGEN SATURATION: 100 % | DIASTOLIC BLOOD PRESSURE: 70 MMHG | BODY MASS INDEX: 25.4 KG/M2 | WEIGHT: 158.06 LBS | TEMPERATURE: 98 F

## 2020-09-01 DIAGNOSIS — Z00.00 ROUTINE GENERAL MEDICAL EXAMINATION AT A HEALTH CARE FACILITY: Primary | ICD-10-CM

## 2020-09-01 DIAGNOSIS — I25.10 CORONARY ARTERY DISEASE, ANGINA PRESENCE UNSPECIFIED, UNSPECIFIED VESSEL OR LESION TYPE, UNSPECIFIED WHETHER NATIVE OR TRANSPLANTED HEART: ICD-10-CM

## 2020-09-01 DIAGNOSIS — M25.562 CHRONIC PAIN OF LEFT KNEE: ICD-10-CM

## 2020-09-01 DIAGNOSIS — G89.29 CHRONIC PAIN OF LEFT KNEE: ICD-10-CM

## 2020-09-01 DIAGNOSIS — Z12.11 COLON CANCER SCREENING: ICD-10-CM

## 2020-09-01 DIAGNOSIS — E78.2 MIXED HYPERLIPIDEMIA: ICD-10-CM

## 2020-09-01 DIAGNOSIS — K51.919 ULCERATIVE COLITIS WITH COMPLICATION, UNSPECIFIED LOCATION: ICD-10-CM

## 2020-09-01 DIAGNOSIS — I10 ESSENTIAL HYPERTENSION: ICD-10-CM

## 2020-09-01 PROCEDURE — 99999 PR PBB SHADOW E&M-EST. PATIENT-LVL IV: CPT | Mod: PBBFAC,,, | Performed by: FAMILY MEDICINE

## 2020-09-01 PROCEDURE — 99999 PR PBB SHADOW E&M-EST. PATIENT-LVL IV: ICD-10-PCS | Mod: PBBFAC,,, | Performed by: FAMILY MEDICINE

## 2020-09-01 PROCEDURE — 99214 OFFICE O/P EST MOD 30 MIN: CPT | Mod: PBBFAC,PO | Performed by: FAMILY MEDICINE

## 2020-09-01 PROCEDURE — 99396 PR PREVENTIVE VISIT,EST,40-64: ICD-10-PCS | Mod: S$PBB,,, | Performed by: FAMILY MEDICINE

## 2020-09-01 PROCEDURE — 99396 PREV VISIT EST AGE 40-64: CPT | Mod: S$PBB,,, | Performed by: FAMILY MEDICINE

## 2020-09-01 RX ORDER — NITROGLYCERIN 0.4 MG/1
0.4 TABLET SUBLINGUAL EVERY 5 MIN PRN
Qty: 30 TABLET | Refills: 1 | Status: SHIPPED | OUTPATIENT
Start: 2020-09-01 | End: 2024-01-12

## 2020-09-01 NOTE — PROGRESS NOTES
(Portions of this note were dictated using voice recognition software and may contain dictation related errors in spelling/grammar/syntax not found on text review)    CC:   Annual    HPI: 60 y.o. male     Diabetes, on metforminXR 500 2 pills am/2 pill pm.  glipizide 10 mg twice a day.   A1c uncontrolled.  Tried to switch him to weekly G LP 1 agonist with Trulicity but apparently this was not covered and recommendation was to try bydureon, Victoza, or Byetta 1st.  Tried later by to do Bydureon weekly but apparently had some local allergic reaction with injections.  Had advised on potential of Victoza although he did not seem interested in doing a daily injection.  Started Jardiance 10 mg daily which was increased to 25 secondary to high A1c.   He does feel like since going up to the 25 mg Jardiance his diabetes has been much better controlled at home.  Sugars from  in the morning.  Occasionally afternoon if he does not eat much he will get sugars into the 80 range or 70 range and will feel jittery.    Does not take aspirin because of ALLERGY.    eye exam :  Sees a dentist regularly.    No neuropathy symptoms     CAD:  Presentation march 2017 with angina. CAD on angio, no revasc ability. Medically managed with statin (was on Lipitor 80 but given suboptimal LDL switched over to Crestor 40), plavix, metoprolol suc 25 daily, losartan 100 mg daily, imdur 30 mg daily.  Had a nuclear stress test done in September 2018, EKG portion was positive, the however LV EF of 54%, imaging showed no significant myocardial ischemia or injury .  Last cardiology visit was on 10/24/2019     Hypertension on losartan 100 mg daily,metoprolol 50 mg daily,Amlodipine 5 mg daily .        Ulcerative colitis, prior followed by GI. Currently not taking mesalamine (asymptomatic at this time)    Omeprazole PRN for GERD sx    Has had some left knee pain, chronic in nature, no recent injury.  No swelling of the knee.  Does get stiff  sometimes.        Past Medical History:   Diagnosis Date    Coronary artery disease involving native coronary artery of native heart without angina pectoris 2017    Diabetes mellitus, type II     HTN (hypertension)     Mixed hyperlipidemia 10/24/2019    Ulcerative colitis        Past Surgical History:   Procedure Laterality Date    CATARACT EXTRACTION Bilateral 2013    COLONOSCOPY N/A 2016    Procedure: COLONOSCOPY;  Surgeon: Aristeo Lynn Jr., MD;  [facility];  Service: Endoscopy;  Laterality: N/A;       Family History   Problem Relation Age of Onset    Coronary artery disease Mother 75    Coronary artery disease Father 55    Coronary artery disease Brother 59    Diabetes Brother     Prostate cancer Neg Hx     Colon cancer Neg Hx        Social History     Socioeconomic History    Marital status:      Spouse name: Not on file    Number of children: Not on file    Years of education: Not on file    Highest education level: Not on file   Occupational History    Not on file   Social Needs    Financial resource strain: Not very hard    Food insecurity     Worry: Never true     Inability: Never true    Transportation needs     Medical: No     Non-medical: No   Tobacco Use    Smoking status: Former Smoker     Quit date: 3/16/2008     Years since quittin.4    Smokeless tobacco: Former User   Substance and Sexual Activity    Alcohol use: Not Currently     Alcohol/week: 0.0 standard drinks     Frequency: Monthly or less     Drinks per session: 1 or 2     Binge frequency: Never     Comment: occasionally    Drug use: No    Sexual activity: Yes   Lifestyle    Physical activity     Days per week: 3 days     Minutes per session: 30 min    Stress: Not on file   Relationships    Social connections     Talks on phone: Once a week     Gets together: Twice a week     Attends Mandaeism service: More than 4 times per year     Active member of club or organization: Patient  refused     Attends meetings of clubs or organizations: Patient refused     Relationship status:    Other Topics Concern    Not on file   Social History Narrative    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     No recent exercise    Healthy diet  (veg)     Lab Results   Component Value Date    WBC 9.63 05/08/2020    HGB 12.0 (L) 05/08/2020    HCT 39.3 (L) 05/08/2020    MCV 87 05/08/2020     05/08/2020    CHOL 194 05/08/2020    TRIG 206 (H) 05/08/2020    HDL 48 05/08/2020    ALT 17 05/08/2020    AST 12 05/08/2020    BILITOT 0.5 05/08/2020    ALKPHOS 83 05/08/2020     05/08/2020    K 4.2 05/08/2020     05/08/2020    CREATININE 0.9 05/08/2020    ESTGFRAFRICA >60.0 05/08/2020    EGFRNONAA >60.0 05/08/2020    CALCIUM 9.4 05/08/2020    ALBUMIN 4.0 05/08/2020    BUN 9 05/08/2020    CO2 25 05/08/2020    TSH 1.930 11/04/2019    PSA 0.60 11/04/2019    INR 1.1 03/09/2017    HGBA1C 8.9 (H) 05/08/2020    MICALBCREAT Unable to calculate 11/04/2019    LDLCALC 104.8 05/08/2020     (H) 05/08/2020               Hemoglobin A1C (%)   Date Value   05/08/2020 8.9 (H)   01/31/2020 8.4 (H)   11/04/2019 8.1 (H)   11/05/2018 8.4 (H)   12/05/2017 7.7 (H)   09/06/2017 9.3 (H)     Hemoglobin (g/dL)   Date Value   05/08/2020 12.0 (L)   11/04/2019 11.7 (L)   11/05/2018 11.7 (L)   09/06/2017 11.1 (L)   03/09/2017 12.2 (L)   02/23/2017 13.1 (L)     LDL Cholesterol (mg/dL)   Date Value   05/08/2020 104.8   01/31/2020 127.2   11/04/2019 54.4 (L)   11/05/2018 89.8   12/05/2017 86.6   09/06/2017 56.0 (L)      Answers for HPI/ROS submitted by the patient on 2/7/2020   activity change: No  unexpected weight change: No  neck pain: No  hearing loss: No  rhinorrhea: No  trouble swallowing: No  eye discharge: No  visual disturbance: No  chest tightness: No  wheezing: No  chest pain: No  palpitations: No  blood in stool: No  constipation: No  vomiting: No  diarrhea: No  polydipsia: No  polyuria: No  difficulty urinating: No  urgency:  No  hematuria: No  joint swelling: No  arthralgias: No  headaches: No  weakness: No  confusion: No  dysphoric mood: No      Vital signs reviewed  PE:   APPEARANCE: Well nourished, well developed, in no acute distress.    HEAD: Normocephalic, atraumatic.  EYES:  Conjunctivae noninjected.  NECK: Supple with no cervical lymphadenopathy.  No carotid bruits.  No thyromegaly  CHEST: Good inspiratory effort. Lungs clear to auscultation with no wheezes or crackles.  CARDIOVASCULAR: Normal S1, S2. No rubs, murmurs, or gallops.  ABDOMEN: Bowel sounds normal. Not distended. Soft. No tenderness or masses. No organomegaly. No evidence of inguinal hernia bilaterally or ventral hernia  EXTREMITIES: No edema, cyanosis, or clubbing.    MSK:  Left knee shows no effusion.  No joint line tenderness.  No valgus or varus stress pain or laxity.  Negative Lachman's test.  Negative Jazzy's test.  Does have a positive Zak's test.  Hip flexor strength 5/5 bilaterally.  Quad strength 4+/5 on the left and 5/5 on the right.  Hamstring strength 4+/5 bilaterally.  5/5 ft dorsiflexor and foot plantar flexor strength  DIABETIC FOOT EXAM:  Up-to-date November 2019        IMPRESSION  1. Routine general medical examination at a health care facility    2. Uncontrolled type 2 diabetes mellitus without complication, without long-term current use of insulin    3. Coronary artery disease, angina presence unspecified, unspecified vessel or lesion type, unspecified whether native or transplanted heart    4. Essential hypertension    5. Mixed hyperlipidemia    6. Ulcerative colitis with complication, unspecified location    7. Chronic pain of left knee    8. Colon cancer screening            PLAN  Diabetes health maintenance:  -- advise regular and consistent glucose monitoring and medication compliance.  -- advise daily foot checks  -- advise yearly ophthalmologic exams  -- advise adequate dietary and exercise modification  -- advise regular dental  visits  -- advise daily low-dose aspirin use if patient is not on other anticoagulants and there are no other contraindications.  -- Medication management:  Continue metformin 2000 mg daily, increase glipizide to 10 mg b.i.d..  Continue Jardiance 25 mg daily.  Check labs.  In the future if uncontrolled status.  Advised did take the glipizide with food always to avoid hypoglycemia.  could consider re-application for Trulicity since he was not able to tolerate Bydureon and was not to take a daily Victoza injection (prior Trulicity was submitted but denies secondary to not having tried the Bydureon or Victoza 1st).    HTN:  Stable.  Continue current therapy    CAD.  Asymptomatic.  Refilled nitroglycerin per request.  Continue current therapy including Crestor.  Check labs      Ulcerative colitis:  Not clinically active at this time    Knee pain:  Suspect a degree of OA.  Advised quad and hamstring exercises which have been provided.  Tylenol as needed for pain.  If more progressive can consider x-ray, other therapies including physical therapy, intra-articular steroids for pain control if diabetes is controlled    Orders Placed This Encounter   Procedures    CBC auto differential    Comprehensive metabolic panel    Lipid Panel    TSH    Microalbumin/creatinine urine ratio    Hemoglobin A1C    HIV 1/2 Ag/Ab (4th Gen)         HEALTH SCREENINGS   Immunizations:  Pneumovax 2015  Tetanus around 2013   flu:  utd   zoster:  Can get at pharmacy    Age/Gender Appropriate screenings:  Colonoscopy in 2016, polyps removed, scattered pseudopolyps, rtc four years, colonoscopy ordered  Prostate screening done  November 2019

## 2020-09-30 ENCOUNTER — TELEPHONE (OUTPATIENT)
Dept: GASTROENTEROLOGY | Facility: CLINIC | Age: 60
End: 2020-09-30

## 2020-09-30 DIAGNOSIS — Z01.818 PREOP EXAMINATION: ICD-10-CM

## 2020-09-30 NOTE — TELEPHONE ENCOUNTER
Pt is scheduled to have cscope on 10/13/20 and will need to hold Plavix for 5 days prior to his procedure. Is this Ok? Please advise.

## 2020-10-05 ENCOUNTER — PATIENT MESSAGE (OUTPATIENT)
Dept: ADMINISTRATIVE | Facility: HOSPITAL | Age: 60
End: 2020-10-05

## 2020-10-05 NOTE — TELEPHONE ENCOUNTER
Charly Travis MD  You 4 days ago     It is ok to continue the plavix for the colonoscopy but he needs to understand that if anything is found that is significant or large, we will have to bring him back to remove it during another procedure when off plavix.    Message text        You routed conversation to Charly Travis MD 4 days ago       AMENA Heard S Staff 4 days ago     Breann, please consult with Dr. SHUKLA about this and notify pt with further instructions regarding Plavix prior to scope. Thanks.    Message text        MD Radha Roper, SHERIDAN; Martine Hawthorne LPN 5 days ago     Thank you for the message.  This patient has coronary artery disease and is allergic to aspirin.  It has been several years since his last cardiac catheterization which was in 2017.  If the colonoscopy can be safely performed on Plavix it will be preferable.  He can stop Plavix if absolutely necessary for 5 days. However during this time it is important that the patient understands he is at increased risk for cardiovascular events.    Message text

## 2020-10-09 ENCOUNTER — TELEPHONE (OUTPATIENT)
Dept: GASTROENTEROLOGY | Facility: CLINIC | Age: 60
End: 2020-10-09

## 2020-10-09 NOTE — OR NURSING
Pt called and notified today that I secure chatted Dr Travis regarding message that he was remaining on Plavix for his colonoscopy.  Pt had colonoscopy with large polyps in 2016 and needs repeat colonoscopy omayra for Tuesday 10/13/2020.  Dr Travis wants the patient aware that she will not be able to remove large polyps on Plavix.  Pt had stated his cardiologist did not want him to stop the Plavix because of a blockage but he could not take it the day of the procedure.   Pt reports will call me back after speaks to cardiologist

## 2020-10-09 NOTE — TELEPHONE ENCOUNTER
----- Message from Princess SOULEYMANE Diaz sent at 10/9/2020  1:35 PM CDT -----  Contact: pt  Type: Needs Medical Advice  Who Called: pt  Best Call Back Number: [phone] (home)     Additional Information: requesting a call in regards to pt is needing to know if he doesn't have pt Covid tomorrow due to be closed and is schedule for Sunday can he still have his procedure.

## 2020-10-11 ENCOUNTER — LAB VISIT (OUTPATIENT)
Dept: FAMILY MEDICINE | Facility: CLINIC | Age: 60
End: 2020-10-11
Payer: MEDICAID

## 2020-10-11 DIAGNOSIS — Z01.818 PREOP EXAMINATION: ICD-10-CM

## 2020-10-11 PROCEDURE — U0003 INFECTIOUS AGENT DETECTION BY NUCLEIC ACID (DNA OR RNA); SEVERE ACUTE RESPIRATORY SYNDROME CORONAVIRUS 2 (SARS-COV-2) (CORONAVIRUS DISEASE [COVID-19]), AMPLIFIED PROBE TECHNIQUE, MAKING USE OF HIGH THROUGHPUT TECHNOLOGIES AS DESCRIBED BY CMS-2020-01-R: HCPCS

## 2020-10-12 LAB — SARS-COV-2 RNA RESP QL NAA+PROBE: NOT DETECTED

## 2020-10-13 ENCOUNTER — ANESTHESIA EVENT (OUTPATIENT)
Dept: ENDOSCOPY | Facility: HOSPITAL | Age: 60
End: 2020-10-13
Payer: MEDICAID

## 2020-10-13 ENCOUNTER — ANESTHESIA (OUTPATIENT)
Dept: ENDOSCOPY | Facility: HOSPITAL | Age: 60
End: 2020-10-13
Payer: MEDICAID

## 2020-10-13 ENCOUNTER — HOSPITAL ENCOUNTER (OUTPATIENT)
Facility: HOSPITAL | Age: 60
Discharge: HOME | End: 2020-10-13
Attending: INTERNAL MEDICINE | Admitting: INTERNAL MEDICINE
Payer: MEDICAID

## 2020-10-13 DIAGNOSIS — K51.011 ULCERATIVE PANCOLITIS WITH RECTAL BLEEDING: Primary | ICD-10-CM

## 2020-10-13 DIAGNOSIS — Z12.11 COLON CANCER SCREENING: ICD-10-CM

## 2020-10-13 PROCEDURE — 37000009 HC ANESTHESIA EA ADD 15 MINS: Performed by: INTERNAL MEDICINE

## 2020-10-13 PROCEDURE — 88305 TISSUE EXAM BY PATHOLOGIST: CPT | Mod: 26,,, | Performed by: PATHOLOGY

## 2020-10-13 PROCEDURE — D9220A PRA ANESTHESIA: Mod: CRNA,,, | Performed by: NURSE ANESTHETIST, CERTIFIED REGISTERED

## 2020-10-13 PROCEDURE — 45380 COLONOSCOPY AND BIOPSY: CPT | Performed by: INTERNAL MEDICINE

## 2020-10-13 PROCEDURE — 00813 ANES UPR LWR GI NDSC PX: CPT | Performed by: INTERNAL MEDICINE

## 2020-10-13 PROCEDURE — 25000003 PHARM REV CODE 250: Performed by: INTERNAL MEDICINE

## 2020-10-13 PROCEDURE — 45380 COLONOSCOPY AND BIOPSY: CPT | Mod: 59,,, | Performed by: INTERNAL MEDICINE

## 2020-10-13 PROCEDURE — 45385 COLONOSCOPY W/LESION REMOVAL: CPT | Performed by: INTERNAL MEDICINE

## 2020-10-13 PROCEDURE — D9220A PRA ANESTHESIA: ICD-10-PCS | Mod: CRNA,,, | Performed by: NURSE ANESTHETIST, CERTIFIED REGISTERED

## 2020-10-13 PROCEDURE — 45385 PR COLONOSCOPY,REMV LESN,SNARE: ICD-10-PCS | Mod: ,,, | Performed by: INTERNAL MEDICINE

## 2020-10-13 PROCEDURE — 45385 COLONOSCOPY W/LESION REMOVAL: CPT | Mod: ,,, | Performed by: INTERNAL MEDICINE

## 2020-10-13 PROCEDURE — D9220A PRA ANESTHESIA: ICD-10-PCS | Mod: ANES,,, | Performed by: ANESTHESIOLOGY

## 2020-10-13 PROCEDURE — 45380 PR COLONOSCOPY,BIOPSY: ICD-10-PCS | Mod: 59,,, | Performed by: INTERNAL MEDICINE

## 2020-10-13 PROCEDURE — 27201012 HC FORCEPS, HOT/COLD, DISP: Performed by: INTERNAL MEDICINE

## 2020-10-13 PROCEDURE — 27201089 HC SNARE, DISP (ANY): Performed by: INTERNAL MEDICINE

## 2020-10-13 PROCEDURE — D9220A PRA ANESTHESIA: Mod: ANES,,, | Performed by: ANESTHESIOLOGY

## 2020-10-13 PROCEDURE — 37000008 HC ANESTHESIA 1ST 15 MINUTES: Performed by: INTERNAL MEDICINE

## 2020-10-13 PROCEDURE — 25000003 PHARM REV CODE 250: Performed by: NURSE ANESTHETIST, CERTIFIED REGISTERED

## 2020-10-13 PROCEDURE — 88305 TISSUE EXAM BY PATHOLOGIST: ICD-10-PCS | Mod: 26,,, | Performed by: PATHOLOGY

## 2020-10-13 PROCEDURE — 63600175 PHARM REV CODE 636 W HCPCS: Performed by: NURSE ANESTHETIST, CERTIFIED REGISTERED

## 2020-10-13 PROCEDURE — 88305 TISSUE EXAM BY PATHOLOGIST: CPT | Mod: 59 | Performed by: PATHOLOGY

## 2020-10-13 RX ORDER — MESALAMINE 1.2 G/1
4.8 TABLET, DELAYED RELEASE ORAL
Qty: 120 TABLET | Refills: 11 | Status: SHIPPED | OUTPATIENT
Start: 2020-10-13 | End: 2022-01-26

## 2020-10-13 RX ORDER — SODIUM CHLORIDE 9 MG/ML
INJECTION, SOLUTION INTRAVENOUS CONTINUOUS
Status: DISCONTINUED | OUTPATIENT
Start: 2020-10-13 | End: 2020-10-13 | Stop reason: HOSPADM

## 2020-10-13 RX ORDER — MESALAMINE 4 G/60ML
4 SUSPENSION RECTAL NIGHTLY
Qty: 1800 ML | Refills: 11 | Status: SHIPPED | OUTPATIENT
Start: 2020-10-13 | End: 2021-10-13

## 2020-10-13 RX ORDER — LIDOCAINE HCL/PF 100 MG/5ML
SYRINGE (ML) INTRAVENOUS
Status: DISCONTINUED | OUTPATIENT
Start: 2020-10-13 | End: 2020-10-13

## 2020-10-13 RX ORDER — ASPIRIN 81 MG/1
81 TABLET ORAL DAILY
Status: ON HOLD | COMMUNITY
End: 2021-05-03 | Stop reason: CLARIF

## 2020-10-13 RX ORDER — PROPOFOL 10 MG/ML
VIAL (ML) INTRAVENOUS
Status: DISCONTINUED | OUTPATIENT
Start: 2020-10-13 | End: 2020-10-13

## 2020-10-13 RX ADMIN — PROPOFOL 150 MG: 10 INJECTION, EMULSION INTRAVENOUS at 09:10

## 2020-10-13 RX ADMIN — PROPOFOL 25 MG: 10 INJECTION, EMULSION INTRAVENOUS at 10:10

## 2020-10-13 RX ADMIN — PROPOFOL 50 MG: 10 INJECTION, EMULSION INTRAVENOUS at 09:10

## 2020-10-13 RX ADMIN — PROPOFOL 50 MG: 10 INJECTION, EMULSION INTRAVENOUS at 10:10

## 2020-10-13 RX ADMIN — LIDOCAINE HYDROCHLORIDE 100 MG: 20 INJECTION INTRAVENOUS at 09:10

## 2020-10-13 RX ADMIN — SODIUM CHLORIDE: 0.9 INJECTION, SOLUTION INTRAVENOUS at 09:10

## 2020-10-13 NOTE — [employer] PATIENT INSTRUCTIONS
Discharge Summary/Instructions after an Endoscopic Procedure  Patient Name: Michoacano Lind  Patient MRN: [id number]  Patient YOB: 1960 Tuesday, October 13, 2020  Charly Travis MD  RESTRICTIONS:  During your procedure today, you received medications for sedation.  These   medications may affect your judgment, balance and coordination.  Therefore,   for 24 hours, you have the following restrictions:   - DO NOT drive a car, operate machinery, make legal/financial decisions,   sign important papers or drink alcohol.    ACTIVITY:  Today: no heavy lifting, straining or running due to procedural   sedation/anesthesia.  The following day: return to full activity including work.  DIET:  Eat and drink normally unless instructed otherwise.     TREATMENT FOR COMMON SIDE EFFECTS:  - Mild abdominal pain, nausea, belching, bloating or excessive gas:  rest,   eat lightly and use a heating pad.  - Sore Throat: treat with throat lozenges and/or gargle with warm salt   water.  - Because air was used during the procedure, expelling large amounts of air   from your rectum or belching is normal.  - If a bowel prep was taken, you may not have a bowel movement for 1-3 days.    This is normal.  SYMPTOMS TO WATCH FOR AND REPORT TO YOUR PHYSICIAN:  1. Abdominal pain or bloating, other than gas cramps.  2. Chest pain.  3. Back pain.  4. Signs of infection such as: chills or fever occurring within 24 hours   after the procedure.  5. Rectal bleeding, which would show as bright red, maroon, or black stools.   (A tablespoon of blood from the rectum is not serious, especially if   hemorrhoids are present.)  6. Vomiting.  7. Weakness or dizziness.  GO DIRECTLY TO THE NEAREST EMERGENCY ROOM IF YOU HAVE ANY OF THE FOLLOWING:      Difficulty breathing              Chills and/or fever over 101 F   Persistent vomiting and/or vomiting blood   Severe abdominal pain   Severe chest pain   Black, tarry stools   Bleeding- more than one  tablespoon   Any other symptom or condition that you feel may need urgent attention  Your doctor recommends these additional instructions:  If any biopsies were taken, your doctors clinic will contact you in 1 to 2   weeks with any results.  - Await pathology results.   - Repeat colonoscopy in 2 years for surveillance.   - Discharge patient to home.   - Advance diet as tolerated.   - Continue present medications.   - Follow up in clinic to discuss findings and management  - Will need to start oral and rectal mesalamine for treatment which can be   discussed in clinic  - Written discharge instructions were provided to the patient.  For questions, problems or results please call your physician - Charly Travis MD at Work:  [phone].  [facility], EMERGENCY ROOM PHONE NUMBER: [phone]  IF A COMPLICATION OR EMERGENCY SITUATION ARISES AND YOU ARE UNABLE TO REACH   YOUR PHYSICIAN - GO DIRECTLY TO THE EMERGENCY ROOM.  Charly Travis MD  10/13/2020 10:32:18 AM  This report has been verified and signed electronically.  [employer]

## 2020-10-13 NOTE — TRANSFER OF CARE
"Anesthesia Transfer of Care Note    Patient: Michoacano Lind    Procedure(s) Performed: Procedure(s) (LRB):  COLONOSCOPY (N/A)    Patient location: PACU    Anesthesia Type: general    Transport from OR: Transported from OR on room air with adequate spontaneous ventilation    Post pain: adequate analgesia    Post assessment: no apparent anesthetic complications and tolerated procedure well    Post vital signs: stable    Level of consciousness: awake    Nausea/Vomiting: no nausea/vomiting    Complications: none    Transfer of care protocol was followed      Last vitals:   Visit Vitals  BP (!) 88/54   Pulse 73   Temp 36.7 °C (98.1 °F)   Resp 16   Ht 5' 6" (1.676 m)   Wt 71.7 kg (158 lb)   SpO2 99%   BMI 25.50 kg/m²     "

## 2020-10-13 NOTE — ANESTHESIA PREPROCEDURE EVALUATION
10/13/2020  Michoacano Lind is a 60 y.o., male.    Anesthesia Evaluation    I have reviewed the Patient Summary Reports.    I have reviewed the Nursing Notes. I have reviewed the NPO Status.   I have reviewed the Medications.     Review of Systems  Anesthesia Hx:  No problems with previous Anesthesia    Social:  Former Smoker    Hematology/Oncology:  Hematology Normal   Oncology Normal     EENT/Dental:EENT/Dental Normal   Cardiovascular:   Hypertension CAD   hyperlipidemia    Pulmonary:  Pulmonary Normal    Hepatic/GI:   Bowel Prep. PUD,    Musculoskeletal:  Musculoskeletal Normal    Neurological:  Neurology Normal    Endocrine:   Diabetes        Physical Exam  General:  Well nourished    Airway/Jaw/Neck:  Airway Findings: Mouth Opening: Normal Tongue: Normal  General Airway Assessment: Adult  Mallampati: III  Improves to II with phonation.  TM Distance: Normal, at least 6 cm        Eyes/Ears/Nose:  EYES/EARS/NOSE FINDINGS: Normal   Dental:  DENTAL FINDINGS: Normal   Chest/Lungs:  Chest/Lungs Findings: Clear to auscultation, Normal Respiratory Rate     Heart/Vascular:  Heart Findings: Rate: Normal  Rhythm: Regular Rhythm        Mental Status:  Mental Status Findings:  Cooperative, Alert and Oriented         Anesthesia Plan  Type of Anesthesia, risks & benefits discussed:  Anesthesia Type:  general  Patient's Preference:   Intra-op Monitoring Plan: standard ASA monitors  Intra-op Monitoring Plan Comments:   Post Op Pain Control Plan:   Post Op Pain Control Plan Comments:   Induction:   IV  Beta Blocker:  Patient is on a Beta-Blocker and has received one dose within the past 24 hours (No further documentation required).       Informed Consent: Patient understands risks and agrees with Anesthesia plan.  Questions answered. Anesthesia consent signed with patient.  ASA Score: 3     Day of Surgery Review of  History & Physical: I have interviewed and examined the patient. I have reviewed the patient's H&P dated:    H&P update referred to the provider.         Ready For Surgery From Anesthesia Perspective.

## 2020-10-13 NOTE — ANESTHESIA POSTPROCEDURE EVALUATION
Anesthesia Post Evaluation    Patient: Michoacano Lind    Procedure(s) Performed: Procedure(s) (LRB):  COLONOSCOPY (N/A)    Final Anesthesia Type: general    Patient location during evaluation: PACU  Patient participation: Yes- Able to Participate  Level of consciousness: awake and alert  Post-procedure vital signs: reviewed and stable  Pain management: adequate  Airway patency: patent    PONV status at discharge: No PONV  Anesthetic complications: no      Cardiovascular status: hemodynamically stable  Respiratory status: unassisted and room air  Hydration status: euvolemic  Follow-up not needed.          Vitals Value Taken Time   /69 10/13/20 1106   Temp 36.4 °C (97.6 °F) 10/13/20 1032   Pulse 57 10/13/20 1106   Resp 14 10/13/20 1106   SpO2 97 % 10/13/20 1106         Event Time   Out of Recovery 11:20:10         Pain/Missy Score: Missy Score: 10 (10/13/2020 10:52 AM)

## 2020-10-13 NOTE — PLAN OF CARE
Patient arrived via stretcher asleep, unarousable. Placed in slight trendelenburg and fluids opened up. B/P continues to increase and patient is arousable at this time. No distress noted, denies pain. Patient to discharge home when criteria is met.

## 2020-10-13 NOTE — H&P
History & Physical - Short Stay  Gastroenterology    SUBJECTIVE:     Procedure: Colonoscopy    Chief Complaint/Indication for Procedure: Screening    PTA Medications   Medication Sig    amLODIPine (NORVASC) 5 MG tablet Take 1 tablet (5 mg total) by mouth once daily.    aspirin (ECOTRIN) 81 MG EC tablet Take 81 mg by mouth once daily.    empagliflozin (JARDIANCE) 25 mg Tab Take 25 mg by mouth once daily.    glipiZIDE (GLUCOTROL) 10 MG tablet Take 1 tablet (10 mg total) by mouth 2 (two) times daily before meals.    losartan (COZAAR) 100 MG tablet Take 1 tablet (100 mg total) by mouth once daily.    metFORMIN (GLUCOPHAGE-XR) 500 MG XR 24hr tablet TAKE 2 TABLETS BY MOUTH TWICE DAILY WITH  MEALS    metoprolol succinate (TOPROL-XL) 50 MG 24 hr tablet TAKE 1 TABLET BY MOUTH ONCE DAILY    rosuvastatin (CRESTOR) 40 MG Tab Take 1 tablet (40 mg total) by mouth every evening.    blood-glucose meter Misc Test qd    clopidogrel (PLAVIX) 75 mg tablet Take 1 tablet (75 mg total) by mouth once daily.    nitroGLYCERIN (NITROSTAT) 0.4 MG SL tablet Place 1 tablet (0.4 mg total) under the tongue every 5 (five) minutes as needed for Chest pain.    pantoprazole (PROTONIX) 40 MG tablet TAKE ONE TABLET BY MOUTH ONCE DAILY    TRUE METRIX GLUCOSE TEST STRIP Strp USE TO TEST ONCE DAILY     Review of patient's allergies indicates:   Allergen Reactions    Aspirin     Lisinopril Other (See Comments)     Cough 2/2 aceI    Aspirin      Other^bloody diarrhea    Bydureon [exenatide microspheres]      Rash, loss of appetite.         Past Medical History:   Diagnosis Date    Coronary artery disease involving native coronary artery of native heart without angina pectoris 7/29/2017    Diabetes mellitus, type II     HTN (hypertension)     Mixed hyperlipidemia 10/24/2019    Ulcerative colitis      Past Surgical History:   Procedure Laterality Date    CATARACT EXTRACTION Bilateral 2013    COLONOSCOPY N/A 1/22/2016    Procedure:  COLONOSCOPY;  Surgeon: Aristeo Lynn Jr., MD;  [facility];  Service: Endoscopy;  Laterality: N/A;     Family History   Problem Relation Age of Onset    Coronary artery disease Mother 75    Coronary artery disease Father 55    Coronary artery disease Brother 59    Diabetes Brother     Prostate cancer Neg Hx     Colon cancer Neg Hx      Social History     Tobacco Use    Smoking status: Former Smoker     Quit date: 3/16/2008     Years since quittin.5    Smokeless tobacco: Former User   Substance Use Topics    Alcohol use: Not Currently     Alcohol/week: 0.0 standard drinks     Frequency: Monthly or less     Drinks per session: 1 or 2     Binge frequency: Never     Comment: occasionally    Drug use: No     OBJECTIVE:     Vital Signs (Most Recent)  Temp: 98.2 °F (36.8 °C) (10/13/20 0918)  Pulse: 66 (10/13/20 0918)  Resp: 16 (10/13/20 0918)  BP: 137/76 (10/13/20 0918)  SpO2: 100 % (10/13/20 0918)    Physical Exam:                                                       GENERAL:  Comfortable, in no acute distress.                   HEENT EXAM:  Nonicteric.  No adenopathy.  Oropharynx is clear.               NECK:  Supple.                                                               LUNGS:  Clear.                                                               CARDIAC:  Regular rate and rhythm.  S1, S2.  No murmur.                      ABDOMEN:  Soft, positive bowel sounds, nontender.  No hepatosplenomegaly or masses.  No rebound or guarding.                                             EXTREMITIES:  No edema.     MENTAL STATUS:  Normal, alert and oriented.    ASSESSMENT/PLAN:     Assessment: Screening    Plan: Colonoscopy    Anesthesia Plan: General    ASA Grade: ASA 3 - Patient with moderate systemic disease with functional limitations    MALLAMPATI SCORE:  II (hard and soft palate, upper portion of tonsils anduvula visible)

## 2020-10-13 NOTE — DISCHARGE INSTRUCTIONS
Ulcerative Colitis  You have been diagnosed with ulcerative colitis. Ulcerative colitis is a chronic condition that causes inflammation and ulcers in the rectum and colon. It is a form of inflammatory bowel disease (IBD). The disease is usually diagnosed by a special procedure called a colonoscopy. The symptoms usually develop over time. There is no medicine that can cure ulcerative colitis. The goal of treatment is to reduce the symptoms, and cause a remission.  Symptoms of ulcerative colitis include:  · Abdominal cramps and pain  · Diarrhea, usually bloody  · Rectal bleeding  · Rectal pain  · Fever  · Decreased appetite and weight loss  · Low energy  · Inflammation outside of the colon can occur and can cause pain or swelling in places like the eyes, skin, and joints  Home care  No one knows what exactly causes IBD. The goal is to control and relieve the symptoms, and prevent complications, so you can lead a full and active life. No medicine can cure the disease, but in some cases, surgery to remove the whole colon can be curative. However, surgery causes other side effects and so medicines are often preferred. Discuss your options with your healthcare provider.  Diet  Your diet did not cause your condition, but it can affect it. Unfortunately, no one diet that works for everyone, so you have to experiment. Below are some recommendations, but what works for you may be different. Keep a food log to figure out what you are sensitive to.  · Eat more slowly. Eat smaller amounts at a time, but more often. Remember, you can always eat more, but can't eat less once you've eaten too much.  · High-fiber foods are complicated. While they may help constipation, they can make bloating, cramping, gas, and diarrhea worse.  · Eat less sugar.  · Try avoiding dairy products if you feel you are sensitive to lactose.  · Try cutting out foods that are high in fat and fatty meats.  · You can control bloating and passing excess gas.  "Be careful with "gassy" vegetables and fruits like beans, cabbage, broccoli, and cauliflower.  · Be careful of carbonated beverages and fruit juices. They can make bloating and diarrhea worse.  · Caffeine, alcohol, and stimulants may make symptoms worse.  Lifestyle  Although stress doesn't cause IBD, it is a factor in flare-ups, and how you feel and react to your condition.  · Look for things that seem to make your symptoms worse, such as stress and emotions.  · Counseling can help you deal with stress. So can self-help measure like exercise, yoga, and meditation.  · Depression can be a part of this illness and antidepressant medicine may be prescribed. This may actually help with diarrhea, constipation, and cramping, as well as symptoms of depression.  · Smoking can make symptoms worse.  · Lack of sleep can make symptoms seem worse.  · Alcohol use can make symptoms worse.  Medicines  Your healthcare provider may prescribe medicines. Take them as directed. In most situations, lifelong medicine is necessary. For acute flares, additional prescription medicines can be prescribed. Call your provider if you need these.  · Ask your healthcare provider before taking any medicines for diarrhea.  · Avoid anti-inflammatory medicines like ibuprofen or naproxen.  · Consider nutritional supplements. This is especially true if the diarrhea is prolonged, or you aren't eating or are losing weight.  Follow-up care  Follow up with your healthcare provider, or as advised. Tell your provider if you lose more than 5 pounds over 3 to 6 months, and you aren't trying to lose weight.  If a stool sample was taken, or cultures were done, you will be told if they are positive, or if your treatment needs to be changed. You can call as directed for results.  If X-rays were done, a radiologist will look at them. You will be told if you need a change in treatment  It is very important to tell your doctor if you intend to get pregnant, or find out " you are pregnant. You will need to discuss your disease, medicines, and plan as early as possible and preferably before you conceive.  Call 911  Call 911 if any of these occur:  · Trouble breathing  · Confusion  · Very drowsy or trouble awakening  · Fainting or loss of consciousness  · Rapid heart rate  · Chest pain  When to seek medical advice  Call your healthcare provider right away if any of these occur:  · Bleeding from your rectum  · Frequent diarrhea or abdominal pain that's not controlled by your medicine  · Bloody diarrhea  · Fever of 100.4ºF (38ºC) or higher, or as directed by your health care provider  · Persistent nausea or repeated vomiting   Date Last Reviewed: 12/30/2015  © 4545-2284 [employer]. [address] [place] [address], [place] [address]. All rights reserved. This information is not intended as a substitute for professional medical care. Always follow your healthcare professional's instructions.        Understanding Colon and Rectal Polyps    The colon (also called the large intestine) is a muscular tube that forms the last part of the digestive tract. It absorbs water and stores food waste. The colon is about 4 to 6 feet long. The rectum is the last 6 inches of the colon. The colon and rectum have a smooth lining composed of millions of cells. Changes in these cells can lead to growths in the colon that can become cancerous and should be removed. Multiple tests are available to screen for colon cancer, but the colonoscopy is the most recommended test. During colonoscopy, these polyps can be removed. How often you need this test depends on many things including your condition, your family history, symptoms, and what the findings were at the previous colonoscopy.   When the colon lining changes  Changes that happen in the cells that line the colon or rectum can lead to growths called polyps. Over a period of years, polyps can turn cancerous. Removing polyps early may prevent cancer from  ever forming.  Polyps  Polyps are fleshy clumps of tissue that form on the lining of the colon or rectum. Small polyps are usually benign (not cancerous). However, over time, cells in a polyp can change and become cancerous. Certain types of polyps known as adenomatous polyps are premalignant. The risk for invasive cancer increases with the size of the polyp and certain cell and gene features. This means that they can become cancerous if they're not removed. Hyperplastic polyps are benign. They can grow quite large and not turn cancerous.   Cancer  Almost all colorectal cancers start when polyp cells begin growing abnormally. As a cancerous tumor grows, it may involve more and more of the colon or rectum. In time, cancer can also grow beyond the colon or rectum and spread to nearby organs or to glands called lymph nodes. The cells can also travel to other parts of the body. This is known as metastasis. The earlier a cancerous tumor is removed, the better the chance of preventing its spread.    Date Last Reviewed: 8/1/2016  © 1640-2577 [employer]. [address] [place] [address]. All rights reserved. This information is not intended as a substitute for professional medical care. Always follow your healthcare professional's instructions.        Colonoscopy     A camera attached to a flexible tube with a viewing lens is used to take video pictures.     Colonoscopy is a test to view the inside of your lower digestive tract (colon and rectum). Sometimes it can show the last part of the small intestine (ileum). During the test, small pieces of tissue may be removed for testing. This is called a biopsy. Small growths, such as polyps, may also be removed.   Why is colonoscopy done?  The test is done to help look for colon cancer. And it can help find the source of abdominal pain, bleeding, and changes in bowel habits. It may be needed once a year, depending on factors such as your:  · Age  · Health  history  · Family health history  · Symptoms  · Results from any prior colonoscopy  Risks and possible complications  These include:  · Bleeding               · A puncture or tear in the colon   · Risks of anesthesia  · A cancer lesion not being seen  Getting ready   To prepare for the test:  · Talk with your healthcare provider about the risks of the test (see below). Also ask your healthcare provider about alternatives to the test.  · Tell your healthcare provider about any medicines you take. Also tell him or her about any health conditions you may have.  · Make sure your rectum and colon are empty for the test. Follow the diet and bowel prep instructions exactly. If you dont, the test may need to be rescheduled.  · Plan for a friend or family member to drive you home after the test.     Colonoscopy provides an inside view of the entire colon.     You may discuss the results with your doctor right away or at a future visit.  During the test   The test is usually done in the hospital on an outpatient basis. This means you go home the same day. The procedure takes about 30 minutes. During that time:  · You are given relaxing (sedating) medicine through an IV line. You may be drowsy, or fully asleep.  · The healthcare provider will first give you a physical exam to check for anal and rectal problems.  · Then the anus is lubricated and the scope inserted.  · If you are awake, you may have a feeling similar to needing to have a bowel movement. You may also feel pressure as air is pumped into the colon. Its OK to pass gas during the procedure.  · Biopsy, polyp removal, or other treatments may be done during the test.  After the test   You may have gas right after the test. It can help to try to pass it to help prevent later bloating. Your healthcare provider may discuss the results with you right away. Or you may need to schedule a follow-up visit to talk about the results. After the test, you can go back to your  normal eating and other activities. You may be tired from the sedation and need to rest for a few hours.  Date Last Reviewed: 11/1/2016 © 2000-2017 [employer] [facility]. [address] [place] [address], [place] [address]. All rights reserved. This information is not intended as a substitute for professional medical care. Always follow your healthcare professional's instructions.        Discharge Instructions: After Your Surgery  Youve just had surgery. During surgery, you were given medicine called anesthesia to keep you relaxed and free of pain. After surgery, you may have some pain or nausea. This is common. Here are some tips for feeling better and getting well after surgery.     Stay on schedule with your medicine.   Going home  Your healthcare provider will show you how to take care of yourself when you go home. He or she will also answer your questions. Have an adult family member or friend drive you home. For the first 24 hours after your surgery:  · Do not drive or use heavy equipment.  · Do not make important decisions or sign legal papers.  · Do not drink alcohol.  · Have someone stay with you, if needed. He or she can watch for problems and help keep you safe.  Be sure to go to all follow-up visits with your healthcare provider. And rest after your surgery for as long as your healthcare provider tells you to.  Coping with pain  If you have pain after surgery, pain medicine will help you feel better. Take it as told, before pain becomes severe. Also, ask your healthcare provider or pharmacist about other ways to control pain. This might be with heat, ice, or relaxation. And follow any other instructions your surgeon or nurse gives you.  Tips for taking pain medicine  To get the best relief possible, remember these points:  · Pain medicines can upset your stomach. Taking them with a little food may help.  · Most pain relievers taken by mouth need at least 20 to 30 minutes to start to work.  · Taking medicine  on a schedule can help you remember to take it. Try to time your medicine so that you can take it before starting an activity. This might be before you get dressed, go for a walk, or sit down for dinner.  · Constipation is a common side effect of pain medicines. Call your healthcare provider before taking any medicines such as laxatives or stool softeners to help ease constipation. Also ask if you should skip any foods. Drinking lots of fluids and eating foods such as fruits and vegetables that are high in fiber can also help. Remember, do not take laxatives unless your surgeon has prescribed them.  · Drinking alcohol and taking pain medicine can cause dizziness and slow your breathing. It can even be deadly. Do not drink alcohol while taking pain medicine.  · Pain medicine can make you react more slowly to things. Do not drive or run machinery while taking pain medicine.  Your healthcare provider may tell you to take acetaminophen to help ease your pain. Ask him or her how much you are supposed to take each day. Acetaminophen or other pain relievers may interact with your prescription medicines or other over-the-counter (OTC) medicines. Some prescription medicines have acetaminophen and other ingredients. Using both prescription and OTC acetaminophen for pain can cause you to overdose. Read the labels on your OTC medicines with care. This will help you to clearly know the list of ingredients, how much to take, and any warnings. It may also help you not take too much acetaminophen. If you have questions or do not understand the information, ask your pharmacist or healthcare provider to explain it to you before you take the OTC medicine.  Managing nausea  Some people have an upset stomach after surgery. This is often because of anesthesia, pain, or pain medicine, or the stress of surgery. These tips will help you handle nausea and eat healthy foods as you get better. If you were on a special food plan before surgery,  ask your healthcare provider if you should follow it while you get better. These tips may help:  · Do not push yourself to eat. Your body will tell you when to eat and how much.  · Start off with clear liquids and soup. They are easier to digest.  · Next try semi-solid foods, such as mashed potatoes, applesauce, and gelatin, as you feel ready.  · Slowly move to solid foods. Dont eat fatty, rich, or spicy foods at first.  · Do not force yourself to have 3 large meals a day. Instead eat smaller amounts more often.  · Take pain medicines with a small amount of solid food, such as crackers or toast, to avoid nausea.     Call your surgeon if  · You still have pain an hour after taking medicine. The medicine may not be strong enough.  · You feel too sleepy, dizzy, or groggy. The medicine may be too strong.  · You have side effects like nausea, vomiting, or skin changes, such as rash, itching, or hives.       If you have obstructive sleep apnea  You were given anesthesia medicine during surgery to keep you comfortable and free of pain. After surgery, you may have more apnea spells because of this medicine and other medicines you were given. The spells may last longer than usual.   At home:  · Keep using the continuous positive airway pressure (CPAP) device when you sleep. Unless your healthcare provider tells you not to, use it when you sleep, day or night. CPAP is a common device used to treat obstructive sleep apnea.  · Talk with your provider before taking any pain medicine, muscle relaxants, or sedatives. Your provider will tell you about the possible dangers of taking these medicines.  Date Last Reviewed: 12/1/2016  © 5667-8578 [employer]. [address] [place] [address], [place] [address]. All rights reserved. This information is not intended as a substitute for professional medical care. Always follow your healthcare professional's instructions.

## 2020-10-14 VITALS
DIASTOLIC BLOOD PRESSURE: 69 MMHG | TEMPERATURE: 98 F | WEIGHT: 158 LBS | RESPIRATION RATE: 14 BRPM | HEIGHT: 66 IN | BODY MASS INDEX: 25.39 KG/M2 | OXYGEN SATURATION: 97 % | SYSTOLIC BLOOD PRESSURE: 126 MMHG | HEART RATE: 57 BPM

## 2020-10-16 LAB
COMMENT: NORMAL
FINAL PATHOLOGIC DIAGNOSIS: NORMAL
GROSS: NORMAL
Lab: NORMAL

## 2020-10-19 ENCOUNTER — PATIENT MESSAGE (OUTPATIENT)
Dept: GASTROENTEROLOGY | Facility: CLINIC | Age: 60
End: 2020-10-19

## 2020-10-20 RX ORDER — GLIPIZIDE 10 MG/1
10 TABLET ORAL
Qty: 180 TABLET | Refills: 3 | Status: SHIPPED | OUTPATIENT
Start: 2020-10-20 | End: 2021-11-02

## 2020-10-20 RX ORDER — METFORMIN HYDROCHLORIDE 500 MG/1
1000 TABLET, EXTENDED RELEASE ORAL 2 TIMES DAILY WITH MEALS
Qty: 180 TABLET | Refills: 3 | Status: SHIPPED | OUTPATIENT
Start: 2020-10-20 | End: 2021-11-02

## 2020-10-20 RX ORDER — METOPROLOL SUCCINATE 50 MG/1
50 TABLET, EXTENDED RELEASE ORAL DAILY
Qty: 90 TABLET | Refills: 3 | Status: SHIPPED | OUTPATIENT
Start: 2020-10-20 | End: 2021-11-02

## 2020-10-22 ENCOUNTER — PATIENT MESSAGE (OUTPATIENT)
Dept: GASTROENTEROLOGY | Facility: CLINIC | Age: 60
End: 2020-10-22

## 2020-10-23 ENCOUNTER — PATIENT MESSAGE (OUTPATIENT)
Dept: GASTROENTEROLOGY | Facility: CLINIC | Age: 60
End: 2020-10-23

## 2020-10-28 ENCOUNTER — PATIENT OUTREACH (OUTPATIENT)
Dept: ADMINISTRATIVE | Facility: OTHER | Age: 60
End: 2020-10-28

## 2020-10-28 NOTE — PROGRESS NOTES
Requested updates within Care Everywhere.  Patient's chart was reviewed for overdue ERNESTINE topics.  Media reviewed for outside eye exam.  Immunizations reconciled.

## 2020-10-30 ENCOUNTER — PATIENT OUTREACH (OUTPATIENT)
Dept: ADMINISTRATIVE | Facility: HOSPITAL | Age: 60
End: 2020-10-30

## 2020-11-05 ENCOUNTER — OFFICE VISIT (OUTPATIENT)
Dept: CARDIOLOGY | Facility: CLINIC | Age: 60
End: 2020-11-05
Payer: MEDICAID

## 2020-11-05 VITALS
OXYGEN SATURATION: 100 % | HEIGHT: 66 IN | WEIGHT: 158.5 LBS | DIASTOLIC BLOOD PRESSURE: 87 MMHG | HEART RATE: 79 BPM | SYSTOLIC BLOOD PRESSURE: 162 MMHG | BODY MASS INDEX: 25.47 KG/M2

## 2020-11-05 DIAGNOSIS — I10 ESSENTIAL HYPERTENSION: ICD-10-CM

## 2020-11-05 DIAGNOSIS — I20.89 ANGINA OF EFFORT: Primary | ICD-10-CM

## 2020-11-05 DIAGNOSIS — I25.119 CORONARY ARTERY DISEASE INVOLVING NATIVE CORONARY ARTERY OF NATIVE HEART WITH ANGINA PECTORIS: ICD-10-CM

## 2020-11-05 DIAGNOSIS — I25.5 CARDIOMYOPATHY, ISCHEMIC: ICD-10-CM

## 2020-11-05 DIAGNOSIS — E78.2 MIXED HYPERLIPIDEMIA: ICD-10-CM

## 2020-11-05 PROCEDURE — 99999 PR PBB SHADOW E&M-EST. PATIENT-LVL IV: ICD-10-PCS | Mod: PBBFAC,,, | Performed by: INTERNAL MEDICINE

## 2020-11-05 PROCEDURE — 99999 PR PBB SHADOW E&M-EST. PATIENT-LVL IV: CPT | Mod: PBBFAC,,, | Performed by: INTERNAL MEDICINE

## 2020-11-05 PROCEDURE — 99213 OFFICE O/P EST LOW 20 MIN: CPT | Mod: S$PBB,,, | Performed by: INTERNAL MEDICINE

## 2020-11-05 PROCEDURE — 99214 OFFICE O/P EST MOD 30 MIN: CPT | Mod: PBBFAC | Performed by: INTERNAL MEDICINE

## 2020-11-05 PROCEDURE — 99213 PR OFFICE/OUTPT VISIT, EST, LEVL III, 20-29 MIN: ICD-10-PCS | Mod: S$PBB,,, | Performed by: INTERNAL MEDICINE

## 2020-11-05 RX ORDER — SODIUM, POTASSIUM,MAG SULFATES 17.5-3.13G
SOLUTION, RECONSTITUTED, ORAL ORAL
COMMUNITY
Start: 2020-09-30 | End: 2022-06-20

## 2020-11-16 ENCOUNTER — OFFICE VISIT (OUTPATIENT)
Dept: GASTROENTEROLOGY | Facility: CLINIC | Age: 60
End: 2020-11-16
Payer: MEDICAID

## 2020-11-16 VITALS — BODY MASS INDEX: 25.44 KG/M2 | WEIGHT: 158.31 LBS | HEIGHT: 66 IN

## 2020-11-16 DIAGNOSIS — K63.9 DISEASE OF INTESTINE, UNSPECIFIED: ICD-10-CM

## 2020-11-16 DIAGNOSIS — K51.511 LEFT SIDED ULCERATIVE COLITIS WITH RECTAL BLEEDING: Primary | ICD-10-CM

## 2020-11-16 DIAGNOSIS — Z01.812 PRE-PROCEDURE LAB EXAM: ICD-10-CM

## 2020-11-16 PROCEDURE — 99214 OFFICE O/P EST MOD 30 MIN: CPT | Mod: S$PBB,,, | Performed by: INTERNAL MEDICINE

## 2020-11-16 PROCEDURE — 99214 PR OFFICE/OUTPT VISIT, EST, LEVL IV, 30-39 MIN: ICD-10-PCS | Mod: S$PBB,,, | Performed by: INTERNAL MEDICINE

## 2020-11-16 PROCEDURE — 99999 PR PBB SHADOW E&M-EST. PATIENT-LVL III: CPT | Mod: PBBFAC,,, | Performed by: INTERNAL MEDICINE

## 2020-11-16 PROCEDURE — 99999 PR PBB SHADOW E&M-EST. PATIENT-LVL III: ICD-10-PCS | Mod: PBBFAC,,, | Performed by: INTERNAL MEDICINE

## 2020-11-16 PROCEDURE — 99213 OFFICE O/P EST LOW 20 MIN: CPT | Mod: PBBFAC,PN | Performed by: INTERNAL MEDICINE

## 2020-11-16 NOTE — PROGRESS NOTES
Chief Complaint: Ulcerative colitis    HPI: 60 y.o. male h/o longstanding left sided chronic ulcerative colitis associated with intermittent diarrhea and rectal bleeding who presents for follow up. He recently had colonoscopy which showed Cadena 2 endoscopic inflammation in the sigmoid and rectum. Biopsies showed chronic mucosal injury with mild architectural distortion and paneth cell metaplasia. No active inflammation or granulomas or dysplasia were noted. Polyps removed were inflammatory polyps.     During Janelle he states his disease started with significant symptoms and then was diagnosed around 2007 by Dr. Brice. He does not remember much in regards to treatment. He has also see Dr. Coppola and Dr. Lynn in the past. From review of notes, the only medications he has been on are steroids, mesalamine. He has been off medications for several years. He is biologic naive. He denies extraintestinal manifestations. Denies fevers, chills. Over the years he has required brief periods of steroid use. Patient denies any steroid use recently. He reports issues with eye changes from steroids. He had significant skin changes and issues with poor glucose control.    Has around 2-3 formed bowel movements daily with minimal blood.     Medical records reviewed. Additional history supplemented by nursing.     IBD History:   IBD disease: Ulcerative colitis   Disease location: Left sided (sigmoid, rectum)  Disease behavior: Inflammatory  Current therapy: Mesalamine 4.8 g daily oral and rectal enemas nightly  Prior therapy: Prednisone, mesalamine  Surgeries: None  Complications: None  Extraintestinal manifestations: None    Review of Systems:   General ROS: negative for chills, fever, or weight loss  Ophthalmic ROS: negative for blurry vision, photophobia, or eye pain  ENT ROS: negative for oral ulcers, sore throat or rhinorrhea  Respiratory ROS: no cough, shortness of breath, or wheezing  Cardiovascular ROS: no chest pain,  "palpitations, or dyspnea on exertion  Gastrointestinal ROS: per HPI  Musculoskeletal ROS: no arthralgias, myalgias, joint swelling or erythema  Dermatological ROS: negative for pruritis, rash, ulcers, nodules    Past Medical History:  Ulcerative colitis    Physical Examination:  Ht 5' 6" (1.676 m)   Wt 71.8 kg (158 lb 4.6 oz)   BMI 25.55 kg/m²    General appearance: alert, cooperative, no distress  HENT: Normocephalic, atraumatic, neck symmetrical, no nasal discharge   Eyes: conjunctivae/corneas clear, PERRL, EOM's intact  Lungs: clear to auscultation bilaterally, no dullness to percussion bilaterally  Heart: regular rate and rhythm without rub; no displacement of the PMI   Abdomen: soft, non-tender; bowel sounds normoactive; no organomegaly  Extremities: extremities symmetric; no clubbing, cyanosis, or edema  Integument: Skin color, texture, turgor normal; no rashes; hair distrubution normal  Neurologic: Alert and oriented X 3, normal strength, normal coordination and gait  Psychiatric: no pressured speech; normal affect; no evidence of impaired cognition     Most recent Labs/Stool studies:  Lab Results   Component Value Date    WBC 7.17 11/17/2020    HGB 12.4 (L) 11/17/2020     Lab Results   Component Value Date    CREATININE 0.8 11/17/2020    CREATININE 0.8 11/17/2020     Lab Results   Component Value Date    ALT 18 11/17/2020    AST 13 11/17/2020    ALKPHOS 83 11/17/2020    BILITOT 0.6 11/17/2020     Lab Results   Component Value Date    ELWXCROD62 592 11/17/2020     No components found for: 25OHVITDTOT  Lab Results   Component Value Date    FERRITIN 14 (L) 11/17/2020     Lab Results   Component Value Date    CRP 2.2 11/17/2020     Lab Results   Component Value Date    TSH 1.930 11/04/2019     Most recent Imaging:  CTE:Findings of inflammatory bowel disease involving the rectum and distal most sigmoid colon    Most recent Endoscopy:   Colonoscopy:  The perianal and digital rectal examinations were normal.      "   The terminal ileum appeared normal.        The transverse colon and ascending colon appeared normal. Biopsies        were taken with a cold forceps for histology. Verification of        patient identification for the specimen was done. Estimated blood        loss was minimal.        A diffuse area of moderately altered vascular, erythematous,        granular, hemorrhagic and inflamed mucosa was found in the rectum,        in the sigmoid colon and in the descending colon. Biopsies were        taken with a cold forceps for histology. Verification of patient        identification for the specimen was done. Estimated blood loss was        minimal. (Jar 1:AC, Jar 2: TC, Jar 3: DC, Jar 4: SC, Jar 5:        Pseudopolyps, Jar 6: Rectum)   PATH:    1.  Colon, ascending (biopsy):   Colonic mucosa with no significant histopathologic changes   2.  Colon, transverse (biopsy):   Colonic mucosa with no significant histopathologic changes   3.  Colon, descending (biopsy):   Colonic mucosa with no significant histopathologic changes   4.  Colon, sigmoid (biopsy):   Changes suggestive of chronic mucosal injury   Including mild architectural distortion and Paneth cell metaplasia   No active inflammation   No granulomas, no dysplasia   5.  Rectal polyps (polypectomies):   Inflammatory polyp, no dysplasia   6.  Rectum (biopsy):   Chronic proctitis with mild activity   No granulomas, no dysplasia     Assessment and Plan: 60 y.o. male with    1. Left sided Ulcerative colitis  2. Currently on oral mesalamine 4.8 g daily and Rowasa enemas nightly  3. Multiple inflammatory polyps on recent colonoscopy  4. Health maintenance    Recent colonoscopy showed left sided inflammation consistent with Cadena 2 endoscopic disease activity with multiple inflammatory polyps that were removed. I have discussed medical management today with Mr. Lind in clinic.      He recently restarted oral mesalamine 4.8 g daily and Rowasa enemas nightly. He previously  was not consistent with the recommended medications. II have discussed the importance of compliance with medications. We will plan for a trial of 3 months of therapy with oral and rectal mesalamine. Then we will perform colonoscopy and if he is not in endoscopic remission he will likely need step up therapy for UC.     I will check labs including hepatitis serology and quantiferon gold. I will check vitamin levels as well. He should have a fecal calprotectin checked as a non-invasive marker of inflammation.       For health maintenance, I have listed the recommended vaccinations below.   ----------------------------------------------------------------------------------------------------------------------  Health Maintenance:   -Prevnar 13: Recommend   -Pneumovax 23: Received 12/2015  -Flu Shot: Recommend annually  -Shingrix: Recommend now  -Hepatitis A/Hepatitis B: Check antibody and if not immune will vaccinate  -Annual skin exam: Recommend   -Colon cancer screening: Risks factors: >1/3 of colon involved with colitis and >8-10 years of IBD duration, Distribution of colonic disease: Left sided  , Year of symptom onset: 2005 ; Colonoscopy:  every 1-2 years for surveillance once in endoscopic remission  -DEXA scan: Recommend  -Tobacco: Avoid  -Should avoid NSAIDs and narcotics, use only Tylenol for pain relief.     Follow up: RTC 3 months    Charly Travis MD  [facility]  [address]  [place] [address]  Office: [phone]  Fax: [phone]

## 2020-11-17 ENCOUNTER — LAB VISIT (OUTPATIENT)
Dept: LAB | Facility: HOSPITAL | Age: 60
End: 2020-11-17
Attending: INTERNAL MEDICINE
Payer: MEDICAID

## 2020-11-17 DIAGNOSIS — K51.511 LEFT SIDED ULCERATIVE COLITIS WITH RECTAL BLEEDING: ICD-10-CM

## 2020-11-17 LAB
25(OH)D3+25(OH)D2 SERPL-MCNC: 30 NG/ML (ref 30–96)
ALBUMIN SERPL BCP-MCNC: 4.2 G/DL (ref 3.5–5.2)
ALP SERPL-CCNC: 83 U/L (ref 55–135)
ALT SERPL W/O P-5'-P-CCNC: 18 U/L (ref 10–44)
ANION GAP SERPL CALC-SCNC: 11 MMOL/L (ref 8–16)
AST SERPL-CCNC: 13 U/L (ref 10–40)
BASOPHILS # BLD AUTO: 0.06 K/UL (ref 0–0.2)
BASOPHILS NFR BLD: 0.8 % (ref 0–1.9)
BILIRUB SERPL-MCNC: 0.6 MG/DL (ref 0.1–1)
BUN SERPL-MCNC: 12 MG/DL (ref 8–23)
CALCIUM SERPL-MCNC: 9.7 MG/DL (ref 8.7–10.5)
CHLORIDE SERPL-SCNC: 101 MMOL/L (ref 95–110)
CO2 SERPL-SCNC: 25 MMOL/L (ref 23–29)
CREAT SERPL-MCNC: 0.8 MG/DL (ref 0.5–1.4)
CREAT SERPL-MCNC: 0.8 MG/DL (ref 0.5–1.4)
CRP SERPL-MCNC: 2.2 MG/L (ref 0–8.2)
DIFFERENTIAL METHOD: ABNORMAL
EOSINOPHIL # BLD AUTO: 0.3 K/UL (ref 0–0.5)
EOSINOPHIL NFR BLD: 3.9 % (ref 0–8)
ERYTHROCYTE [DISTWIDTH] IN BLOOD BY AUTOMATED COUNT: 13.5 % (ref 11.5–14.5)
EST. GFR  (AFRICAN AMERICAN): >60 ML/MIN/1.73 M^2
EST. GFR  (AFRICAN AMERICAN): >60 ML/MIN/1.73 M^2
EST. GFR  (NON AFRICAN AMERICAN): >60 ML/MIN/1.73 M^2
EST. GFR  (NON AFRICAN AMERICAN): >60 ML/MIN/1.73 M^2
FERRITIN SERPL-MCNC: 14 NG/ML (ref 20–300)
FOLATE SERPL-MCNC: 12.4 NG/ML (ref 4–24)
GLUCOSE SERPL-MCNC: 145 MG/DL (ref 70–110)
HCT VFR BLD AUTO: 40.1 % (ref 40–54)
HGB BLD-MCNC: 12.4 G/DL (ref 14–18)
IMM GRANULOCYTES # BLD AUTO: 0.02 K/UL (ref 0–0.04)
IMM GRANULOCYTES NFR BLD AUTO: 0.3 % (ref 0–0.5)
IRON SERPL-MCNC: 65 UG/DL (ref 45–160)
LYMPHOCYTES # BLD AUTO: 2.5 K/UL (ref 1–4.8)
LYMPHOCYTES NFR BLD: 35 % (ref 18–48)
MCH RBC QN AUTO: 26.8 PG (ref 27–31)
MCHC RBC AUTO-ENTMCNC: 30.9 G/DL (ref 32–36)
MCV RBC AUTO: 87 FL (ref 82–98)
MONOCYTES # BLD AUTO: 0.4 K/UL (ref 0.3–1)
MONOCYTES NFR BLD: 5.7 % (ref 4–15)
NEUTROPHILS # BLD AUTO: 3.9 K/UL (ref 1.8–7.7)
NEUTROPHILS NFR BLD: 54.3 % (ref 38–73)
NRBC BLD-RTO: 0 /100 WBC
PLATELET # BLD AUTO: 238 K/UL (ref 150–350)
PMV BLD AUTO: 11.5 FL (ref 9.2–12.9)
POTASSIUM SERPL-SCNC: 4.4 MMOL/L (ref 3.5–5.1)
PROT SERPL-MCNC: 7.8 G/DL (ref 6–8.4)
RBC # BLD AUTO: 4.63 M/UL (ref 4.6–6.2)
SATURATED IRON: 12 % (ref 20–50)
SODIUM SERPL-SCNC: 137 MMOL/L (ref 136–145)
TOTAL IRON BINDING CAPACITY: 527 UG/DL (ref 250–450)
TRANSFERRIN SERPL-MCNC: 356 MG/DL (ref 200–375)
VIT B12 SERPL-MCNC: 592 PG/ML (ref 210–950)
WBC # BLD AUTO: 7.17 K/UL (ref 3.9–12.7)

## 2020-11-17 PROCEDURE — 82728 ASSAY OF FERRITIN: CPT

## 2020-11-17 PROCEDURE — 82607 VITAMIN B-12: CPT

## 2020-11-17 PROCEDURE — 86704 HEP B CORE ANTIBODY TOTAL: CPT

## 2020-11-17 PROCEDURE — 86803 HEPATITIS C AB TEST: CPT

## 2020-11-17 PROCEDURE — 82306 VITAMIN D 25 HYDROXY: CPT

## 2020-11-17 PROCEDURE — 83540 ASSAY OF IRON: CPT

## 2020-11-17 PROCEDURE — 82746 ASSAY OF FOLIC ACID SERUM: CPT

## 2020-11-17 PROCEDURE — 85025 COMPLETE CBC W/AUTO DIFF WBC: CPT

## 2020-11-17 PROCEDURE — 86140 C-REACTIVE PROTEIN: CPT

## 2020-11-17 PROCEDURE — 80053 COMPREHEN METABOLIC PANEL: CPT

## 2020-11-17 PROCEDURE — 87340 HEPATITIS B SURFACE AG IA: CPT

## 2020-11-17 PROCEDURE — 86706 HEP B SURFACE ANTIBODY: CPT

## 2020-11-17 PROCEDURE — 86790 VIRUS ANTIBODY NOS: CPT

## 2020-11-18 ENCOUNTER — PATIENT MESSAGE (OUTPATIENT)
Dept: FAMILY MEDICINE | Facility: CLINIC | Age: 60
End: 2020-11-18

## 2020-11-18 ENCOUNTER — HOSPITAL ENCOUNTER (OUTPATIENT)
Dept: RADIOLOGY | Facility: HOSPITAL | Age: 60
Discharge: HOME | End: 2020-11-18
Attending: INTERNAL MEDICINE
Payer: MEDICAID

## 2020-11-18 DIAGNOSIS — K63.9 DISEASE OF INTESTINE, UNSPECIFIED: ICD-10-CM

## 2020-11-18 LAB
HBV CORE AB SERPL QL IA: NEGATIVE
HBV SURFACE AB SER-ACNC: NEGATIVE M[IU]/ML
HBV SURFACE AG SERPL QL IA: NEGATIVE
HCV AB SERPL QL IA: NEGATIVE
HEPATITIS A ANTIBODY, IGG: POSITIVE

## 2020-11-18 PROCEDURE — A9698 NON-RAD CONTRAST MATERIALNOC: HCPCS | Performed by: INTERNAL MEDICINE

## 2020-11-18 PROCEDURE — 74177 CT ENTEROGRAPHY ABD_PELVIS WITH CONTRAST: ICD-10-PCS | Mod: 26,,, | Performed by: RADIOLOGY

## 2020-11-18 PROCEDURE — 25500020 PHARM REV CODE 255: Performed by: INTERNAL MEDICINE

## 2020-11-18 PROCEDURE — 25500020 PHARM REV CODE 255

## 2020-11-18 PROCEDURE — 74177 CT ABD & PELVIS W/CONTRAST: CPT | Mod: TC

## 2020-11-18 PROCEDURE — 74177 CT ABD & PELVIS W/CONTRAST: CPT | Mod: 26,,, | Performed by: RADIOLOGY

## 2020-11-18 RX ADMIN — BARIUM SULFATE 1350 ML: 1 SUSPENSION ORAL at 11:11

## 2020-11-18 RX ADMIN — IOHEXOL 75 ML: 350 INJECTION, SOLUTION INTRAVENOUS at 11:11

## 2020-11-25 DIAGNOSIS — D50.0 IRON DEFICIENCY ANEMIA DUE TO CHRONIC BLOOD LOSS: Primary | ICD-10-CM

## 2020-11-25 RX ORDER — METHYLPREDNISOLONE SOD SUCC 125 MG
125 VIAL (EA) INJECTION ONCE AS NEEDED
Status: CANCELLED | OUTPATIENT
Start: 2020-11-25

## 2020-11-25 RX ORDER — DIPHENHYDRAMINE HYDROCHLORIDE 50 MG/ML
50 INJECTION INTRAMUSCULAR; INTRAVENOUS ONCE AS NEEDED
Status: CANCELLED | OUTPATIENT
Start: 2020-11-25

## 2020-11-25 RX ORDER — SODIUM CHLORIDE 0.9 % (FLUSH) 0.9 %
10 SYRINGE (ML) INJECTION
Status: CANCELLED | OUTPATIENT
Start: 2020-11-25

## 2020-11-25 RX ORDER — EPINEPHRINE 0.3 MG/.3ML
0.3 INJECTION SUBCUTANEOUS ONCE AS NEEDED
Status: CANCELLED | OUTPATIENT
Start: 2020-11-25

## 2020-11-25 RX ORDER — SODIUM CHLORIDE 9 MG/ML
INJECTION, SOLUTION INTRAVENOUS CONTINUOUS
Status: CANCELLED | OUTPATIENT
Start: 2020-11-25

## 2020-11-25 RX ORDER — HEPARIN 100 UNIT/ML
5 SYRINGE INTRAVENOUS
Status: CANCELLED | OUTPATIENT
Start: 2020-11-25

## 2020-12-02 ENCOUNTER — HOSPITAL ENCOUNTER (OUTPATIENT)
Dept: CARDIOLOGY | Facility: HOSPITAL | Age: 60
Discharge: HOME | End: 2020-12-02
Attending: INTERNAL MEDICINE
Payer: MEDICAID

## 2020-12-02 DIAGNOSIS — I20.89 ANGINA OF EFFORT: ICD-10-CM

## 2020-12-02 LAB
CV PHARM DOSE: 0.4 MG
CV STRESS BASE HR: 60 BPM
DIASTOLIC BLOOD PRESSURE: 72 MMHG
END DIASTOLIC INDEX-HIGH: 170 ML/M2
END SYSTOLIC INDEX-HIGH: 70 ML/M2
OHS CV CPX 85 PERCENT MAX PREDICTED HEART RATE MALE: 134
OHS CV CPX MAX PREDICTED HEART RATE: 158
OHS CV CPX PATIENT IS FEMALE: 0
OHS CV CPX PATIENT IS MALE: 1
OHS CV CPX PEAK DIASTOLIC BLOOD PRESSURE: 70 MMHG
OHS CV CPX PEAK HEAR RATE: 89 BPM
OHS CV CPX PEAK RATE PRESSURE PRODUCT: NORMAL
OHS CV CPX PEAK SYSTOLIC BLOOD PRESSURE: 136 MMHG
OHS CV CPX PERCENT MAX PREDICTED HEART RATE ACHIEVED: 56
OHS CV CPX RATE PRESSURE PRODUCT PRESENTING: 7980
RETIRED EF AND QEF - SEE NOTES: 51 %
SYSTOLIC BLOOD PRESSURE: 133 MMHG

## 2020-12-02 PROCEDURE — 78452 HT MUSCLE IMAGE SPECT MULT: CPT | Mod: 26,,, | Performed by: INTERNAL MEDICINE

## 2020-12-02 PROCEDURE — 78452 STRESS TEST WITH MYOCARDIAL PERFUSION (CUPID ONLY): ICD-10-PCS | Mod: 26,,, | Performed by: INTERNAL MEDICINE

## 2020-12-02 PROCEDURE — 93016 CV STRESS TEST SUPVJ ONLY: CPT | Mod: ,,, | Performed by: INTERNAL MEDICINE

## 2020-12-02 PROCEDURE — 93018 CV STRESS TEST I&R ONLY: CPT | Mod: ,,, | Performed by: INTERNAL MEDICINE

## 2020-12-02 PROCEDURE — 63600175 PHARM REV CODE 636 W HCPCS: Performed by: INTERNAL MEDICINE

## 2020-12-02 PROCEDURE — A9502 TC99M TETROFOSMIN: HCPCS

## 2020-12-02 PROCEDURE — 93016 STRESS TEST WITH MYOCARDIAL PERFUSION (CUPID ONLY): ICD-10-PCS | Mod: ,,, | Performed by: INTERNAL MEDICINE

## 2020-12-02 PROCEDURE — 93018 STRESS TEST WITH MYOCARDIAL PERFUSION (CUPID ONLY): ICD-10-PCS | Mod: ,,, | Performed by: INTERNAL MEDICINE

## 2020-12-02 RX ORDER — REGADENOSON 0.08 MG/ML
0.4 INJECTION, SOLUTION INTRAVENOUS ONCE
Status: COMPLETED | OUTPATIENT
Start: 2020-12-02 | End: 2020-12-02

## 2020-12-02 RX ADMIN — REGADENOSON 0.4 MG: 0.08 INJECTION, SOLUTION INTRAVENOUS at 08:12

## 2020-12-03 ENCOUNTER — TELEPHONE (OUTPATIENT)
Dept: INFUSION THERAPY | Facility: HOSPITAL | Age: 60
End: 2020-12-03

## 2020-12-03 ENCOUNTER — TELEPHONE (OUTPATIENT)
Dept: CARDIOLOGY | Facility: CLINIC | Age: 60
End: 2020-12-03

## 2020-12-03 NOTE — TELEPHONE ENCOUNTER
Kelly Parks S Staff 3 hours ago (1:17 PM)     Contacted patient to schedule his Injectafer x2.  Patient requested for me to call him back at the beginning of January since he has been out a month of work already.  I advised him I would do so and advise the  Office of his request as well.   Thank you    Routing comment

## 2020-12-03 NOTE — TELEPHONE ENCOUNTER
Dr Maria has reviewed the results of the stress test and patient notified that no myocardial ischemia was noted.

## 2021-01-04 ENCOUNTER — PATIENT MESSAGE (OUTPATIENT)
Dept: ADMINISTRATIVE | Facility: HOSPITAL | Age: 61
End: 2021-01-04

## 2021-01-08 ENCOUNTER — TELEPHONE (OUTPATIENT)
Dept: INFUSION THERAPY | Facility: HOSPITAL | Age: 61
End: 2021-01-08

## 2021-01-31 RX ORDER — PANTOPRAZOLE SODIUM 40 MG/1
40 TABLET, DELAYED RELEASE ORAL DAILY
Qty: 30 TABLET | Refills: 9 | Status: CANCELLED | OUTPATIENT
Start: 2021-01-31

## 2021-02-03 ENCOUNTER — PATIENT MESSAGE (OUTPATIENT)
Dept: ENDOSCOPY | Facility: HOSPITAL | Age: 61
End: 2021-02-03

## 2021-02-03 RX ORDER — PANTOPRAZOLE SODIUM 40 MG/1
40 TABLET, DELAYED RELEASE ORAL DAILY
Qty: 30 TABLET | Refills: 9 | Status: CANCELLED | OUTPATIENT
Start: 2021-02-03

## 2021-02-08 ENCOUNTER — TELEPHONE (OUTPATIENT)
Dept: GASTROENTEROLOGY | Facility: CLINIC | Age: 61
End: 2021-02-08

## 2021-02-09 ENCOUNTER — TELEPHONE (OUTPATIENT)
Dept: INFUSION THERAPY | Facility: HOSPITAL | Age: 61
End: 2021-02-09

## 2021-02-10 DIAGNOSIS — E11.9 TYPE 2 DIABETES MELLITUS WITHOUT COMPLICATION, UNSPECIFIED WHETHER LONG TERM INSULIN USE: ICD-10-CM

## 2021-02-11 ENCOUNTER — TELEPHONE (OUTPATIENT)
Dept: GASTROENTEROLOGY | Facility: CLINIC | Age: 61
End: 2021-02-11

## 2021-02-19 ENCOUNTER — INFUSION (OUTPATIENT)
Dept: INFUSION THERAPY | Facility: HOSPITAL | Age: 61
End: 2021-02-19
Attending: INTERNAL MEDICINE
Payer: MEDICAID

## 2021-02-19 VITALS
SYSTOLIC BLOOD PRESSURE: 131 MMHG | BODY MASS INDEX: 25.42 KG/M2 | OXYGEN SATURATION: 99 % | WEIGHT: 158.19 LBS | TEMPERATURE: 97 F | RESPIRATION RATE: 18 BRPM | DIASTOLIC BLOOD PRESSURE: 78 MMHG | HEART RATE: 69 BPM | HEIGHT: 66 IN

## 2021-02-19 DIAGNOSIS — D50.0 IRON DEFICIENCY ANEMIA DUE TO CHRONIC BLOOD LOSS: Primary | ICD-10-CM

## 2021-02-19 PROCEDURE — 96365 THER/PROPH/DIAG IV INF INIT: CPT

## 2021-02-19 PROCEDURE — 25000003 PHARM REV CODE 250: Performed by: INTERNAL MEDICINE

## 2021-02-19 PROCEDURE — 63600175 PHARM REV CODE 636 W HCPCS: Mod: JG | Performed by: INTERNAL MEDICINE

## 2021-02-19 RX ORDER — SODIUM CHLORIDE 0.9 % (FLUSH) 0.9 %
10 SYRINGE (ML) INJECTION
Status: CANCELLED | OUTPATIENT
Start: 2021-02-26

## 2021-02-19 RX ORDER — METHYLPREDNISOLONE SOD SUCC 125 MG
125 VIAL (EA) INJECTION ONCE AS NEEDED
Status: CANCELLED | OUTPATIENT
Start: 2021-02-26

## 2021-02-19 RX ORDER — SODIUM CHLORIDE 0.9 % (FLUSH) 0.9 %
10 SYRINGE (ML) INJECTION
Status: DISCONTINUED | OUTPATIENT
Start: 2021-02-19 | End: 2021-02-19 | Stop reason: HOSPADM

## 2021-02-19 RX ORDER — HEPARIN 100 UNIT/ML
5 SYRINGE INTRAVENOUS
Status: CANCELLED | OUTPATIENT
Start: 2021-02-26

## 2021-02-19 RX ORDER — SODIUM CHLORIDE 9 MG/ML
INJECTION, SOLUTION INTRAVENOUS CONTINUOUS
Status: DISCONTINUED | OUTPATIENT
Start: 2021-02-19 | End: 2021-02-19 | Stop reason: HOSPADM

## 2021-02-19 RX ORDER — DIPHENHYDRAMINE HYDROCHLORIDE 50 MG/ML
50 INJECTION INTRAMUSCULAR; INTRAVENOUS ONCE AS NEEDED
Status: CANCELLED | OUTPATIENT
Start: 2021-02-26

## 2021-02-19 RX ORDER — SODIUM CHLORIDE 9 MG/ML
INJECTION, SOLUTION INTRAVENOUS CONTINUOUS
Status: CANCELLED | OUTPATIENT
Start: 2021-02-26

## 2021-02-19 RX ORDER — HEPARIN 100 UNIT/ML
5 SYRINGE INTRAVENOUS
Status: DISCONTINUED | OUTPATIENT
Start: 2021-02-19 | End: 2021-02-19 | Stop reason: HOSPADM

## 2021-02-19 RX ORDER — EPINEPHRINE 0.3 MG/.3ML
0.3 INJECTION SUBCUTANEOUS ONCE AS NEEDED
Status: CANCELLED | OUTPATIENT
Start: 2021-02-26

## 2021-02-19 RX ADMIN — FERRIC CARBOXYMALTOSE INJECTION 750 MG: 50 INJECTION, SOLUTION INTRAVENOUS at 11:02

## 2021-02-26 ENCOUNTER — INFUSION (OUTPATIENT)
Dept: INFUSION THERAPY | Facility: HOSPITAL | Age: 61
End: 2021-02-26
Attending: INTERNAL MEDICINE
Payer: MEDICAID

## 2021-02-26 VITALS
HEART RATE: 75 BPM | TEMPERATURE: 99 F | RESPIRATION RATE: 20 BRPM | DIASTOLIC BLOOD PRESSURE: 81 MMHG | HEIGHT: 66 IN | SYSTOLIC BLOOD PRESSURE: 126 MMHG | WEIGHT: 158 LBS | BODY MASS INDEX: 25.39 KG/M2

## 2021-02-26 DIAGNOSIS — D50.0 IRON DEFICIENCY ANEMIA DUE TO CHRONIC BLOOD LOSS: Primary | ICD-10-CM

## 2021-02-26 PROCEDURE — 96365 THER/PROPH/DIAG IV INF INIT: CPT

## 2021-02-26 PROCEDURE — 25000003 PHARM REV CODE 250: Performed by: INTERNAL MEDICINE

## 2021-02-26 PROCEDURE — 63600175 PHARM REV CODE 636 W HCPCS: Mod: JG | Performed by: INTERNAL MEDICINE

## 2021-02-26 RX ORDER — SODIUM CHLORIDE 9 MG/ML
INJECTION, SOLUTION INTRAVENOUS CONTINUOUS
Status: CANCELLED | OUTPATIENT
Start: 2021-02-26

## 2021-02-26 RX ORDER — METHYLPREDNISOLONE SOD SUCC 125 MG
125 VIAL (EA) INJECTION ONCE AS NEEDED
Status: CANCELLED | OUTPATIENT
Start: 2021-02-26

## 2021-02-26 RX ORDER — HEPARIN 100 UNIT/ML
5 SYRINGE INTRAVENOUS
Status: CANCELLED | OUTPATIENT
Start: 2021-02-26

## 2021-02-26 RX ORDER — EPINEPHRINE 0.3 MG/.3ML
0.3 INJECTION SUBCUTANEOUS ONCE AS NEEDED
Status: CANCELLED | OUTPATIENT
Start: 2021-02-26

## 2021-02-26 RX ORDER — SODIUM CHLORIDE 0.9 % (FLUSH) 0.9 %
10 SYRINGE (ML) INJECTION
Status: CANCELLED | OUTPATIENT
Start: 2021-02-26

## 2021-02-26 RX ORDER — DIPHENHYDRAMINE HYDROCHLORIDE 50 MG/ML
50 INJECTION INTRAMUSCULAR; INTRAVENOUS ONCE AS NEEDED
Status: CANCELLED | OUTPATIENT
Start: 2021-02-26

## 2021-02-26 RX ADMIN — FERRIC CARBOXYMALTOSE INJECTION 750 MG: 50 INJECTION, SOLUTION INTRAVENOUS at 11:02

## 2021-03-01 DIAGNOSIS — I25.119 CORONARY ARTERY DISEASE INVOLVING NATIVE CORONARY ARTERY OF NATIVE HEART WITH ANGINA PECTORIS: Primary | ICD-10-CM

## 2021-03-01 RX ORDER — CLOPIDOGREL BISULFATE 75 MG/1
75 TABLET ORAL DAILY
Qty: 90 TABLET | Refills: 3 | Status: SHIPPED | OUTPATIENT
Start: 2021-03-01 | End: 2021-07-05

## 2021-03-31 ENCOUNTER — PATIENT MESSAGE (OUTPATIENT)
Dept: GASTROENTEROLOGY | Facility: CLINIC | Age: 61
End: 2021-03-31

## 2021-04-01 ENCOUNTER — PATIENT MESSAGE (OUTPATIENT)
Dept: ADMINISTRATIVE | Facility: HOSPITAL | Age: 61
End: 2021-04-01

## 2021-04-06 ENCOUNTER — PATIENT MESSAGE (OUTPATIENT)
Dept: ENDOSCOPY | Facility: HOSPITAL | Age: 61
End: 2021-04-06

## 2021-04-21 ENCOUNTER — PATIENT MESSAGE (OUTPATIENT)
Dept: ENDOSCOPY | Facility: HOSPITAL | Age: 61
End: 2021-04-21

## 2021-04-23 ENCOUNTER — PATIENT MESSAGE (OUTPATIENT)
Dept: ENDOSCOPY | Facility: HOSPITAL | Age: 61
End: 2021-04-23

## 2021-04-30 ENCOUNTER — LAB VISIT (OUTPATIENT)
Dept: PRIMARY CARE CLINIC | Facility: CLINIC | Age: 61
End: 2021-04-30
Payer: MEDICAID

## 2021-04-30 DIAGNOSIS — Z01.812 PRE-PROCEDURE LAB EXAM: ICD-10-CM

## 2021-04-30 PROCEDURE — U0005 INFEC AGEN DETEC AMPLI PROBE: HCPCS | Performed by: INTERNAL MEDICINE

## 2021-04-30 PROCEDURE — U0003 INFECTIOUS AGENT DETECTION BY NUCLEIC ACID (DNA OR RNA); SEVERE ACUTE RESPIRATORY SYNDROME CORONAVIRUS 2 (SARS-COV-2) (CORONAVIRUS DISEASE [COVID-19]), AMPLIFIED PROBE TECHNIQUE, MAKING USE OF HIGH THROUGHPUT TECHNOLOGIES AS DESCRIBED BY CMS-2020-01-R: HCPCS | Performed by: INTERNAL MEDICINE

## 2021-05-01 LAB — SARS-COV-2 RNA RESP QL NAA+PROBE: NOT DETECTED

## 2021-05-03 ENCOUNTER — ANESTHESIA (OUTPATIENT)
Dept: ENDOSCOPY | Facility: HOSPITAL | Age: 61
End: 2021-05-03
Payer: MEDICAID

## 2021-05-03 ENCOUNTER — HOSPITAL ENCOUNTER (OUTPATIENT)
Facility: HOSPITAL | Age: 61
Discharge: HOME | End: 2021-05-03
Attending: INTERNAL MEDICINE | Admitting: INTERNAL MEDICINE
Payer: MEDICAID

## 2021-05-03 ENCOUNTER — ANESTHESIA EVENT (OUTPATIENT)
Dept: ENDOSCOPY | Facility: HOSPITAL | Age: 61
End: 2021-05-03
Payer: MEDICAID

## 2021-05-03 DIAGNOSIS — Z87.19 HX OF CHRONIC ULCERATIVE COLITIS: Primary | ICD-10-CM

## 2021-05-03 PROCEDURE — 63600175 PHARM REV CODE 636 W HCPCS: Performed by: NURSE ANESTHETIST, CERTIFIED REGISTERED

## 2021-05-03 PROCEDURE — 88305 TISSUE EXAM BY PATHOLOGIST: CPT | Mod: 26,,, | Performed by: PATHOLOGY

## 2021-05-03 PROCEDURE — 45380 COLONOSCOPY AND BIOPSY: CPT | Mod: ,,, | Performed by: INTERNAL MEDICINE

## 2021-05-03 PROCEDURE — 25000003 PHARM REV CODE 250: Performed by: NURSE ANESTHETIST, CERTIFIED REGISTERED

## 2021-05-03 PROCEDURE — 00811 ANES LWR INTST NDSC NOS: CPT | Performed by: INTERNAL MEDICINE

## 2021-05-03 PROCEDURE — D9220A PRA ANESTHESIA: Mod: ANES,,, | Performed by: ANESTHESIOLOGY

## 2021-05-03 PROCEDURE — 25000003 PHARM REV CODE 250: Performed by: INTERNAL MEDICINE

## 2021-05-03 PROCEDURE — 45385 COLONOSCOPY W/LESION REMOVAL: CPT

## 2021-05-03 PROCEDURE — 88305 TISSUE EXAM BY PATHOLOGIST: ICD-10-PCS | Mod: 26,,, | Performed by: PATHOLOGY

## 2021-05-03 PROCEDURE — D9220A PRA ANESTHESIA: ICD-10-PCS | Mod: CRNA,,, | Performed by: NURSE ANESTHETIST, CERTIFIED REGISTERED

## 2021-05-03 PROCEDURE — D9220A PRA ANESTHESIA: Mod: CRNA,,, | Performed by: NURSE ANESTHETIST, CERTIFIED REGISTERED

## 2021-05-03 PROCEDURE — 37000008 HC ANESTHESIA 1ST 15 MINUTES: Performed by: INTERNAL MEDICINE

## 2021-05-03 PROCEDURE — 88305 TISSUE EXAM BY PATHOLOGIST: CPT | Performed by: PATHOLOGY

## 2021-05-03 PROCEDURE — 45380 COLONOSCOPY AND BIOPSY: CPT | Performed by: INTERNAL MEDICINE

## 2021-05-03 PROCEDURE — D9220A PRA ANESTHESIA: ICD-10-PCS | Mod: ANES,,, | Performed by: ANESTHESIOLOGY

## 2021-05-03 PROCEDURE — 37000009 HC ANESTHESIA EA ADD 15 MINS: Performed by: INTERNAL MEDICINE

## 2021-05-03 PROCEDURE — 27201012 HC FORCEPS, HOT/COLD, DISP: Performed by: INTERNAL MEDICINE

## 2021-05-03 PROCEDURE — 45380 PR COLONOSCOPY,BIOPSY: ICD-10-PCS | Mod: ,,, | Performed by: INTERNAL MEDICINE

## 2021-05-03 RX ORDER — LIDOCAINE HCL/PF 100 MG/5ML
SYRINGE (ML) INTRAVENOUS
Status: DISCONTINUED | OUTPATIENT
Start: 2021-05-03 | End: 2021-05-03

## 2021-05-03 RX ORDER — SODIUM CHLORIDE 9 MG/ML
INJECTION, SOLUTION INTRAVENOUS CONTINUOUS
Status: DISCONTINUED | OUTPATIENT
Start: 2021-05-03 | End: 2021-05-03 | Stop reason: HOSPADM

## 2021-05-03 RX ORDER — PROPOFOL 10 MG/ML
INJECTION, EMULSION INTRAVENOUS
Status: DISCONTINUED | OUTPATIENT
Start: 2021-05-03 | End: 2021-05-03

## 2021-05-03 RX ADMIN — PROPOFOL 100 MG: 10 INJECTION, EMULSION INTRAVENOUS at 10:05

## 2021-05-03 RX ADMIN — PROPOFOL 30 MG: 10 INJECTION, EMULSION INTRAVENOUS at 10:05

## 2021-05-03 RX ADMIN — LIDOCAINE HYDROCHLORIDE 70 MG: 20 INJECTION INTRAVENOUS at 10:05

## 2021-05-03 RX ADMIN — SODIUM CHLORIDE: 0.9 INJECTION, SOLUTION INTRAVENOUS at 09:05

## 2021-05-04 VITALS
RESPIRATION RATE: 16 BRPM | TEMPERATURE: 98 F | BODY MASS INDEX: 25.18 KG/M2 | DIASTOLIC BLOOD PRESSURE: 69 MMHG | WEIGHT: 156 LBS | HEART RATE: 70 BPM | OXYGEN SATURATION: 99 % | SYSTOLIC BLOOD PRESSURE: 138 MMHG

## 2021-05-05 LAB
FINAL PATHOLOGIC DIAGNOSIS: NORMAL
GROSS: NORMAL
Lab: NORMAL

## 2021-06-03 ENCOUNTER — PATIENT MESSAGE (OUTPATIENT)
Dept: FAMILY MEDICINE | Facility: CLINIC | Age: 61
End: 2021-06-03

## 2021-06-04 ENCOUNTER — OFFICE VISIT (OUTPATIENT)
Dept: FAMILY MEDICINE | Facility: CLINIC | Age: 61
End: 2021-06-04
Payer: MEDICAID

## 2021-06-04 VITALS
OXYGEN SATURATION: 98 % | WEIGHT: 155.19 LBS | BODY MASS INDEX: 24.94 KG/M2 | HEIGHT: 66 IN | SYSTOLIC BLOOD PRESSURE: 128 MMHG | HEART RATE: 90 BPM | DIASTOLIC BLOOD PRESSURE: 82 MMHG | TEMPERATURE: 98 F

## 2021-06-04 DIAGNOSIS — E11.69 TYPE 2 DIABETES MELLITUS WITH OTHER SPECIFIED COMPLICATION, WITHOUT LONG-TERM CURRENT USE OF INSULIN: ICD-10-CM

## 2021-06-04 DIAGNOSIS — G89.29 CHRONIC PAIN OF LEFT KNEE: Primary | ICD-10-CM

## 2021-06-04 DIAGNOSIS — M25.562 CHRONIC PAIN OF LEFT KNEE: Primary | ICD-10-CM

## 2021-06-04 PROBLEM — E11.9 DIABETES MELLITUS, TYPE 2: Status: ACTIVE | Noted: 2017-02-22

## 2021-06-04 PROCEDURE — 99214 OFFICE O/P EST MOD 30 MIN: CPT | Mod: S$PBB,,, | Performed by: FAMILY MEDICINE

## 2021-06-04 PROCEDURE — 99999 PR PBB SHADOW E&M-EST. PATIENT-LVL IV: ICD-10-PCS | Mod: PBBFAC,,, | Performed by: FAMILY MEDICINE

## 2021-06-04 PROCEDURE — 99214 OFFICE O/P EST MOD 30 MIN: CPT | Mod: PBBFAC,PO | Performed by: FAMILY MEDICINE

## 2021-06-04 PROCEDURE — 99999 PR PBB SHADOW E&M-EST. PATIENT-LVL IV: CPT | Mod: PBBFAC,,, | Performed by: FAMILY MEDICINE

## 2021-06-04 PROCEDURE — 99214 PR OFFICE/OUTPT VISIT, EST, LEVL IV, 30-39 MIN: ICD-10-PCS | Mod: S$PBB,,, | Performed by: FAMILY MEDICINE

## 2021-06-04 RX ORDER — DICLOFENAC SODIUM 10 MG/G
2 GEL TOPICAL 4 TIMES DAILY PRN
Qty: 100 G | Refills: 5 | Status: SHIPPED | OUTPATIENT
Start: 2021-06-04 | End: 2022-07-22 | Stop reason: SDUPTHER

## 2021-06-07 ENCOUNTER — HOSPITAL ENCOUNTER (OUTPATIENT)
Dept: RADIOLOGY | Facility: HOSPITAL | Age: 61
Discharge: HOME | End: 2021-06-07
Attending: FAMILY MEDICINE
Payer: MEDICAID

## 2021-06-07 DIAGNOSIS — M25.562 CHRONIC PAIN OF LEFT KNEE: ICD-10-CM

## 2021-06-07 DIAGNOSIS — G89.29 CHRONIC PAIN OF LEFT KNEE: ICD-10-CM

## 2021-06-07 PROCEDURE — 73562 X-RAY EXAM OF KNEE 3: CPT | Mod: 26,50,, | Performed by: RADIOLOGY

## 2021-06-07 PROCEDURE — 73562 XR KNEE 3 VIEW BILATERAL: ICD-10-PCS | Mod: 26,50,, | Performed by: RADIOLOGY

## 2021-06-07 PROCEDURE — 73562 X-RAY EXAM OF KNEE 3: CPT | Mod: TC,50,PN

## 2021-06-09 ENCOUNTER — LAB VISIT (OUTPATIENT)
Dept: LAB | Facility: HOSPITAL | Age: 61
End: 2021-06-09
Attending: FAMILY MEDICINE
Payer: MEDICAID

## 2021-06-09 ENCOUNTER — PATIENT MESSAGE (OUTPATIENT)
Dept: FAMILY MEDICINE | Facility: CLINIC | Age: 61
End: 2021-06-09

## 2021-06-09 DIAGNOSIS — G89.29 CHRONIC PAIN OF LEFT KNEE: ICD-10-CM

## 2021-06-09 DIAGNOSIS — Z12.11 COLON CANCER SCREENING: ICD-10-CM

## 2021-06-09 DIAGNOSIS — E78.2 MIXED HYPERLIPIDEMIA: ICD-10-CM

## 2021-06-09 DIAGNOSIS — Z00.00 ROUTINE GENERAL MEDICAL EXAMINATION AT A HEALTH CARE FACILITY: ICD-10-CM

## 2021-06-09 DIAGNOSIS — M25.562 CHRONIC PAIN OF LEFT KNEE: ICD-10-CM

## 2021-06-09 DIAGNOSIS — I25.10 CORONARY ARTERY DISEASE, ANGINA PRESENCE UNSPECIFIED, UNSPECIFIED VESSEL OR LESION TYPE, UNSPECIFIED WHETHER NATIVE OR TRANSPLANTED HEART: ICD-10-CM

## 2021-06-09 DIAGNOSIS — I10 ESSENTIAL HYPERTENSION: ICD-10-CM

## 2021-06-09 DIAGNOSIS — K51.919 ULCERATIVE COLITIS WITH COMPLICATION, UNSPECIFIED LOCATION: ICD-10-CM

## 2021-06-09 LAB
BASOPHILS # BLD AUTO: 0.09 K/UL (ref 0–0.2)
BASOPHILS NFR BLD: 1.1 % (ref 0–1.9)
DIFFERENTIAL METHOD: ABNORMAL
EOSINOPHIL # BLD AUTO: 0.4 K/UL (ref 0–0.5)
EOSINOPHIL NFR BLD: 4.5 % (ref 0–8)
ERYTHROCYTE [DISTWIDTH] IN BLOOD BY AUTOMATED COUNT: 12.7 % (ref 11.5–14.5)
HCT VFR BLD AUTO: 39 % (ref 40–54)
HGB BLD-MCNC: 12.8 G/DL (ref 14–18)
IMM GRANULOCYTES # BLD AUTO: 0.04 K/UL (ref 0–0.04)
IMM GRANULOCYTES NFR BLD AUTO: 0.5 % (ref 0–0.5)
LYMPHOCYTES # BLD AUTO: 2.2 K/UL (ref 1–4.8)
LYMPHOCYTES NFR BLD: 27.4 % (ref 18–48)
MCH RBC QN AUTO: 29.8 PG (ref 27–31)
MCHC RBC AUTO-ENTMCNC: 32.8 G/DL (ref 32–36)
MCV RBC AUTO: 91 FL (ref 82–98)
MONOCYTES # BLD AUTO: 0.7 K/UL (ref 0.3–1)
MONOCYTES NFR BLD: 8.1 % (ref 4–15)
NEUTROPHILS # BLD AUTO: 4.8 K/UL (ref 1.8–7.7)
NEUTROPHILS NFR BLD: 58.4 % (ref 38–73)
NRBC BLD-RTO: 0 /100 WBC
PLATELET # BLD AUTO: 257 K/UL (ref 150–450)
PMV BLD AUTO: 11.1 FL (ref 9.2–12.9)
RBC # BLD AUTO: 4.3 M/UL (ref 4.6–6.2)
WBC # BLD AUTO: 8.14 K/UL (ref 3.9–12.7)

## 2021-06-09 PROCEDURE — 85025 COMPLETE CBC W/AUTO DIFF WBC: CPT | Performed by: FAMILY MEDICINE

## 2021-06-09 PROCEDURE — 86703 HIV-1/HIV-2 1 RESULT ANTBDY: CPT | Performed by: FAMILY MEDICINE

## 2021-06-09 PROCEDURE — 84443 ASSAY THYROID STIM HORMONE: CPT | Performed by: FAMILY MEDICINE

## 2021-06-09 PROCEDURE — 80053 COMPREHEN METABOLIC PANEL: CPT | Performed by: FAMILY MEDICINE

## 2021-06-09 PROCEDURE — 83036 HEMOGLOBIN GLYCOSYLATED A1C: CPT | Performed by: FAMILY MEDICINE

## 2021-06-09 PROCEDURE — 80061 LIPID PANEL: CPT | Performed by: FAMILY MEDICINE

## 2021-06-09 PROCEDURE — 36415 COLL VENOUS BLD VENIPUNCTURE: CPT | Mod: PN | Performed by: FAMILY MEDICINE

## 2021-06-10 LAB
ALBUMIN SERPL BCP-MCNC: 4 G/DL (ref 3.5–5.2)
ALP SERPL-CCNC: 84 U/L (ref 55–135)
ALT SERPL W/O P-5'-P-CCNC: 17 U/L (ref 10–44)
ANION GAP SERPL CALC-SCNC: 14 MMOL/L (ref 8–16)
AST SERPL-CCNC: 12 U/L (ref 10–40)
BILIRUB SERPL-MCNC: 0.5 MG/DL (ref 0.1–1)
BUN SERPL-MCNC: 13 MG/DL (ref 8–23)
CALCIUM SERPL-MCNC: 9.5 MG/DL (ref 8.7–10.5)
CHLORIDE SERPL-SCNC: 100 MMOL/L (ref 95–110)
CHOLEST SERPL-MCNC: 231 MG/DL (ref 120–199)
CHOLEST/HDLC SERPL: 6.2 {RATIO} (ref 2–5)
CO2 SERPL-SCNC: 21 MMOL/L (ref 23–29)
CREAT SERPL-MCNC: 0.8 MG/DL (ref 0.5–1.4)
EST. GFR  (AFRICAN AMERICAN): >60 ML/MIN/1.73 M^2
EST. GFR  (NON AFRICAN AMERICAN): >60 ML/MIN/1.73 M^2
ESTIMATED AVG GLUCOSE: 171 MG/DL (ref 68–131)
GLUCOSE SERPL-MCNC: 136 MG/DL (ref 70–110)
HBA1C MFR BLD: 7.6 % (ref 4–5.6)
HDLC SERPL-MCNC: 37 MG/DL (ref 40–75)
HDLC SERPL: 16 % (ref 20–50)
HIV 1+2 AB+HIV1 P24 AG SERPL QL IA: NEGATIVE
LDLC SERPL CALC-MCNC: ABNORMAL MG/DL (ref 63–159)
NONHDLC SERPL-MCNC: 194 MG/DL
POTASSIUM SERPL-SCNC: 4.2 MMOL/L (ref 3.5–5.1)
PROT SERPL-MCNC: 7.5 G/DL (ref 6–8.4)
SODIUM SERPL-SCNC: 135 MMOL/L (ref 136–145)
TRIGL SERPL-MCNC: 448 MG/DL (ref 30–150)
TSH SERPL DL<=0.005 MIU/L-ACNC: 2.52 UIU/ML (ref 0.4–4)

## 2021-06-15 ENCOUNTER — PATIENT MESSAGE (OUTPATIENT)
Dept: FAMILY MEDICINE | Facility: CLINIC | Age: 61
End: 2021-06-15

## 2021-07-06 DIAGNOSIS — I25.119 CORONARY ARTERY DISEASE INVOLVING NATIVE CORONARY ARTERY OF NATIVE HEART WITH ANGINA PECTORIS: ICD-10-CM

## 2021-07-06 RX ORDER — CLOPIDOGREL BISULFATE 75 MG/1
75 TABLET ORAL DAILY
Qty: 30 TABLET | Refills: 4 | Status: SHIPPED | OUTPATIENT
Start: 2021-07-06 | End: 2022-07-08

## 2021-07-28 DIAGNOSIS — E78.2 MIXED HYPERLIPIDEMIA: Primary | ICD-10-CM

## 2021-07-28 RX ORDER — ROSUVASTATIN CALCIUM 20 MG/1
40 TABLET, COATED ORAL DAILY
Qty: 180 TABLET | Refills: 3 | Status: SHIPPED | OUTPATIENT
Start: 2021-07-28 | End: 2022-07-22 | Stop reason: SDUPTHER

## 2021-08-11 ENCOUNTER — PATIENT MESSAGE (OUTPATIENT)
Dept: ADMINISTRATIVE | Facility: OTHER | Age: 61
End: 2021-08-11

## 2021-09-14 LAB
LEFT EYE DM RETINOPATHY: POSITIVE
RIGHT EYE DM RETINOPATHY: POSITIVE

## 2021-09-15 ENCOUNTER — PATIENT MESSAGE (OUTPATIENT)
Dept: FAMILY MEDICINE | Facility: CLINIC | Age: 61
End: 2021-09-15

## 2021-09-15 ENCOUNTER — TELEPHONE (OUTPATIENT)
Dept: FAMILY MEDICINE | Facility: CLINIC | Age: 61
End: 2021-09-15

## 2021-09-15 DIAGNOSIS — E11.69 TYPE 2 DIABETES MELLITUS WITH OTHER SPECIFIED COMPLICATION, WITHOUT LONG-TERM CURRENT USE OF INSULIN: Primary | ICD-10-CM

## 2021-09-15 DIAGNOSIS — Z12.5 SCREENING FOR MALIGNANT NEOPLASM OF PROSTATE: ICD-10-CM

## 2021-09-16 ENCOUNTER — PATIENT MESSAGE (OUTPATIENT)
Dept: FAMILY MEDICINE | Facility: CLINIC | Age: 61
End: 2021-09-16

## 2021-09-20 LAB
LEFT EYE DM RETINOPATHY: POSITIVE
RIGHT EYE DM RETINOPATHY: POSITIVE

## 2021-09-21 ENCOUNTER — LAB VISIT (OUTPATIENT)
Dept: LAB | Facility: HOSPITAL | Age: 61
End: 2021-09-21
Attending: FAMILY MEDICINE
Payer: MEDICAID

## 2021-09-21 DIAGNOSIS — Z12.5 SCREENING FOR MALIGNANT NEOPLASM OF PROSTATE: ICD-10-CM

## 2021-09-21 DIAGNOSIS — E11.69 TYPE 2 DIABETES MELLITUS WITH OTHER SPECIFIED COMPLICATION, WITHOUT LONG-TERM CURRENT USE OF INSULIN: ICD-10-CM

## 2021-09-21 LAB
ALBUMIN SERPL BCP-MCNC: 4 G/DL (ref 3.5–5.2)
ALP SERPL-CCNC: 70 U/L (ref 55–135)
ALT SERPL W/O P-5'-P-CCNC: 23 U/L (ref 10–44)
ANION GAP SERPL CALC-SCNC: 11 MMOL/L (ref 8–16)
AST SERPL-CCNC: 18 U/L (ref 10–40)
BASOPHILS # BLD AUTO: 0.07 K/UL (ref 0–0.2)
BASOPHILS NFR BLD: 0.8 % (ref 0–1.9)
BILIRUB SERPL-MCNC: 0.5 MG/DL (ref 0.1–1)
BUN SERPL-MCNC: 12 MG/DL (ref 8–23)
CALCIUM SERPL-MCNC: 9.1 MG/DL (ref 8.7–10.5)
CHLORIDE SERPL-SCNC: 102 MMOL/L (ref 95–110)
CHOLEST SERPL-MCNC: 155 MG/DL (ref 120–199)
CHOLEST/HDLC SERPL: 4 {RATIO} (ref 2–5)
CO2 SERPL-SCNC: 23 MMOL/L (ref 23–29)
COMPLEXED PSA SERPL-MCNC: 0.67 NG/ML (ref 0–4)
CREAT SERPL-MCNC: 0.7 MG/DL (ref 0.5–1.4)
DIFFERENTIAL METHOD: ABNORMAL
EOSINOPHIL # BLD AUTO: 0.3 K/UL (ref 0–0.5)
EOSINOPHIL NFR BLD: 3 % (ref 0–8)
ERYTHROCYTE [DISTWIDTH] IN BLOOD BY AUTOMATED COUNT: 12.5 % (ref 11.5–14.5)
EST. GFR  (AFRICAN AMERICAN): >60 ML/MIN/1.73 M^2
EST. GFR  (NON AFRICAN AMERICAN): >60 ML/MIN/1.73 M^2
ESTIMATED AVG GLUCOSE: 174 MG/DL (ref 68–131)
GLUCOSE SERPL-MCNC: 118 MG/DL (ref 70–110)
HBA1C MFR BLD: 7.7 % (ref 4–5.6)
HCT VFR BLD AUTO: 39.9 % (ref 40–54)
HDLC SERPL-MCNC: 39 MG/DL (ref 40–75)
HDLC SERPL: 25.2 % (ref 20–50)
HGB BLD-MCNC: 13.2 G/DL (ref 14–18)
IMM GRANULOCYTES # BLD AUTO: 0.03 K/UL (ref 0–0.04)
IMM GRANULOCYTES NFR BLD AUTO: 0.4 % (ref 0–0.5)
LDLC SERPL CALC-MCNC: 76 MG/DL (ref 63–159)
LYMPHOCYTES # BLD AUTO: 2.2 K/UL (ref 1–4.8)
LYMPHOCYTES NFR BLD: 25.9 % (ref 18–48)
MCH RBC QN AUTO: 29.5 PG (ref 27–31)
MCHC RBC AUTO-ENTMCNC: 33.1 G/DL (ref 32–36)
MCV RBC AUTO: 89 FL (ref 82–98)
MONOCYTES # BLD AUTO: 0.6 K/UL (ref 0.3–1)
MONOCYTES NFR BLD: 7.7 % (ref 4–15)
NEUTROPHILS # BLD AUTO: 5.2 K/UL (ref 1.8–7.7)
NEUTROPHILS NFR BLD: 62.2 % (ref 38–73)
NONHDLC SERPL-MCNC: 116 MG/DL
NRBC BLD-RTO: 0 /100 WBC
PLATELET # BLD AUTO: 237 K/UL (ref 150–450)
PMV BLD AUTO: 11.5 FL (ref 9.2–12.9)
POTASSIUM SERPL-SCNC: 4.1 MMOL/L (ref 3.5–5.1)
PROT SERPL-MCNC: 7.3 G/DL (ref 6–8.4)
RBC # BLD AUTO: 4.47 M/UL (ref 4.6–6.2)
SODIUM SERPL-SCNC: 136 MMOL/L (ref 136–145)
TRIGL SERPL-MCNC: 200 MG/DL (ref 30–150)
WBC # BLD AUTO: 8.29 K/UL (ref 3.9–12.7)

## 2021-09-21 PROCEDURE — 80053 COMPREHEN METABOLIC PANEL: CPT | Performed by: FAMILY MEDICINE

## 2021-09-21 PROCEDURE — 85025 COMPLETE CBC W/AUTO DIFF WBC: CPT | Performed by: FAMILY MEDICINE

## 2021-09-21 PROCEDURE — 80061 LIPID PANEL: CPT | Performed by: FAMILY MEDICINE

## 2021-09-21 PROCEDURE — 83036 HEMOGLOBIN GLYCOSYLATED A1C: CPT | Performed by: FAMILY MEDICINE

## 2021-09-21 PROCEDURE — 84153 ASSAY OF PSA TOTAL: CPT | Performed by: FAMILY MEDICINE

## 2021-09-21 PROCEDURE — 36415 COLL VENOUS BLD VENIPUNCTURE: CPT | Mod: PN | Performed by: FAMILY MEDICINE

## 2021-09-24 ENCOUNTER — TELEPHONE (OUTPATIENT)
Dept: FAMILY MEDICINE | Facility: CLINIC | Age: 61
End: 2021-09-24

## 2021-10-08 ENCOUNTER — PATIENT OUTREACH (OUTPATIENT)
Dept: ADMINISTRATIVE | Facility: HOSPITAL | Age: 61
End: 2021-10-08

## 2021-10-08 ENCOUNTER — OFFICE VISIT (OUTPATIENT)
Dept: FAMILY MEDICINE | Facility: CLINIC | Age: 61
End: 2021-10-08
Payer: MEDICAID

## 2021-10-08 VITALS
BODY MASS INDEX: 24.91 KG/M2 | HEIGHT: 66 IN | WEIGHT: 155 LBS | OXYGEN SATURATION: 99 % | TEMPERATURE: 98 F | HEART RATE: 74 BPM | DIASTOLIC BLOOD PRESSURE: 84 MMHG | SYSTOLIC BLOOD PRESSURE: 134 MMHG

## 2021-10-08 DIAGNOSIS — E11.69 TYPE 2 DIABETES MELLITUS WITH OTHER SPECIFIED COMPLICATION, WITHOUT LONG-TERM CURRENT USE OF INSULIN: ICD-10-CM

## 2021-10-08 DIAGNOSIS — M25.562 CHRONIC PAIN OF LEFT KNEE: Primary | ICD-10-CM

## 2021-10-08 DIAGNOSIS — I10 ESSENTIAL HYPERTENSION: ICD-10-CM

## 2021-10-08 DIAGNOSIS — E78.2 MIXED HYPERLIPIDEMIA: ICD-10-CM

## 2021-10-08 DIAGNOSIS — K51.919 ULCERATIVE COLITIS WITH COMPLICATION, UNSPECIFIED LOCATION: ICD-10-CM

## 2021-10-08 DIAGNOSIS — G89.29 CHRONIC PAIN OF LEFT KNEE: Primary | ICD-10-CM

## 2021-10-08 PROCEDURE — 99214 PR OFFICE/OUTPT VISIT, EST, LEVL IV, 30-39 MIN: ICD-10-PCS | Mod: S$PBB,,, | Performed by: FAMILY MEDICINE

## 2021-10-08 PROCEDURE — 99214 OFFICE O/P EST MOD 30 MIN: CPT | Mod: PBBFAC,PO | Performed by: FAMILY MEDICINE

## 2021-10-08 PROCEDURE — 99999 PR PBB SHADOW E&M-EST. PATIENT-LVL IV: CPT | Mod: PBBFAC,,, | Performed by: FAMILY MEDICINE

## 2021-10-08 PROCEDURE — 99214 OFFICE O/P EST MOD 30 MIN: CPT | Mod: S$PBB,,, | Performed by: FAMILY MEDICINE

## 2021-10-08 PROCEDURE — 99999 PR PBB SHADOW E&M-EST. PATIENT-LVL IV: ICD-10-PCS | Mod: PBBFAC,,, | Performed by: FAMILY MEDICINE

## 2021-10-29 ENCOUNTER — PATIENT OUTREACH (OUTPATIENT)
Dept: ADMINISTRATIVE | Facility: HOSPITAL | Age: 61
End: 2021-10-29
Payer: MEDICAID

## 2021-12-06 ENCOUNTER — TELEPHONE (OUTPATIENT)
Dept: SPORTS MEDICINE | Facility: CLINIC | Age: 61
End: 2021-12-06
Payer: MEDICAID

## 2021-12-06 ENCOUNTER — OFFICE VISIT (OUTPATIENT)
Dept: ORTHOPEDICS | Facility: CLINIC | Age: 61
End: 2021-12-06
Payer: MEDICAID

## 2021-12-06 VITALS
SYSTOLIC BLOOD PRESSURE: 120 MMHG | RESPIRATION RATE: 18 BRPM | WEIGHT: 156 LBS | HEART RATE: 74 BPM | BODY MASS INDEX: 25.18 KG/M2 | DIASTOLIC BLOOD PRESSURE: 68 MMHG

## 2021-12-06 DIAGNOSIS — M17.12 PRIMARY OSTEOARTHRITIS OF LEFT KNEE: ICD-10-CM

## 2021-12-06 DIAGNOSIS — M25.562 LEFT KNEE PAIN, UNSPECIFIED CHRONICITY: Primary | ICD-10-CM

## 2021-12-06 PROCEDURE — 99999 PR PBB SHADOW E&M-EST. PATIENT-LVL V: CPT | Mod: PBBFAC,,, | Performed by: PHYSICIAN ASSISTANT

## 2021-12-06 PROCEDURE — 3061F PR NEG MICROALBUMINURIA RESULT DOCUMENTED/REVIEW: ICD-10-PCS | Mod: CPTII,,, | Performed by: PHYSICIAN ASSISTANT

## 2021-12-06 PROCEDURE — 3066F PR DOCUMENTATION OF TREATMENT FOR NEPHROPATHY: ICD-10-PCS | Mod: CPTII,,, | Performed by: PHYSICIAN ASSISTANT

## 2021-12-06 PROCEDURE — 3066F NEPHROPATHY DOC TX: CPT | Mod: CPTII,,, | Performed by: PHYSICIAN ASSISTANT

## 2021-12-06 PROCEDURE — 4010F PR ACE/ARB THEARPY RXD/TAKEN: ICD-10-PCS | Mod: CPTII,,, | Performed by: PHYSICIAN ASSISTANT

## 2021-12-06 PROCEDURE — 99203 OFFICE O/P NEW LOW 30 MIN: CPT | Mod: S$PBB,,, | Performed by: PHYSICIAN ASSISTANT

## 2021-12-06 PROCEDURE — 99999 PR PBB SHADOW E&M-EST. PATIENT-LVL V: ICD-10-PCS | Mod: PBBFAC,,, | Performed by: PHYSICIAN ASSISTANT

## 2021-12-06 PROCEDURE — 3061F NEG MICROALBUMINURIA REV: CPT | Mod: CPTII,,, | Performed by: PHYSICIAN ASSISTANT

## 2021-12-06 PROCEDURE — 4010F ACE/ARB THERAPY RXD/TAKEN: CPT | Mod: CPTII,,, | Performed by: PHYSICIAN ASSISTANT

## 2021-12-06 PROCEDURE — 99203 PR OFFICE/OUTPT VISIT, NEW, LEVL III, 30-44 MIN: ICD-10-PCS | Mod: S$PBB,,, | Performed by: PHYSICIAN ASSISTANT

## 2021-12-06 PROCEDURE — 99215 OFFICE O/P EST HI 40 MIN: CPT | Mod: PBBFAC,PN | Performed by: PHYSICIAN ASSISTANT

## 2021-12-14 ENCOUNTER — TELEPHONE (OUTPATIENT)
Dept: ORTHOPEDICS | Facility: CLINIC | Age: 61
End: 2021-12-14
Payer: MEDICAID

## 2022-01-04 ENCOUNTER — PATIENT MESSAGE (OUTPATIENT)
Dept: FAMILY MEDICINE | Facility: CLINIC | Age: 62
End: 2022-01-04
Payer: MEDICAID

## 2022-01-05 ENCOUNTER — TELEPHONE (OUTPATIENT)
Dept: ORTHOPEDICS | Facility: CLINIC | Age: 62
End: 2022-01-05
Payer: MEDICAID

## 2022-01-05 NOTE — TELEPHONE ENCOUNTER
Yolanda Cabello LPN  P [facility] Staff  Hi,     The request for Euflexxa for this patient has been denied due to Euflexxa not being a covered benefit with the patient's insurance payor. The only hyaluronan product that is covered by the patient's insurance payor is Durolane (). If you have any questions regarding this denial, please reach out to [facility]. If you would like to try Durolane, a new referral needs to be input.     Yolanda GUERRERO LPN   Pre-Service Department   P:[phone] Ext:[phone]

## 2022-01-07 ENCOUNTER — LAB VISIT (OUTPATIENT)
Dept: LAB | Facility: HOSPITAL | Age: 62
End: 2022-01-07
Attending: FAMILY MEDICINE
Payer: MEDICAID

## 2022-01-07 DIAGNOSIS — G89.29 CHRONIC PAIN OF LEFT KNEE: ICD-10-CM

## 2022-01-07 DIAGNOSIS — I10 ESSENTIAL HYPERTENSION: ICD-10-CM

## 2022-01-07 DIAGNOSIS — M25.562 CHRONIC PAIN OF LEFT KNEE: ICD-10-CM

## 2022-01-07 DIAGNOSIS — E11.69 TYPE 2 DIABETES MELLITUS WITH OTHER SPECIFIED COMPLICATION, WITHOUT LONG-TERM CURRENT USE OF INSULIN: ICD-10-CM

## 2022-01-07 DIAGNOSIS — K51.919 ULCERATIVE COLITIS WITH COMPLICATION, UNSPECIFIED LOCATION: ICD-10-CM

## 2022-01-07 DIAGNOSIS — E78.2 MIXED HYPERLIPIDEMIA: ICD-10-CM

## 2022-01-07 LAB
ALBUMIN SERPL BCP-MCNC: 4.2 G/DL (ref 3.5–5.2)
ALP SERPL-CCNC: 71 U/L (ref 55–135)
ALT SERPL W/O P-5'-P-CCNC: 24 U/L (ref 10–44)
ANION GAP SERPL CALC-SCNC: 10 MMOL/L (ref 8–16)
AST SERPL-CCNC: 15 U/L (ref 10–40)
BILIRUB SERPL-MCNC: 0.7 MG/DL (ref 0.1–1)
BUN SERPL-MCNC: 13 MG/DL (ref 8–23)
CALCIUM SERPL-MCNC: 9.4 MG/DL (ref 8.7–10.5)
CHLORIDE SERPL-SCNC: 101 MMOL/L (ref 95–110)
CHOLEST SERPL-MCNC: 140 MG/DL (ref 120–199)
CHOLEST/HDLC SERPL: 3.2 {RATIO} (ref 2–5)
CO2 SERPL-SCNC: 26 MMOL/L (ref 23–29)
CREAT SERPL-MCNC: 0.7 MG/DL (ref 0.5–1.4)
EST. GFR  (AFRICAN AMERICAN): >60 ML/MIN/1.73 M^2
EST. GFR  (NON AFRICAN AMERICAN): >60 ML/MIN/1.73 M^2
ESTIMATED AVG GLUCOSE: 151 MG/DL (ref 68–131)
GLUCOSE SERPL-MCNC: 128 MG/DL (ref 70–110)
HBA1C MFR BLD: 6.9 % (ref 4–5.6)
HDLC SERPL-MCNC: 44 MG/DL (ref 40–75)
HDLC SERPL: 31.4 % (ref 20–50)
LDLC SERPL CALC-MCNC: 63.8 MG/DL (ref 63–159)
NONHDLC SERPL-MCNC: 96 MG/DL
POTASSIUM SERPL-SCNC: 4.1 MMOL/L (ref 3.5–5.1)
PROT SERPL-MCNC: 7.6 G/DL (ref 6–8.4)
SODIUM SERPL-SCNC: 137 MMOL/L (ref 136–145)
TRIGL SERPL-MCNC: 161 MG/DL (ref 30–150)

## 2022-01-07 PROCEDURE — 36415 COLL VENOUS BLD VENIPUNCTURE: CPT | Mod: PN | Performed by: FAMILY MEDICINE

## 2022-01-07 PROCEDURE — 83036 HEMOGLOBIN GLYCOSYLATED A1C: CPT | Performed by: FAMILY MEDICINE

## 2022-01-07 PROCEDURE — 80061 LIPID PANEL: CPT | Performed by: FAMILY MEDICINE

## 2022-01-07 PROCEDURE — 80053 COMPREHEN METABOLIC PANEL: CPT | Performed by: FAMILY MEDICINE

## 2022-01-07 PROCEDURE — 85025 COMPLETE CBC W/AUTO DIFF WBC: CPT | Performed by: FAMILY MEDICINE

## 2022-01-08 LAB
BASOPHILS # BLD AUTO: 0.07 K/UL (ref 0–0.2)
BASOPHILS NFR BLD: 0.7 % (ref 0–1.9)
DIFFERENTIAL METHOD: ABNORMAL
EOSINOPHIL # BLD AUTO: 0.4 K/UL (ref 0–0.5)
EOSINOPHIL NFR BLD: 4 % (ref 0–8)
ERYTHROCYTE [DISTWIDTH] IN BLOOD BY AUTOMATED COUNT: 12.4 % (ref 11.5–14.5)
HCT VFR BLD AUTO: 43.3 % (ref 40–54)
HGB BLD-MCNC: 13.7 G/DL (ref 14–18)
IMM GRANULOCYTES # BLD AUTO: 0.03 K/UL (ref 0–0.04)
IMM GRANULOCYTES NFR BLD AUTO: 0.3 % (ref 0–0.5)
LYMPHOCYTES # BLD AUTO: 2.5 K/UL (ref 1–4.8)
LYMPHOCYTES NFR BLD: 25.8 % (ref 18–48)
MCH RBC QN AUTO: 29.1 PG (ref 27–31)
MCHC RBC AUTO-ENTMCNC: 31.6 G/DL (ref 32–36)
MCV RBC AUTO: 92 FL (ref 82–98)
MONOCYTES # BLD AUTO: 0.7 K/UL (ref 0.3–1)
MONOCYTES NFR BLD: 7.2 % (ref 4–15)
NEUTROPHILS # BLD AUTO: 5.9 K/UL (ref 1.8–7.7)
NEUTROPHILS NFR BLD: 62 % (ref 38–73)
NRBC BLD-RTO: 0 /100 WBC
PLATELET # BLD AUTO: 218 K/UL (ref 150–450)
PMV BLD AUTO: 11.3 FL (ref 9.2–12.9)
RBC # BLD AUTO: 4.71 M/UL (ref 4.6–6.2)
WBC # BLD AUTO: 9.58 K/UL (ref 3.9–12.7)

## 2022-01-10 ENCOUNTER — TELEPHONE (OUTPATIENT)
Dept: ORTHOPEDICS | Facility: CLINIC | Age: 62
End: 2022-01-10
Payer: MEDICAID

## 2022-01-10 ENCOUNTER — PATIENT OUTREACH (OUTPATIENT)
Dept: ADMINISTRATIVE | Facility: OTHER | Age: 62
End: 2022-01-10
Payer: MEDICAID

## 2022-01-10 NOTE — TELEPHONE ENCOUNTER
I left pt a vm that we were still waiting to find out if we could give the other medication instead of Euflexxa, cancelled his appt

## 2022-01-10 NOTE — PROGRESS NOTES
Health Maintenance Due   Topic Date Due    Shingles Vaccine (1 of 2) Never done    Influenza Vaccine (1) 09/01/2021     Updates were requested from care everywhere.  Chart was reviewed for overdue Proactive [facility] Encounters (ERNESTINE) topics (CRS, Breast Cancer Screening, Eye exam)  Health Maintenance has been updated.  LINKS immunization registry triggered.  Immunizations were reconciled.

## 2022-01-12 ENCOUNTER — PATIENT MESSAGE (OUTPATIENT)
Dept: FAMILY MEDICINE | Facility: CLINIC | Age: 62
End: 2022-01-12
Payer: MEDICAID

## 2022-01-14 ENCOUNTER — TELEPHONE (OUTPATIENT)
Dept: ORTHOPEDICS | Facility: CLINIC | Age: 62
End: 2022-01-14
Payer: MEDICAID

## 2022-01-14 ENCOUNTER — OFFICE VISIT (OUTPATIENT)
Dept: FAMILY MEDICINE | Facility: CLINIC | Age: 62
End: 2022-01-14
Payer: MEDICAID

## 2022-01-14 DIAGNOSIS — K51.919 ULCERATIVE COLITIS WITH COMPLICATION, UNSPECIFIED LOCATION: ICD-10-CM

## 2022-01-14 DIAGNOSIS — M17.12 PRIMARY OSTEOARTHRITIS OF LEFT KNEE: Primary | ICD-10-CM

## 2022-01-14 DIAGNOSIS — I10 ESSENTIAL HYPERTENSION: ICD-10-CM

## 2022-01-14 DIAGNOSIS — E78.2 MIXED HYPERLIPIDEMIA: ICD-10-CM

## 2022-01-14 DIAGNOSIS — E11.69 TYPE 2 DIABETES MELLITUS WITH OTHER SPECIFIED COMPLICATION, WITHOUT LONG-TERM CURRENT USE OF INSULIN: Primary | ICD-10-CM

## 2022-01-14 PROCEDURE — 99214 PR OFFICE/OUTPT VISIT, EST, LEVL IV, 30-39 MIN: ICD-10-PCS | Mod: 95,,, | Performed by: FAMILY MEDICINE

## 2022-01-14 PROCEDURE — 1160F PR REVIEW ALL MEDS BY PRESCRIBER/CLIN PHARMACIST DOCUMENTED: ICD-10-PCS | Mod: CPTII,95,, | Performed by: FAMILY MEDICINE

## 2022-01-14 PROCEDURE — 1159F MED LIST DOCD IN RCRD: CPT | Mod: CPTII,95,, | Performed by: FAMILY MEDICINE

## 2022-01-14 PROCEDURE — 3044F PR MOST RECENT HEMOGLOBIN A1C LEVEL <7.0%: ICD-10-PCS | Mod: CPTII,95,, | Performed by: FAMILY MEDICINE

## 2022-01-14 PROCEDURE — 1159F PR MEDICATION LIST DOCUMENTED IN MEDICAL RECORD: ICD-10-PCS | Mod: CPTII,95,, | Performed by: FAMILY MEDICINE

## 2022-01-14 PROCEDURE — 3044F HG A1C LEVEL LT 7.0%: CPT | Mod: CPTII,95,, | Performed by: FAMILY MEDICINE

## 2022-01-14 PROCEDURE — 1160F RVW MEDS BY RX/DR IN RCRD: CPT | Mod: CPTII,95,, | Performed by: FAMILY MEDICINE

## 2022-01-14 PROCEDURE — 99214 OFFICE O/P EST MOD 30 MIN: CPT | Mod: 95,,, | Performed by: FAMILY MEDICINE

## 2022-01-14 NOTE — TELEPHONE ENCOUNTER
----- Message from Sydnie Reed PA-C sent at 1/14/2022  2:19 PM CST -----  He is scheduled to see me on Tuesday. We are still working on getting his injections approved. Please cancel his appt on Tuesday and keep the one for 1/25/22.     Thanks.

## 2022-01-14 NOTE — PROGRESS NOTES
The patient location is: home  The chief complaint leading to consultation is:  Medical f/u  Visit type: Virtual visit with synchronous audio and video  Total time spent with patient:  10 min  Each patient to whom he or she provides medical services by telemedicine is:  (1) informed of the relationship between the physician and patient and the respective role of any other health care provider with respect to management of the patient; and (2) notified that he or she may decline to receive medical services by telemedicine and may withdraw from such care at any time.    (Portions of this note were dictated using voice recognition software and may contain dictation related errors in spelling/grammar/syntax not found on text review)         HPI: 61 y.o. male       Diabetes, on metformin  2 pills am/2 pill pm.  glipizide 10 mg twice a day, Jardiance 25 mg daily.  Tried to switch him to weekly G LP 1 agonist with Trulicity but apparently this was not covered and recommendation was to try bydureon, Victoza, or Byetta 1st.  Tried later by to do Bydureon weekly but apparently had some local allergic reaction with injections.  Had advised on potential of Victoza although he did not seem interested in doing a daily injection.  More recently however his diabetes has been better control with an A1c of 6.9    Does not take aspirin because of ALLERGY.    eye exam :  Up-to-date (Dr. Rapp)  Sees a dentist regularly.    No neuropathy symptoms     CAD:  Presentation march 2017 with angina. CAD on angio, no revasc ability. Medically managed with statin (was on Lipitor 80 but given suboptimal LDL switched over to Crestor 40), plavix, metoprolol suc 25 daily, losartan 100 mg daily, imdur 30 mg daily.  Had a nuclear stress test done last in December 2, 2020. Normal perfusion scan.  EF normal at rest and with stress.  Last cardiology visit was on 11/05/2020     Hypertension on losartan 100 mg daily,metoprolol 50 mg daily,Amlodipine  5 mg daily .        Ulcerative colitis, prior followed by GI. Currently not taking mesalamine (asymptomatic at this time)    Omeprazole PRN for GERD sx    Has had some left knee pain, chronic in nature, no recent injury.  No swelling of the knee.  Does get stiff sometimes.  X-ray in June showed calcification along MCL suggesting ligamentous injury.  Medial compartment narrowing  left greater than right, moderate left-sided suprapatellar joint effusion and superior and inferior patellar articular surface spurring.  Had given topical diclofenac gel, had discussed potential physical therapy.  Had consulted with Orthopedics at last visit, had set up for viscosupplementation although Euflexxa was not covered, trying to see if other viscosupplementation options are available.      Answers for HPI/ROS submitted by the patient on 1/13/2022  activity change: No  unexpected weight change: No  neck pain: No  hearing loss: No  rhinorrhea: No  trouble swallowing: No  eye discharge: No  visual disturbance: No  chest tightness: No  wheezing: No  chest pain: No  palpitations: No  blood in stool: No  constipation: No  vomiting: No  diarrhea: No  polydipsia: No  polyuria: No  difficulty urinating: No  urgency: No  hematuria: No  joint swelling: Yes  arthralgias: Yes  headaches: No  weakness: No  confusion: No  dysphoric mood: No          Past Medical History:   Diagnosis Date    Coronary artery disease involving native coronary artery of native heart without angina pectoris 7/29/2017    Diabetes mellitus, type II     HTN (hypertension)     Mixed hyperlipidemia 10/24/2019    Ulcerative colitis        Past Surgical History:   Procedure Laterality Date    CATARACT EXTRACTION Bilateral 2013    COLONOSCOPY N/A 1/22/2016    Procedure: COLONOSCOPY;  Surgeon: Aristeo Lynn Jr., MD;  Location: [facility];  Service: Endoscopy;  Laterality: N/A;    COLONOSCOPY N/A 10/13/2020    Procedure: COLONOSCOPY;  Surgeon: Charly Travis,  MD;  [facility];  Service: Endoscopy;  Laterality: N/A;    COLONOSCOPY N/A 5/3/2021    Procedure: COLONOSCOPY;  Surgeon: Charly Travis MD;  Location: Health system [facility];  Service: Endoscopy;  Laterality: N/A;       Family History   Problem Relation Age of Onset    Coronary artery disease Mother 75    Coronary artery disease Father 55    Coronary artery disease Brother 59    Diabetes Brother     Prostate cancer Neg Hx     Colon cancer Neg Hx        Social History     Tobacco Use    Smoking status: Former Smoker     Quit date: 3/16/2008     Years since quittin.8    Smokeless tobacco: Former User     Quit date: 2009   Substance Use Topics    Alcohol use: Not Currently     Alcohol/week: 2.0 standard drinks     Types: 1 Shots of liquor, 1 Glasses of wine per week     Comment: occasionally    Drug use: No       Lab Results   Component Value Date    WBC 9.58 2022    HGB 13.7 (L) 2022    HCT 43.3 2022    MCV 92 2022     2022    CHOL 140 2022    TRIG 161 (H) 2022    HDL 44 2022    ALT 24 2022    AST 15 2022    BILITOT 0.7 2022    ALKPHOS 71 2022     2022    K 4.1 2022     2022    CREATININE 0.7 2022    ESTGFRAFRICA >60.0 2022    EGFRNONAA >60.0 2022    CALCIUM 9.4 2022    ALBUMIN 4.2 2022    BUN 13 2022    CO2 26 2022    TSH 2.519 2021    PSA 0.67 2021    INR 1.1 2017    HGBA1C 6.9 (H) 2022    MICALBCREAT Unable to calculate 2021    LDLCALC 63.8 2022     (H) 2022       Hemoglobin A1C (%)   Date Value   2022 6.9 (H)   2021 7.7 (H)   2021 7.6 (H)   2020 8.9 (H)   2020 8.4 (H)   2019 8.1 (H)   2018 8.4 (H)   2017 7.7 (H)   2017 9.3 (H)   2017 9.5 (H)              PE (elements able to be captured on virtual encounter)  APPEARANCE: Well nourished,  well developed, in no acute distress.    HEAD: Normocephalic, atraumatic.  EYES:  .   Conjunctivae noninjected.  RESPIRATORY:  No increased work of breathing or objective visual signs of dyspnea  PSYCHIATRIC:  Normal mood and affect, alert and oriented, appropriate answers to questions      IMPRESSION  Encounter Diagnoses   Name Primary?    Type 2 diabetes mellitus with other specified complication, without long-term current use of insulin Yes    Essential hypertension     Mixed hyperlipidemia     Ulcerative colitis with complication, unspecified location            PLAN  Diabetes well controlled. Continue current meds. Recheck 3 mo    HLD: controlled.     CAD stable, no issues    HTN controlled at home.     UC: no symptoms, no meds, c/scope utd below    F/u 3 mo    HEALTH SCREENINGS   Immunizations:  Pneumovax 2015  Tetanus around 2013   flu:  Can get at pharmacy   zoster:  Can get at pharmacy  COVID vaccine (J/J) 03/08/2021.  Pfizer booster on 12/07/2021     Age/Gender Appropriate screenings:  Colonoscopy in 2021 diffuse mildly altered vascular mucosa distal descending colon and rectum and sigmoid colon, 5 mm sigmoid polyp, repeat colonoscopy 2 years  Prostate screening done  9/2021

## 2022-01-14 NOTE — TELEPHONE ENCOUNTER
Spoke with patient. Appointment cancelled with Sydnie for 18th. Patient aware that we are awaiting on the approval for the injection.

## 2022-01-19 NOTE — TELEPHONE ENCOUNTER
No new care gaps identified.  Powered by [employer]. Reference number: [id number].   1/19/2022 1:33:54 PM CST

## 2022-01-24 ENCOUNTER — TELEPHONE (OUTPATIENT)
Dept: ORTHOPEDICS | Facility: CLINIC | Age: 62
End: 2022-01-24
Payer: MEDICAID

## 2022-01-24 NOTE — TELEPHONE ENCOUNTER
Spoke with patient. Informed that injection was approved, but we are waiting for the medication to come in. Informed patient hat we will contact him as soon as we receive it. Patient verbalized understanding.

## 2022-01-24 NOTE — TELEPHONE ENCOUNTER
----- Message from Sydnie Reed PA-C sent at 1/24/2022 12:55 PM CST -----  He has appt tomorrow for durolane injection. It has been approved but we are waiting on the medication. Please cancel his appt as it's not in yet. Let him know we will call him as soon as the medication comes in and get him scheduled. Tell him I'm sorry for the delay.     Thanks.   ----- Message -----  From: Sydnie Reed PA-C  Sent: 1/24/2022  12:00 AM CST  To: Sydnie Reed PA-C      ----- Message -----  From: Sydnie Reed PA-C  Sent: 1/21/2022  12:00 AM CST  To: Sydnie Reed PA-C    Appt on 1/25/22. Do we have durolane and is it approved?     ----- Message -----  From: Sydnie Reed PA-C  Sent: 1/14/2022  12:00 AM CST  To: Sydnie Reed PA-C    Can we get durolane injections? Appt on Tuesday 1/18.

## 2022-01-25 NOTE — PROGRESS NOTES
OFFICE VISIT FOR LEFT KNEE DUROLANE INJECTION:    (Fred)      Michoacano Lind presents to clinic today for a DUROLANE injection into the left knee.      Exam demonstrates no effusion in the  left knee, and the skin is intact. No evidence of infection noted. Calf is supple and non tender.     Diagnosis: Osteoarthritis LEFT knee    LOT NUMBER:[id number]  EXPIRATION DATE: 7/31/24    Prior to procedure the correct patient, procedure, and site was verified and verbal consent obtained.     After a sterile prep of the skin using chloraprep one step, the area was sprayed with local topical anesthetic and then cleaned with alcohol. The LEFT knee was injected through an inferior lateral approach with 3 ml of Durolane.  Sterile dressing was applied.  The patient was instructed to rest apply ice and use OTC analgesics as needed. Patient should resume activities as tolerated and to call with any problems.     We will see Michoacano Lind back in 3-6 months for recheck.

## 2022-01-26 ENCOUNTER — OFFICE VISIT (OUTPATIENT)
Dept: ORTHOPEDICS | Facility: CLINIC | Age: 62
End: 2022-01-26
Payer: MEDICAID

## 2022-01-26 VITALS
OXYGEN SATURATION: 98 % | HEIGHT: 66 IN | HEART RATE: 76 BPM | BODY MASS INDEX: 25.07 KG/M2 | WEIGHT: 156 LBS | SYSTOLIC BLOOD PRESSURE: 132 MMHG | DIASTOLIC BLOOD PRESSURE: 68 MMHG

## 2022-01-26 DIAGNOSIS — M17.12 PRIMARY OSTEOARTHRITIS OF LEFT KNEE: Primary | ICD-10-CM

## 2022-01-26 PROCEDURE — 99499 UNLISTED E&M SERVICE: CPT | Mod: S$PBB,,, | Performed by: PHYSICIAN ASSISTANT

## 2022-01-26 PROCEDURE — 99999 PR PBB SHADOW E&M-EST. PATIENT-LVL III: CPT | Mod: PBBFAC,,, | Performed by: PHYSICIAN ASSISTANT

## 2022-01-26 PROCEDURE — 1160F PR REVIEW ALL MEDS BY PRESCRIBER/CLIN PHARMACIST DOCUMENTED: ICD-10-PCS | Mod: CPTII,,, | Performed by: PHYSICIAN ASSISTANT

## 2022-01-26 PROCEDURE — 99213 OFFICE O/P EST LOW 20 MIN: CPT | Mod: PBBFAC,PN,25 | Performed by: PHYSICIAN ASSISTANT

## 2022-01-26 PROCEDURE — 3078F PR MOST RECENT DIASTOLIC BLOOD PRESSURE < 80 MM HG: ICD-10-PCS | Mod: CPTII,,, | Performed by: PHYSICIAN ASSISTANT

## 2022-01-26 PROCEDURE — 99999 PR PBB SHADOW E&M-EST. PATIENT-LVL III: ICD-10-PCS | Mod: PBBFAC,,, | Performed by: PHYSICIAN ASSISTANT

## 2022-01-26 PROCEDURE — 3078F DIAST BP <80 MM HG: CPT | Mod: CPTII,,, | Performed by: PHYSICIAN ASSISTANT

## 2022-01-26 PROCEDURE — 20610 PR DRAIN/INJECT LARGE JOINT/BURSA: ICD-10-PCS | Mod: S$PBB,LT,, | Performed by: PHYSICIAN ASSISTANT

## 2022-01-26 PROCEDURE — 3075F SYST BP GE 130 - 139MM HG: CPT | Mod: CPTII,,, | Performed by: PHYSICIAN ASSISTANT

## 2022-01-26 PROCEDURE — 20610 DRAIN/INJ JOINT/BURSA W/O US: CPT | Mod: PBBFAC,PN | Performed by: PHYSICIAN ASSISTANT

## 2022-01-26 PROCEDURE — 3008F PR BODY MASS INDEX (BMI) DOCUMENTED: ICD-10-PCS | Mod: CPTII,,, | Performed by: PHYSICIAN ASSISTANT

## 2022-01-26 PROCEDURE — 3044F HG A1C LEVEL LT 7.0%: CPT | Mod: CPTII,,, | Performed by: PHYSICIAN ASSISTANT

## 2022-01-26 PROCEDURE — 3008F BODY MASS INDEX DOCD: CPT | Mod: CPTII,,, | Performed by: PHYSICIAN ASSISTANT

## 2022-01-26 PROCEDURE — 20610 DRAIN/INJ JOINT/BURSA W/O US: CPT | Mod: S$PBB,LT,, | Performed by: PHYSICIAN ASSISTANT

## 2022-01-26 PROCEDURE — 3075F PR MOST RECENT SYSTOLIC BLOOD PRESS GE 130-139MM HG: ICD-10-PCS | Mod: CPTII,,, | Performed by: PHYSICIAN ASSISTANT

## 2022-01-26 PROCEDURE — 1160F RVW MEDS BY RX/DR IN RCRD: CPT | Mod: CPTII,,, | Performed by: PHYSICIAN ASSISTANT

## 2022-01-26 PROCEDURE — 3044F PR MOST RECENT HEMOGLOBIN A1C LEVEL <7.0%: ICD-10-PCS | Mod: CPTII,,, | Performed by: PHYSICIAN ASSISTANT

## 2022-01-26 PROCEDURE — 1159F PR MEDICATION LIST DOCUMENTED IN MEDICAL RECORD: ICD-10-PCS | Mod: CPTII,,, | Performed by: PHYSICIAN ASSISTANT

## 2022-01-26 PROCEDURE — 1159F MED LIST DOCD IN RCRD: CPT | Mod: CPTII,,, | Performed by: PHYSICIAN ASSISTANT

## 2022-01-26 PROCEDURE — 99499 NO LOS: ICD-10-PCS | Mod: S$PBB,,, | Performed by: PHYSICIAN ASSISTANT

## 2022-01-26 RX ADMIN — Medication 60 MG: at 01:01

## 2022-01-28 ENCOUNTER — PATIENT MESSAGE (OUTPATIENT)
Dept: ORTHOPEDICS | Facility: CLINIC | Age: 62
End: 2022-01-28
Payer: MEDICAID

## 2022-02-01 ENCOUNTER — PATIENT MESSAGE (OUTPATIENT)
Dept: ORTHOPEDICS | Facility: CLINIC | Age: 62
End: 2022-02-01
Payer: MEDICAID

## 2022-02-01 NOTE — TELEPHONE ENCOUNTER
Please call and see if wants to come in to see me. If so, can put him in this week.     If not, he should continue to get better. Do ice and elevation. Tylenol as needed and knee brace as needed.

## 2022-02-04 NOTE — TELEPHONE ENCOUNTER
Refill Authorization Note   Michoacano Lind  is requesting a refill authorization.  Brief Assessment and Rationale for Refill:  Approve     Medication Therapy Plan:       Medication Reconciliation Completed: No   Comments:   --->Care Gap information included below if applicable.       Requested Prescriptions   Pending Prescriptions Disp Refills    JARDIANCE 25 mg tablet [Pharmacy Med Name: JARDIANCE 25MG TABS] 90 tablet 1     Sig: TAKE ONE TABLET (25MG) BY MOUTH ONCE DAILY       Endocrinology:  Diabetes - SGLT2 Inhibitors Passed - 2/4/2022  4:18 PM        Passed - Patient is at least 18 years old        Passed - BP > 90/50 mmH     BP Readings from Last 1 Encounters:   01/26/22 132/68               Passed - Valid encounter within last 15 months     Recent Visits  Date Type Provider Dept   01/14/22 Office Visit Facundo Alexis MD [facility]   10/08/21 Office Visit Facundo Alexis MD [facility]   06/04/21 Office Visit Facundo Alexis MD [facility]   09/01/20 Office Visit Facundo Alexis MD [facility]   05/15/20 Office Visit Facundo Alexis MD [facility]   Showing recent visits within past 720 days and meeting all other requirements  Future Appointments  No visits were found meeting these conditions.  Showing future appointments within next 150 days and meeting all other requirements      Future Appointments              In 2 months [facility] [place] [facility], [facility]    In 2 months Facundo Alexis MD [facility], [facility]    In 2 months Sydnie Reed PA-C [facility], [place]                Passed - Cr is 1.39 or below and within 360 days     Lab Results   Component Value Date    CREATININE 0.7 01/07/2022    CREATININE 0.7 09/21/2021    CREATININE 0.8 06/09/2021              Passed - HBA1C within 180 days     Lab Results   Component Value Date    HGBA1C 6.9 (H) 01/07/2022    HGBA1C  7.7 (H) 09/21/2021    HGBA1C 7.6 (H) 06/09/2021              Passed - eGFR is 45 or above and within 360 days     Lab Results   Component Value Date    EGFRNONAA >60.0 01/07/2022    EGFRNONAA >60.0 09/21/2021    EGFRNONAA >60.0 06/09/2021                    Appointments  past 12m or future 3m with PCP    Date Provider   Last Visit   1/14/2022 Facundo Alexis MD   Next Visit   4/22/2022 Facundo Alexis MD   ED visits in past 90 days: 0     Note composed:5:00 PM 02/04/2022

## 2022-02-07 RX ORDER — EMPAGLIFLOZIN 25 MG/1
TABLET, FILM COATED ORAL
Qty: 90 TABLET | Refills: 1 | Status: SHIPPED | OUTPATIENT
Start: 2022-02-07 | End: 2022-11-03

## 2022-04-04 ENCOUNTER — HOSPITAL ENCOUNTER (EMERGENCY)
Facility: HOSPITAL | Age: 62
Discharge: HOME | End: 2022-04-04
Attending: EMERGENCY MEDICINE
Payer: MEDICAID

## 2022-04-04 VITALS
TEMPERATURE: 98 F | OXYGEN SATURATION: 99 % | HEIGHT: 66 IN | HEART RATE: 66 BPM | RESPIRATION RATE: 18 BRPM | DIASTOLIC BLOOD PRESSURE: 72 MMHG | BODY MASS INDEX: 25.07 KG/M2 | WEIGHT: 156 LBS | SYSTOLIC BLOOD PRESSURE: 131 MMHG
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
## 2022-04-04 DIAGNOSIS — T14.8XXA FRACTURE: ICD-10-CM

## 2022-04-04 DIAGNOSIS — S52.501A CLOSED FRACTURE OF DISTAL END OF RIGHT RADIUS, UNSPECIFIED FRACTURE MORPHOLOGY, INITIAL ENCOUNTER: Primary | ICD-10-CM

## 2022-04-04 PROCEDURE — 99284 EMERGENCY DEPT VISIT MOD MDM: CPT | Mod: 25

## 2022-04-04 PROCEDURE — 25000003 PHARM REV CODE 250: Performed by: PHYSICIAN ASSISTANT

## 2022-04-04 PROCEDURE — 25605 CLTX DST RDL FX/EPHYS SEP W/: CPT

## 2022-04-04 RX ORDER — LIDOCAINE HYDROCHLORIDE 20 MG/ML
5 INJECTION, SOLUTION EPIDURAL; INFILTRATION; INTRACAUDAL; PERINEURAL
Status: COMPLETED | OUTPATIENT
Start: 2022-04-04 | End: 2022-04-04

## 2022-04-04 RX ORDER — ONDANSETRON 4 MG/1
4 TABLET, ORALLY DISINTEGRATING ORAL EVERY 8 HOURS PRN
Qty: 12 TABLET | Refills: 0 | Status: SHIPPED | OUTPATIENT
Start: 2022-04-04 | End: 2023-01-27

## 2022-04-04 RX ORDER — HYDROCODONE BITARTRATE AND ACETAMINOPHEN 5; 325 MG/1; MG/1
1 TABLET ORAL EVERY 6 HOURS PRN
Qty: 12 TABLET | Refills: 0 | Status: SHIPPED | OUTPATIENT
Start: 2022-04-04 | End: 2022-04-07

## 2022-04-04 RX ADMIN — LIDOCAINE HYDROCHLORIDE 100 MG: 20 INJECTION, SOLUTION EPIDURAL; INFILTRATION; INTRACAUDAL; PERINEURAL at 02:04

## 2022-04-04 NOTE — ED NOTES
Pt AAOx4, Abc's intact. NADN. No adverse reaction to medication given. Pt walked to Registration for Check-Out. D/C instructions and Rx in pt possession along with belongings. No other needs at this time.

## 2022-04-04 NOTE — FIRST PROVIDER EVALUATION
Emergency Department TeleTriage Encounter Note      CHIEF COMPLAINT    Chief Complaint   Patient presents with    Fall    Wrist Injury     Rt.        VITAL SIGNS   Initial Vitals [04/04/22 1232]   BP Pulse Resp Temp SpO2   (!) 168/74 76 20 98.3 °F (36.8 °C) 97 %      MAP       --            ALLERGIES    Review of patient's allergies indicates:   Allergen Reactions    Aspirin     Lisinopril Other (See Comments)     Cough 2/2 aceI    Aspirin      Other^bloody diarrhea    Bydureon [exenatide microspheres]      Rash, loss of appetite.        PROVIDER TRIAGE NOTE  This is a teletriage evaluation of a 61 y.o. male presenting to the ED complaining of wrist pain. Patient reports falling approximately 2 feet off of ladder. He landed on right hand. He denies head injury, neck pain,or back pain. He has decreased ROM of wrist. Patient complaining of hand and wrist pain.     Initial orders will be placed and care will be transferred to an alternate provider when patient is roomed for a full evaluation. Any additional orders and the final disposition will be determined by that provider.           ORDERS  Labs Reviewed - No data to display    ED Orders (720h ago, onward)    Start Ordered     Status Ordering Provider    04/04/22 1241 04/04/22 1240  Apply ice to affected area  Once         Ordered HYUN YUAN G.    04/04/22 1240 04/04/22 1239  X-Ray Hand 3 View Right  1 time imaging         Ordered HYUN YUAN G.    04/04/22 1240 04/04/22 1239  X-Ray Wrist Complete Right  1 time imaging         Ordered HYUN YUAN G.            Virtual Visit Note: The provider triage portion of this emergency department evaluation and documentation was performed via [employer], a HIPAA-compliant telemedicine application, in concert with a tele-presenter in the room. A face to face patient evaluation with one of my colleagues will occur once the patient is placed in an emergency department room.      DISCLAIMER: This note was prepared  with [employer] voice recognition transcription software. Garbled syntax, mangled pronouns, and other bizarre constructions may be attributed to that software system.

## 2022-04-04 NOTE — ED PROVIDER NOTES
Encounter Date: 4/4/2022    SCRIBE #1 NOTE: IDorothy, chester scribing for, and in the presence of, Judith Barragan PA-C.       History     Chief Complaint   Patient presents with    Fall    Wrist Injury     Rt.      Time seen by provider: 1:48 PM on 04/04/2022    Michoacano Lind is a 61 y.o. male with a PMHx of HTN, DM-2, and CAD who presents to the ED with an onset of right wrist pain and swelling after getting tripped up and falling down 2 steps onto his outstretched hand shortly PTA. Patient is on Plavix. He also reports abrasion to the knee. The patient denies hitting his head, LOC or any other symptoms at this time.    The history is provided by the patient.     Review of patient's allergies indicates:   Allergen Reactions    Aspirin     Lisinopril Other (See Comments)     Cough 2/2 aceI    Aspirin      Other^bloody diarrhea    Bydureon [exenatide microspheres]      Rash, loss of appetite.      Past Medical History:   Diagnosis Date    Coronary artery disease involving native coronary artery of native heart without angina pectoris 7/29/2017    Diabetes mellitus, type II     HTN (hypertension)     Mixed hyperlipidemia 10/24/2019    Ulcerative colitis      Past Surgical History:   Procedure Laterality Date    CATARACT EXTRACTION Bilateral 2013    COLONOSCOPY N/A 1/22/2016    Procedure: COLONOSCOPY;  Surgeon: Aristeo Lynn Jr., MD;  Location: [facility];  Service: Endoscopy;  Laterality: N/A;    COLONOSCOPY N/A 10/13/2020    Procedure: COLONOSCOPY;  Surgeon: Charly Travis MD;  Location: [facility];  Service: Endoscopy;  Laterality: N/A;    COLONOSCOPY N/A 5/3/2021    Procedure: COLONOSCOPY;  Surgeon: Charly Travis MD;  Location: [facility];  Service: Endoscopy;  Laterality: N/A;     Family History   Problem Relation Age of Onset    Coronary artery disease Mother 75    Coronary artery disease Father 55    Coronary artery disease Brother 59    Diabetes Brother     Prostate  cancer Neg Hx     Colon cancer Neg Hx      Social History     Tobacco Use    Smoking status: Former Smoker     Quit date: 3/16/2008     Years since quittin.0    Smokeless tobacco: Former User     Quit date: 2009   Substance Use Topics    Alcohol use: Not Currently     Alcohol/week: 2.0 standard drinks     Types: 1 Shots of liquor, 1 Glasses of wine per week     Comment: occasionally    Drug use: No     Review of Systems   Constitutional: Negative for fever.   Respiratory: Negative for shortness of breath.    Genitourinary: Negative for flank pain.   Musculoskeletal: Positive for arthralgias and joint swelling. Negative for gait problem.   Skin: Negative for color change and wound.   Neurological: Negative for weakness.        - LOC   Hematological: Bruises/bleeds easily.   Psychiatric/Behavioral: Negative for confusion.       Physical Exam     Initial Vitals [22 1232]   BP Pulse Resp Temp SpO2   (!) 168/74 76 20 98.3 °F (36.8 °C) 97 %      MAP       --         Physical Exam    Nursing note and vitals reviewed.  Constitutional: He appears well-developed and well-nourished. He is not diaphoretic. No distress.   Cardiovascular: Intact distal pulses.   Musculoskeletal:         General: Tenderness present.      Right wrist: Deformity, tenderness and bony tenderness present.      Comments: Deformity to right wrist with associated tenderness and pain with full ROM of the wrist. 2+ radial pulse. Sensation intact. No snuff box tenderness.      Neurological: He is alert and oriented to person, place, and time. He has normal strength. No sensory deficit.   Skin: Skin is warm and dry. No rash and no abscess noted. No erythema.   Psychiatric: He has a normal mood and affect.         ED Course   Orthopedic Injury    Date/Time: 2022 1:55 PM  Performed by: Aristeo Ortiz MD  Authorized by: Aristeo Ortiz MD     Location procedure was performed:  [facility] EMERGENCY DEPARTMENT  Consent Done?:  Yes  [place]  Protocol:     Verbal consent obtained?: Yes      Risks and benefits: Risks, benefits and alternatives were discussed      Consent given by:  Patient  Injury:     Injury location:  Wrist    Location details:  Right wrist    Injury type:  Fracture    Fracture type: distal radius      Fracture type: distal radius        Pre-procedure assessment:     Neurovascular status: Neurovascularly intact      Distal perfusion: normal      Neurological function: normal      Range of motion: reduced      Local anesthesia used?: Yes      Anesthesia:  Hematoma block    Local anesthetic:  Lidocaine 2% without epinephrine    Patient sedated?: No        Selections made in this section will also lock the Injury type section above.:     Manipulation performed?: Yes      Skin traction used?: Yes      Skeletal traction used?: Yes      Reduction successful?: Yes      Confirmation: Reduction confirmed by x-ray      Splint type:  Sugar tong    Supplies used:  Ortho-Glass and cotton padding    Complications: No    Post-procedure assessment:     Neurovascular status: Neurovascularly intact      Distal perfusion: normal      Neurological function: normal      Range of motion: splinted      Patient tolerance:  Patient tolerated the procedure well with no immediate complications      Labs Reviewed - No data to display       Imaging Results          X-Ray Wrist Complete Right (Final result)  Result time 04/04/22 15:28:04    Final result by Delbert Crowley MD (04/04/22 15:28:04)                 Impression:      Right distal radius fracture with improved alignment      Electronically signed by: Delbert Crowley MD  Date:    04/04/2022  Time:    15:28             Narrative:    EXAMINATION:  XR WRIST COMPLETE 3 VIEWS RIGHT    CLINICAL HISTORY:  Other injury of unspecified body region, initial encounter    TECHNIQUE:  PA, lateral, and oblique views of the right wrist were performed.    COMPARISON:  04/04/2022    FINDINGS:  There is a comminuted  intra-articular fracture of the right distal radius, showing improved alignment.  There is diffuse soft tissue swelling about the wrist.                               X-Ray Hand 3 View Right (Final result)  Result time 04/04/22 13:06:32    Final result by Delbert Crowley MD (04/04/22 13:06:32)                 Impression:      Acute right distal radius fracture.      Electronically signed by: Delbert Crowley MD  Date:    04/04/2022  Time:    13:06             Narrative:    EXAMINATION:  XR HAND COMPLETE 3 VIEW RIGHT    CLINICAL HISTORY:  fall;    TECHNIQUE:  PA, lateral, and oblique views of the right hand were performed.    COMPARISON:  None    FINDINGS:  There is a comminuted, displaced, intra-articular fracture of the right distal radius.  The bones of the right hand are intact and located.  Mild scattered degenerative changes are present.                               X-Ray Wrist Complete Right (Final result)  Result time 04/04/22 13:06:06    Final result by Delbert Crowley MD (04/04/22 13:06:06)                 Impression:      Comminuted, displaced, intra-articular fracture of the right distal radius.      Electronically signed by: Delbert Crowley MD  Date:    04/04/2022  Time:    13:06             Narrative:    EXAMINATION:  XR WRIST COMPLETE 3 VIEWS RIGHT    CLINICAL HISTORY:  Unspecified fall, initial encounter    TECHNIQUE:  PA, lateral, and oblique views of the right wrist were performed.    COMPARISON:  None    FINDINGS:  There is a comminuted fracture of the right distal radius with extension to the articular surface.  There is moderate displacement of a dorsal fracture fragment.  The distal ulna is intact.                                 Medications   LIDOcaine (PF) 20 mg/mL (2%) injection 100 mg (100 mg Infiltration Given by Provider 4/4/22 3210)     Medical Decision Making:   History:   Old Medical Records: I decided to obtain old medical records.  Clinical Tests:   Radiological Study:  Ordered and Reviewed       APC / Resident Notes:   Urgent evaluation of a 61-year-old male who presents with right wrist pain after a fall.  He reports falling on outstretched hand.  He denies further injury.  He denies hitting his head.  He is alert and oriented.  He has an obvious deformity to the right wrist.  He is neurovascularly intact with decreased range of motion secondary to pain.  X-ray confirms distal radius fracture.  Reduction performed by Dr. Ortiz.  Repeat x-ray shows improvement.  Patient was placed in a splint instructed to follow up closely with Orthopedics. Discussed results with patient. Return precautions given. Based on my clinical evaluation, I do not appreciate any immediate, emergent, or life threatening condition or etiology that warrants additional workup today and feel that the patient can be discharged with close follow up care.  Patient is to follow up with their primary care provider. Case was discussed with Dr. Ortiz who has evaluated the patient and is in agreement with the plan of care. All questions answered.        Scribe Attestation:   Scribe #1: I performed the above scribed service and the documentation accurately describes the services I performed. I attest to the accuracy of the note.               I, Judith Barragan PA-C, personally performed the services described in this documentation. All medical record entries made by the scribe were at my direction and in my presence.  I have reviewed the chart and agree that the record reflects my personal performance and is accurate and complete. Judith Barragan PA-C.  5:50 PM 04/04/2022    Clinical Impression:   Final diagnoses:  [W19.XXXA] Fall  [T14.8XXA] Fracture  [S52.501A] Closed fracture of distal end of right radius, unspecified fracture morphology, initial encounter (Primary)          ED Disposition Condition    Discharge Stable        ED Prescriptions     Medication Sig Dispense Start Date End Date Auth. Provider     HYDROcodone-acetaminophen (NORCO) 5-325 mg per tablet Take 1 tablet by mouth every 6 (six) hours as needed for Pain. 12 tablet 4/4/2022 4/7/2022 Aristeo Ortiz MD    ondansetron (ZOFRAN-ODT) 4 MG TbDL Take 1 tablet (4 mg total) by mouth every 8 (eight) hours as needed. 12 tablet 4/4/2022  Aristeo Ortiz MD        Follow-up Information     Follow up With Specialties Details Why Contact Info    Harvey Griggs MD Orthopedic Surgery Schedule an appointment as soon as possible for a visit  Call the office tomorrow morning to schedule follow-up appointment.  We have also sent a referral in to [facility] in [place] [address] [place] [address]  [address]  [facility]  [facility] [address]  [phone]             Judith Barragan PA-C  04/04/22 1640

## 2022-04-05 ENCOUNTER — TELEPHONE (OUTPATIENT)
Dept: ORTHOPEDICS | Facility: CLINIC | Age: 62
End: 2022-04-05
Payer: MEDICAID

## 2022-04-05 NOTE — TELEPHONE ENCOUNTER
I called pt and he states he was able to get in with [facility] this week and no longer needs to get in to see ADELINA Reed immediately.

## 2022-04-05 NOTE — TELEPHONE ENCOUNTER
----- Message from Irma Bishop sent at 4/5/2022  8:39 AM CDT -----  Contact: [phone]  Type:  Sooner Apoointment Request    Caller is requesting a sooner appointment.  Caller declined first available appointment listed below.  Caller will not accept being placed on the waitlist and is requesting a message be sent to doctor.  Name of Caller:pt called  When is the first available appointment?04/13/2022  Symptoms:R wrist fracture  Would the patient rather a call back or a response via [employer]? #call back   Best Call Back Number:#[phone]  Additional Information:

## 2022-05-04 NOTE — TELEPHONE ENCOUNTER
Refill Routing Note   Medication(s) are not appropriate for processing by [facility] for the following reason(s):      - Patient has been seen in the ED/Hospital since the last PCP visit    [facility] action(s):  Defer          Medication reconciliation completed: No     Appointments  past 12m or future 3m with PCP    Date Provider   Last Visit   1/14/2022 Facundo Alexis MD   Next Visit   7/22/2022 Facundo Alexis MD   ED visits in past 90 days: 1        Note composed:1:20 PM 05/04/2022

## 2022-05-04 NOTE — TELEPHONE ENCOUNTER
Care Due:                  Date            Visit Type   Department     Provider  --------------------------------------------------------------------------------                                ESTABLISHED                              PATIENT -    [facility] FAMILY  Last Visit: 01-      VIRTUAL      [facility] -                              PRIMARY      [facility]  Next Visit: 07-      CARE ([facility])   MEDICINE       Facundo S  Vanesa                                                            Last  Test          Frequency    Reason                     Performed    Due Date  --------------------------------------------------------------------------------    HBA1C.......  6 months...  JARDIANCE, glipiZIDE,      01- 07-                             metFORMIN................    [facility] Embedded Care Gaps. Reference number: [id number]. 5/04/2022   12:58:01 PM CDT

## 2022-06-17 NOTE — PROGRESS NOTES
Subjective:      Patient ID: Michoacano DEMI Lind is a 62 y.o. male.    Chief Complaint: No chief complaint on file.      HPI  (Fred)    Last seen by me on 1/26/22 for DUROLANE injection into left knee. Known mild medial joint space narrowing right knee with moderate/severe medial joint space narrowing left knee with osteophytes.    He was also given knee brace previously.     He is here for follow up.     He is doing well with minimal left knee pain. He had increased pain for about a week after his left knee injection and then it improved. No locking, catching, or giving way. He notes decreased mobility of the knee- he cannot bend it as far back as right.     He had fall since seeing me last with right wrist fracture the required surgery ([facility]). He is still in OT.     He uses prn voltaren gel. He is on PLAVIX.       Past Medical History:   Diagnosis Date    Coronary artery disease involving native coronary artery of native heart without angina pectoris 7/29/2017    Diabetes mellitus, type II     HTN (hypertension)     Mixed hyperlipidemia 10/24/2019    Ulcerative colitis          Current Outpatient Medications:     amLODIPine (NORVASC) 5 MG tablet, Take 1 tablet (5 mg total) by mouth once daily., Disp: 30 tablet, Rfl: 11    clopidogreL (PLAVIX) 75 mg tablet, Take 1 tablet (75 mg total) by mouth once daily., Disp: 30 tablet, Rfl: 4    glipiZIDE (GLUCOTROL) 10 MG tablet, TAKE ONE TABLET (10 MG TOTAL) BY MOUTH TWICE DAILY BEFORE MEALS, Disp: 180 tablet, Rfl: 3    JARDIANCE 25 mg tablet, TAKE ONE TABLET (25MG) BY MOUTH ONCE DAILY, Disp: 90 tablet, Rfl: 1    losartan (COZAAR) 100 MG tablet, TAKE ONE TABLET (100 MG TOTAL) BY MOUTH ONCE DAILY, Disp: 90 tablet, Rfl: 3    metFORMIN (GLUCOPHAGE-XR) 500 MG ER 24hr tablet, TAKE 2 TABLETS (1,000 MG TOTAL) BY MOUTH TWO TIMES A DAY WITH MEALS, Disp: 180 tablet, Rfl: 3    metoprolol succinate (TOPROL-XL) 50 MG 24 hr tablet, TAKE ONE TABLET (50 MG TOTAL) BY MOUTH ONCE  "DAILY, Disp: 90 tablet, Rfl: 3    ondansetron (ZOFRAN-ODT) 4 MG TbDL, Take 1 tablet (4 mg total) by mouth every 8 (eight) hours as needed., Disp: 12 tablet, Rfl: 0    rosuvastatin (CRESTOR) 20 MG tablet, Take 2 tablets (40 mg total) by mouth once daily., Disp: 180 tablet, Rfl: 3    blood sugar diagnostic (TRUE METRIX GLUCOSE TEST STRIP) Strp, USE TO TEST SUGAR ONCE DAILY, Disp: 100 strip, Rfl: 11    blood-glucose meter Misc, Test qd, Disp: 1 each, Rfl: 0    diclofenac sodium (VOLTAREN) 1 % Gel, Apply 2 g topically 4 (four) times daily as needed (pain). (Patient not taking: Reported on 6/20/2022), Disp: 100 g, Rfl: 5    nitroGLYCERIN (NITROSTAT) 0.4 MG SL tablet, Place 1 tablet (0.4 mg total) under the tongue every 5 (five) minutes as needed for Chest pain., Disp: 30 tablet, Rfl: 1    SUPREP BOWEL PREP KIT 17.5-3.13-1.6 gram SolR, , Disp: , Rfl:     Review of patient's allergies indicates:   Allergen Reactions    Aspirin     Lisinopril Other (See Comments)     Cough 2/2 aceI    Aspirin      Other^bloody diarrhea    Bydureon [exenatide microspheres]      Rash, loss of appetite.        Review of Systems   Constitutional: Negative for chills, fever, night sweats and weight gain.   Gastrointestinal: Negative for bowel incontinence, nausea and vomiting.   Genitourinary: Negative for bladder incontinence.   Neurological: Negative for disturbances in coordination and loss of balance.         Objective:        Pulse 74   Ht 5' 6" (1.676 m)   Wt 68 kg (150 lb)   SpO2 98%   BMI 24.21 kg/m²     Ortho/SPM Exam     Body habitus is normal.   The patient walks without a limp.    LEFT KNEE EXAM:  Resisted SLR negative.   The skin over the knee is intact.  Knee effusion none   Tendernes is located medial  Range of motion- Flexion 100 deg, Extension 0 deg,     Ligament exam:   MCL intact   Lachman some laxity with good endpoint              Post sag intact    LCL intact    Patellar apprehension negative.  Popliteal cyst " negative  Patellar crepitation present.  Flexion/pinch negative.    Motor normal 5/5 strength in all tested muscle groups.   Sensory normal.        Assessment:       Encounter Diagnosis   Name Primary?    Primary osteoarthritis of left knee Yes          Plan:       Diagnoses and all orders for this visit:    Primary osteoarthritis of left knee  -     Ambulatory referral/consult to Physical/Occupational Therapy; Future      He is doing well with minimal left knee pain. He had increased pain for about a week after his left knee durolane injection and then it improved. No locking, catching, or giving way. He notes decreased mobility of the knee- he cannot bend it as far back as right.     Known mild medial joint space narrowing right knee with moderate/severe medial joint space narrowing left knee with osteophytes.    Treatment options reviewed with patient and following plan made:     - Given PT orders for left knee.   - Okay to continue prn voltaren gel. No oral NSAIDs due to history of CAD and concomitant plavix.   - Can repeat durolane injection if pain gets worse.   - Follow up prn at his request.     Follow up if symptoms worsen or fail to improve.

## 2022-06-20 ENCOUNTER — OFFICE VISIT (OUTPATIENT)
Dept: ORTHOPEDICS | Facility: CLINIC | Age: 62
End: 2022-06-20
Payer: MEDICAID

## 2022-06-20 VITALS — HEART RATE: 74 BPM | HEIGHT: 66 IN | WEIGHT: 150 LBS | OXYGEN SATURATION: 98 % | BODY MASS INDEX: 24.11 KG/M2

## 2022-06-20 DIAGNOSIS — M17.12 PRIMARY OSTEOARTHRITIS OF LEFT KNEE: Primary | ICD-10-CM

## 2022-06-20 PROCEDURE — 99999 PR PBB SHADOW E&M-EST. PATIENT-LVL IV: ICD-10-PCS | Mod: PBBFAC,,, | Performed by: PHYSICIAN ASSISTANT

## 2022-06-20 PROCEDURE — 4010F PR ACE/ARB THEARPY RXD/TAKEN: ICD-10-PCS | Mod: CPTII,,, | Performed by: PHYSICIAN ASSISTANT

## 2022-06-20 PROCEDURE — 3008F PR BODY MASS INDEX (BMI) DOCUMENTED: ICD-10-PCS | Mod: CPTII,,, | Performed by: PHYSICIAN ASSISTANT

## 2022-06-20 PROCEDURE — 3044F PR MOST RECENT HEMOGLOBIN A1C LEVEL <7.0%: ICD-10-PCS | Mod: CPTII,,, | Performed by: PHYSICIAN ASSISTANT

## 2022-06-20 PROCEDURE — 99999 PR PBB SHADOW E&M-EST. PATIENT-LVL IV: CPT | Mod: PBBFAC,,, | Performed by: PHYSICIAN ASSISTANT

## 2022-06-20 PROCEDURE — 1160F RVW MEDS BY RX/DR IN RCRD: CPT | Mod: CPTII,,, | Performed by: PHYSICIAN ASSISTANT

## 2022-06-20 PROCEDURE — 1159F PR MEDICATION LIST DOCUMENTED IN MEDICAL RECORD: ICD-10-PCS | Mod: CPTII,,, | Performed by: PHYSICIAN ASSISTANT

## 2022-06-20 PROCEDURE — 99214 OFFICE O/P EST MOD 30 MIN: CPT | Mod: PBBFAC,PN | Performed by: PHYSICIAN ASSISTANT

## 2022-06-20 PROCEDURE — 1160F PR REVIEW ALL MEDS BY PRESCRIBER/CLIN PHARMACIST DOCUMENTED: ICD-10-PCS | Mod: CPTII,,, | Performed by: PHYSICIAN ASSISTANT

## 2022-06-20 PROCEDURE — 1159F MED LIST DOCD IN RCRD: CPT | Mod: CPTII,,, | Performed by: PHYSICIAN ASSISTANT

## 2022-06-20 PROCEDURE — 3044F HG A1C LEVEL LT 7.0%: CPT | Mod: CPTII,,, | Performed by: PHYSICIAN ASSISTANT

## 2022-06-20 PROCEDURE — 4010F ACE/ARB THERAPY RXD/TAKEN: CPT | Mod: CPTII,,, | Performed by: PHYSICIAN ASSISTANT

## 2022-06-20 PROCEDURE — 99213 PR OFFICE/OUTPT VISIT, EST, LEVL III, 20-29 MIN: ICD-10-PCS | Mod: S$PBB,,, | Performed by: PHYSICIAN ASSISTANT

## 2022-06-20 PROCEDURE — 99213 OFFICE O/P EST LOW 20 MIN: CPT | Mod: S$PBB,,, | Performed by: PHYSICIAN ASSISTANT

## 2022-06-20 PROCEDURE — 3008F BODY MASS INDEX DOCD: CPT | Mod: CPTII,,, | Performed by: PHYSICIAN ASSISTANT

## 2022-06-20 NOTE — PATIENT INSTRUCTIONS
It was nice to see you again today! I am glad the knee pain is better.     You have some wear and tear in your left knee (arthritis).     I gave you PT orders for the left knee.     If pain gets worse, we can consider repeat durolane injection (insurance will typically pay for every 6 months) or discuss knee replacement surgery.     Please stay in touch and call me if you need anything. You can also send me a message in [facility].     Sydnie   [phone]

## 2022-07-18 ENCOUNTER — LAB VISIT (OUTPATIENT)
Dept: LAB | Facility: HOSPITAL | Age: 62
End: 2022-07-18
Attending: FAMILY MEDICINE
Payer: MEDICAID

## 2022-07-18 DIAGNOSIS — K51.919 ULCERATIVE COLITIS WITH COMPLICATION, UNSPECIFIED LOCATION: ICD-10-CM

## 2022-07-18 DIAGNOSIS — I10 ESSENTIAL HYPERTENSION: ICD-10-CM

## 2022-07-18 DIAGNOSIS — E78.2 MIXED HYPERLIPIDEMIA: ICD-10-CM

## 2022-07-18 DIAGNOSIS — E11.69 TYPE 2 DIABETES MELLITUS WITH OTHER SPECIFIED COMPLICATION, WITHOUT LONG-TERM CURRENT USE OF INSULIN: ICD-10-CM

## 2022-07-18 LAB
ALBUMIN SERPL BCP-MCNC: 4 G/DL (ref 3.5–5.2)
ALP SERPL-CCNC: 74 U/L (ref 55–135)
ALT SERPL W/O P-5'-P-CCNC: 16 U/L (ref 10–44)
ANION GAP SERPL CALC-SCNC: 8 MMOL/L (ref 8–16)
AST SERPL-CCNC: 13 U/L (ref 10–40)
BASOPHILS # BLD AUTO: 0.05 K/UL (ref 0–0.2)
BASOPHILS NFR BLD: 0.5 % (ref 0–1.9)
BILIRUB SERPL-MCNC: 0.7 MG/DL (ref 0.1–1)
BUN SERPL-MCNC: 12 MG/DL (ref 8–23)
CALCIUM SERPL-MCNC: 9.5 MG/DL (ref 8.7–10.5)
CHLORIDE SERPL-SCNC: 103 MMOL/L (ref 95–110)
CO2 SERPL-SCNC: 26 MMOL/L (ref 23–29)
CREAT SERPL-MCNC: 0.8 MG/DL (ref 0.5–1.4)
DIFFERENTIAL METHOD: ABNORMAL
EOSINOPHIL # BLD AUTO: 0.2 K/UL (ref 0–0.5)
EOSINOPHIL NFR BLD: 1.7 % (ref 0–8)
ERYTHROCYTE [DISTWIDTH] IN BLOOD BY AUTOMATED COUNT: 13 % (ref 11.5–14.5)
EST. GFR  (AFRICAN AMERICAN): >60 ML/MIN/1.73 M^2
EST. GFR  (NON AFRICAN AMERICAN): >60 ML/MIN/1.73 M^2
ESTIMATED AVG GLUCOSE: 157 MG/DL (ref 68–131)
GLUCOSE SERPL-MCNC: 120 MG/DL (ref 70–110)
HBA1C MFR BLD: 7.1 % (ref 4–5.6)
HCT VFR BLD AUTO: 40 % (ref 40–54)
HGB BLD-MCNC: 13.1 G/DL (ref 14–18)
IMM GRANULOCYTES # BLD AUTO: 0.03 K/UL (ref 0–0.04)
IMM GRANULOCYTES NFR BLD AUTO: 0.3 % (ref 0–0.5)
LYMPHOCYTES # BLD AUTO: 1.2 K/UL (ref 1–4.8)
LYMPHOCYTES NFR BLD: 11.5 % (ref 18–48)
MCH RBC QN AUTO: 28.4 PG (ref 27–31)
MCHC RBC AUTO-ENTMCNC: 32.8 G/DL (ref 32–36)
MCV RBC AUTO: 87 FL (ref 82–98)
MONOCYTES # BLD AUTO: 0.9 K/UL (ref 0.3–1)
MONOCYTES NFR BLD: 8.5 % (ref 4–15)
NEUTROPHILS # BLD AUTO: 8 K/UL (ref 1.8–7.7)
NEUTROPHILS NFR BLD: 77.5 % (ref 38–73)
NRBC BLD-RTO: 0 /100 WBC
PLATELET # BLD AUTO: 223 K/UL (ref 150–450)
PMV BLD AUTO: 11.5 FL (ref 9.2–12.9)
POTASSIUM SERPL-SCNC: 4.7 MMOL/L (ref 3.5–5.1)
PROT SERPL-MCNC: 7.3 G/DL (ref 6–8.4)
RBC # BLD AUTO: 4.61 M/UL (ref 4.6–6.2)
SODIUM SERPL-SCNC: 137 MMOL/L (ref 136–145)
WBC # BLD AUTO: 10.35 K/UL (ref 3.9–12.7)

## 2022-07-18 PROCEDURE — 36415 COLL VENOUS BLD VENIPUNCTURE: CPT | Mod: PN | Performed by: FAMILY MEDICINE

## 2022-07-18 PROCEDURE — 80053 COMPREHEN METABOLIC PANEL: CPT | Performed by: FAMILY MEDICINE

## 2022-07-18 PROCEDURE — 85025 COMPLETE CBC W/AUTO DIFF WBC: CPT | Performed by: FAMILY MEDICINE

## 2022-07-18 PROCEDURE — 83036 HEMOGLOBIN GLYCOSYLATED A1C: CPT | Performed by: FAMILY MEDICINE

## 2022-07-22 ENCOUNTER — OFFICE VISIT (OUTPATIENT)
Dept: FAMILY MEDICINE | Facility: CLINIC | Age: 62
End: 2022-07-22
Payer: MEDICAID

## 2022-07-22 VITALS
OXYGEN SATURATION: 99 % | HEART RATE: 71 BPM | BODY MASS INDEX: 24.55 KG/M2 | TEMPERATURE: 98 F | HEIGHT: 66 IN | WEIGHT: 152.75 LBS | DIASTOLIC BLOOD PRESSURE: 72 MMHG | SYSTOLIC BLOOD PRESSURE: 120 MMHG

## 2022-07-22 DIAGNOSIS — E78.2 MIXED HYPERLIPIDEMIA: ICD-10-CM

## 2022-07-22 DIAGNOSIS — E11.69 TYPE 2 DIABETES MELLITUS WITH OTHER SPECIFIED COMPLICATION, WITHOUT LONG-TERM CURRENT USE OF INSULIN: ICD-10-CM

## 2022-07-22 DIAGNOSIS — Z12.5 SCREENING FOR MALIGNANT NEOPLASM OF PROSTATE: Primary | ICD-10-CM

## 2022-07-22 DIAGNOSIS — I10 ESSENTIAL HYPERTENSION: ICD-10-CM

## 2022-07-22 PROCEDURE — 3078F PR MOST RECENT DIASTOLIC BLOOD PRESSURE < 80 MM HG: ICD-10-PCS | Mod: CPTII,,, | Performed by: FAMILY MEDICINE

## 2022-07-22 PROCEDURE — 4010F PR ACE/ARB THEARPY RXD/TAKEN: ICD-10-PCS | Mod: CPTII,,, | Performed by: FAMILY MEDICINE

## 2022-07-22 PROCEDURE — 99214 OFFICE O/P EST MOD 30 MIN: CPT | Mod: PBBFAC,PO | Performed by: FAMILY MEDICINE

## 2022-07-22 PROCEDURE — 99999 PR PBB SHADOW E&M-EST. PATIENT-LVL IV: ICD-10-PCS | Mod: PBBFAC,,, | Performed by: FAMILY MEDICINE

## 2022-07-22 PROCEDURE — 3078F DIAST BP <80 MM HG: CPT | Mod: CPTII,,, | Performed by: FAMILY MEDICINE

## 2022-07-22 PROCEDURE — 3074F PR MOST RECENT SYSTOLIC BLOOD PRESSURE < 130 MM HG: ICD-10-PCS | Mod: CPTII,,, | Performed by: FAMILY MEDICINE

## 2022-07-22 PROCEDURE — 4010F ACE/ARB THERAPY RXD/TAKEN: CPT | Mod: CPTII,,, | Performed by: FAMILY MEDICINE

## 2022-07-22 PROCEDURE — 1160F RVW MEDS BY RX/DR IN RCRD: CPT | Mod: CPTII,,, | Performed by: FAMILY MEDICINE

## 2022-07-22 PROCEDURE — 99214 PR OFFICE/OUTPT VISIT, EST, LEVL IV, 30-39 MIN: ICD-10-PCS | Mod: S$PBB,,, | Performed by: FAMILY MEDICINE

## 2022-07-22 PROCEDURE — 1159F PR MEDICATION LIST DOCUMENTED IN MEDICAL RECORD: ICD-10-PCS | Mod: CPTII,,, | Performed by: FAMILY MEDICINE

## 2022-07-22 PROCEDURE — 99214 OFFICE O/P EST MOD 30 MIN: CPT | Mod: S$PBB,,, | Performed by: FAMILY MEDICINE

## 2022-07-22 PROCEDURE — 1160F PR REVIEW ALL MEDS BY PRESCRIBER/CLIN PHARMACIST DOCUMENTED: ICD-10-PCS | Mod: CPTII,,, | Performed by: FAMILY MEDICINE

## 2022-07-22 PROCEDURE — 3051F HG A1C>EQUAL 7.0%<8.0%: CPT | Mod: CPTII,,, | Performed by: FAMILY MEDICINE

## 2022-07-22 PROCEDURE — 3008F PR BODY MASS INDEX (BMI) DOCUMENTED: ICD-10-PCS | Mod: CPTII,,, | Performed by: FAMILY MEDICINE

## 2022-07-22 PROCEDURE — 99499 UNLISTED E&M SERVICE: CPT | Mod: S$PBB,,, | Performed by: FAMILY MEDICINE

## 2022-07-22 PROCEDURE — 3074F SYST BP LT 130 MM HG: CPT | Mod: CPTII,,, | Performed by: FAMILY MEDICINE

## 2022-07-22 PROCEDURE — 99999 PR PBB SHADOW E&M-EST. PATIENT-LVL IV: CPT | Mod: PBBFAC,,, | Performed by: FAMILY MEDICINE

## 2022-07-22 PROCEDURE — 1159F MED LIST DOCD IN RCRD: CPT | Mod: CPTII,,, | Performed by: FAMILY MEDICINE

## 2022-07-22 PROCEDURE — 99499 RISK ADDL DX/OHS AUDIT: ICD-10-PCS | Mod: S$PBB,,, | Performed by: FAMILY MEDICINE

## 2022-07-22 PROCEDURE — 3051F PR MOST RECENT HEMOGLOBIN A1C LEVEL 7.0 - < 8.0%: ICD-10-PCS | Mod: CPTII,,, | Performed by: FAMILY MEDICINE

## 2022-07-22 PROCEDURE — 3008F BODY MASS INDEX DOCD: CPT | Mod: CPTII,,, | Performed by: FAMILY MEDICINE

## 2022-07-22 RX ORDER — ROSUVASTATIN CALCIUM 40 MG/1
40 TABLET, COATED ORAL DAILY
Qty: 90 TABLET | Refills: 3 | Status: SHIPPED | OUTPATIENT
Start: 2022-07-22 | End: 2023-06-05 | Stop reason: SDUPTHER

## 2022-07-22 RX ORDER — DICLOFENAC SODIUM 10 MG/G
2 GEL TOPICAL 4 TIMES DAILY PRN
Qty: 100 G | Refills: 5 | Status: SHIPPED | OUTPATIENT
Start: 2022-07-22 | End: 2022-12-02

## 2022-07-22 NOTE — PROGRESS NOTES
(Portions of this note were dictated using voice recognition software and may contain dictation related errors in spelling/grammar/syntax not found on text review)         HPI: 62 y.o. male       Diabetes, on metformin  2 pills am/2 pill pm.  glipizide 10 mg twice a day, Jardiance 25 mg daily.  Tried to switch him to weekly G LP 1 agonist with Trulicity but apparently this was not covered and recommendation was to try bydureon, Victoza, or Byetta 1st.  Tried later by to do Bydureon weekly but apparently had some local allergic reaction with injections.  Had advised on potential of Victoza although he did not seem interested in doing a daily injection.  Last A1c was 6.9, had increased to 7.6  3 months ago but currently down to 7.1.    Does not take aspirin because of ALLERGY.    eye exam :  Up-to-date (Dr. Rapp)  Sees a dentist regularly.    No neuropathy symptoms     CAD:  Presentation march 2017 with angina. CAD on angio, no revasc ability. Medically managed with statin (was on Lipitor 80 but given suboptimal LDL switched over to Crestor 40), plavix, metoprolol suc 25 daily, losartan 100 mg daily, imdur 30 mg daily.  Had a nuclear stress test done last in December 2, 2020. Normal perfusion scan.  EF normal at rest and with stress.  Last cardiology visit was on 11/05/2020     Hypertension on losartan 100 mg daily,metoprolol 50 mg daily,Amlodipine 5 mg daily .        Ulcerative colitis, prior followed by GI. Currently not taking mesalamine (asymptomatic at this time)    Omeprazole PRN for GERD sx    Chronic left knee pain, no recent injury.  No swelling of the knee.  Does get stiff sometimes.  X-ray in June showed calcification along MCL suggesting ligamentous injury.  Medial compartment narrowing  left greater than right, moderate left-sided suprapatellar joint effusion and superior and inferior patellar articular surface spurring.  Had given topical diclofenac gel, had discussed potential physical  therapy.  Had consulted with Orthopedics at last visit, doing viscosupplementation  .  He is with PT as well    Fell and fractured his distal right radius had ORIF done at [facility] in 2022. Doing OT, overall improving     Does notice some minor decrease in appetite any some weight loss but no other systemic symptoms.  No abdominal pain.    Mild stable anemia, has had normal B12 testing in the past, had low iron at 1 time.  Is vegetarian but not vegan    Past Medical History:   Diagnosis Date    Coronary artery disease involving native coronary artery of native heart without angina pectoris 2017    Diabetes mellitus, type II     HTN (hypertension)     Mixed hyperlipidemia 10/24/2019    Ulcerative colitis        Past Surgical History:   Procedure Laterality Date    CATARACT EXTRACTION Bilateral     COLONOSCOPY N/A 2016    Procedure: COLONOSCOPY;  Surgeon: Aristeo Lynn Jr., MD;  Location: [facility] ENDO;  Service: Endoscopy;  Laterality: N/A;    COLONOSCOPY N/A 10/13/2020    Procedure: COLONOSCOPY;  Surgeon: Charly Travis MD;  Location: [facility] ENDO;  Service: Endoscopy;  Laterality: N/A;    COLONOSCOPY N/A 5/3/2021    Procedure: COLONOSCOPY;  Surgeon: Charly Travis MD;  Location: [facility] ENDO;  Service: Endoscopy;  Laterality: N/A;       Family History   Problem Relation Age of Onset    Coronary artery disease Mother 75    Coronary artery disease Father 55    Coronary artery disease Brother 59    Diabetes Brother     Prostate cancer Neg Hx     Colon cancer Neg Hx        Social History     Tobacco Use    Smoking status: Former Smoker     Quit date: 3/16/2008     Years since quittin.3    Smokeless tobacco: Former User     Quit date: 2009   Substance Use Topics    Alcohol use: Not Currently     Alcohol/week: 2.0 standard drinks     Types: 1 Shots of liquor, 1 Glasses of wine per week     Comment: occasionally    Drug use: No       Lab Results   Component Value Date    WBC  10.35 07/18/2022    HGB 13.1 (L) 07/18/2022    HCT 40.0 07/18/2022    MCV 87 07/18/2022     07/18/2022    CHOL 140 01/07/2022    TRIG 161 (H) 01/07/2022    HDL 44 01/07/2022    ALT 16 07/18/2022    AST 13 07/18/2022    BILITOT 0.7 07/18/2022    ALKPHOS 74 07/18/2022     07/18/2022    K 4.7 07/18/2022     07/18/2022    CREATININE 0.8 07/18/2022    ESTGFRAFRICA >60.0 07/18/2022    EGFRNONAA >60.0 07/18/2022    CALCIUM 9.5 07/18/2022    ALBUMIN 4.0 07/18/2022    BUN 12 07/18/2022    CO2 26 07/18/2022    TSH 2.519 06/09/2021    PSA 0.67 09/21/2021    INR 1.1 03/09/2017    HGBA1C 7.1 (H) 07/18/2022    MICALBCREAT Unable to calculate 06/09/2021    LDLCALC 63.8 01/07/2022     (H) 07/18/2022       Hemoglobin A1C (%)   Date Value   07/18/2022 7.1 (H)   04/06/2022 7.6 (H)   01/07/2022 6.9 (H)   09/21/2021 7.7 (H)   06/09/2021 7.6 (H)   05/08/2020 8.9 (H)   01/31/2020 8.4 (H)   11/04/2019 8.1 (H)   11/05/2018 8.4 (H)   12/05/2017 7.7 (H)   09/06/2017 9.3 (H)              PE (elements able to be captured on virtual encounter)  APPEARANCE: Well nourished, well developed, in no acute distress.    HEAD: Normocephalic, atraumatic.  EYES:  .   Conjunctivae noninjected.  RESPIRATORY:  No increased work of breathing or objective visual signs of dyspnea  PSYCHIATRIC:  Normal mood and affect, alert and oriented, appropriate answers to questions      IMPRESSION  Encounter Diagnoses   Name Primary?    Mixed hyperlipidemia     Screening for malignant neoplasm of prostate Yes    Type 2 diabetes mellitus with other specified complication, without long-term current use of insulin     Essential hypertension            PLAN  Diabetes mildly suboptimal but improving. . Continue current meds. Recheck 3 mo    HLD: controlled.     CAD stable, no issues    HTN controlled       UC: no symptoms, no meds, c/scope utd below    F/u 3 mo with labs prior  Orders Placed This Encounter   Procedures    CBC Auto Differential     Comprehensive Metabolic Panel    Lipid Panel    Hemoglobin A1C    Microalbumin/Creatinine Ratio, Urine    PSA, Screening       HEALTH SCREENINGS   Immunizations:  Pneumovax 2015  Tetanus around 2013   zoster:  Up-to-date  COVID vaccine (J/J) 03/08/2021.  Pfizer booster on 12/07/2021, encourage booster vaccine.     Age/Gender Appropriate screenings:  Colonoscopy in 2021 diffuse mildly altered vascular mucosa distal descending colon and rectum and sigmoid colon, 5 mm sigmoid polyp, repeat colonoscopy 2 years  Prostate screening done  9/2021

## 2022-08-13 NOTE — TELEPHONE ENCOUNTER
No new care gaps identified.  [facility] Embedded Care Gaps. Reference number: [id number]. 8/13/2022   2:56:57 PM CDT

## 2022-08-14 RX ORDER — LOSARTAN POTASSIUM 100 MG/1
TABLET ORAL
Qty: 90 TABLET | Refills: 3 | Status: SHIPPED | OUTPATIENT
Start: 2022-08-14 | End: 2023-06-05 | Stop reason: SDUPTHER

## 2022-08-14 NOTE — TELEPHONE ENCOUNTER
Refill Decision Note   Michoacano Lind  is requesting a refill authorization.  Brief Assessment and Rationale for Refill:  Approve     Medication Therapy Plan:       Medication Reconciliation Completed: No   Comments:     No Care Gaps recommended.     Note composed:4:18 PM 08/14/2022

## 2022-10-13 ENCOUNTER — PATIENT MESSAGE (OUTPATIENT)
Dept: ADMINISTRATIVE | Facility: OTHER | Age: 62
End: 2022-10-13
Payer: MEDICAID

## 2022-10-19 RX ORDER — METOPROLOL SUCCINATE 50 MG/1
TABLET, EXTENDED RELEASE ORAL
Qty: 90 TABLET | Refills: 3 | Status: SHIPPED | OUTPATIENT
Start: 2022-10-19 | End: 2023-06-05 | Stop reason: SDUPTHER

## 2022-10-19 NOTE — TELEPHONE ENCOUNTER
Refill Decision Note   Michoacano Lind  is requesting a refill authorization.  Brief Assessment and Rationale for Refill:  Approve     Medication Therapy Plan:       Medication Reconciliation Completed: No   Comments:     No Care Gaps recommended.     Note composed:1:58 PM 10/19/2022

## 2022-10-19 NOTE — TELEPHONE ENCOUNTER
Care Due:                  Date            Visit Type   Department     Provider  --------------------------------------------------------------------------------                                EP -                              [facility]  Last Visit: 07-      CARE ([facility])   [facility]  Next Visit: 01-      CARE ([facility])   [facility]                                                            Last  Test          Frequency    Reason                     Performed    Due Date  --------------------------------------------------------------------------------    HBA1C.......  6 months...  JARDIANCE, glipiZIDE,      07- 01-                             metFORMIN................    Lipid Panel.  12 months..  rosuvastatin.............  01- 01-    [facility] Embedded Care Gaps. Reference number: [id number]. 10/19/2022   10:55:22 AM CDT

## 2022-11-17 LAB
LEFT EYE DM RETINOPATHY: NEGATIVE
RIGHT EYE DM RETINOPATHY: NEGATIVE

## 2022-11-30 ENCOUNTER — PATIENT OUTREACH (OUTPATIENT)
Dept: ADMINISTRATIVE | Facility: HOSPITAL | Age: 62
End: 2022-11-30
Payer: MEDICAID

## 2022-11-30 NOTE — PROGRESS NOTES
Care Everywhere updates requested and reviewed.  Immunizations reconciled. Media reports reviewed.  Duplicate HM overrides and  orders removed.  Overdue HM topic chart audit and/or requested.  Overdue lab testing linked to upcoming lab appointments if applies.      HM updated with external EYE EXAM report.       Health Maintenance Due   Topic Date Due    COVID-19 Vaccine (3 - Booster for Rachid series) 2022    Diabetes Urine Screening  2022    Influenza Vaccine (1) 2022    Foot Exam  10/08/2022

## 2023-01-17 ENCOUNTER — PATIENT MESSAGE (OUTPATIENT)
Dept: FAMILY MEDICINE | Facility: CLINIC | Age: 63
End: 2023-01-17
Payer: MEDICAID

## 2023-01-17 ENCOUNTER — PATIENT MESSAGE (OUTPATIENT)
Dept: ADMINISTRATIVE | Facility: HOSPITAL | Age: 63
End: 2023-01-17
Payer: MEDICAID

## 2023-01-20 ENCOUNTER — LAB VISIT (OUTPATIENT)
Dept: LAB | Facility: HOSPITAL | Age: 63
End: 2023-01-20
Attending: FAMILY MEDICINE
Payer: MEDICAID

## 2023-01-20 DIAGNOSIS — Z12.5 SCREENING FOR MALIGNANT NEOPLASM OF PROSTATE: ICD-10-CM

## 2023-01-20 DIAGNOSIS — E78.2 MIXED HYPERLIPIDEMIA: ICD-10-CM

## 2023-01-20 DIAGNOSIS — I10 ESSENTIAL HYPERTENSION: ICD-10-CM

## 2023-01-20 DIAGNOSIS — E11.69 TYPE 2 DIABETES MELLITUS WITH OTHER SPECIFIED COMPLICATION, WITHOUT LONG-TERM CURRENT USE OF INSULIN: ICD-10-CM

## 2023-01-20 LAB
ALBUMIN SERPL BCP-MCNC: 4.1 G/DL (ref 3.5–5.2)
ALP SERPL-CCNC: 72 U/L (ref 55–135)
ALT SERPL W/O P-5'-P-CCNC: 17 U/L (ref 10–44)
ANION GAP SERPL CALC-SCNC: 11 MMOL/L (ref 8–16)
AST SERPL-CCNC: 14 U/L (ref 10–40)
BASOPHILS # BLD AUTO: 0.1 K/UL (ref 0–0.2)
BASOPHILS NFR BLD: 0.9 % (ref 0–1.9)
BILIRUB SERPL-MCNC: 0.5 MG/DL (ref 0.1–1)
BUN SERPL-MCNC: 12 MG/DL (ref 8–23)
CALCIUM SERPL-MCNC: 9.6 MG/DL (ref 8.7–10.5)
CHLORIDE SERPL-SCNC: 99 MMOL/L (ref 95–110)
CHOLEST SERPL-MCNC: 218 MG/DL (ref 120–199)
CHOLEST/HDLC SERPL: 5.7 {RATIO} (ref 2–5)
CO2 SERPL-SCNC: 24 MMOL/L (ref 23–29)
COMPLEXED PSA SERPL-MCNC: 0.97 NG/ML (ref 0–4)
CREAT SERPL-MCNC: 0.8 MG/DL (ref 0.5–1.4)
DIFFERENTIAL METHOD: ABNORMAL
EOSINOPHIL # BLD AUTO: 0.6 K/UL (ref 0–0.5)
EOSINOPHIL NFR BLD: 5.6 % (ref 0–8)
ERYTHROCYTE [DISTWIDTH] IN BLOOD BY AUTOMATED COUNT: 12.5 % (ref 11.5–14.5)
EST. GFR  (NO RACE VARIABLE): >60 ML/MIN/1.73 M^2
ESTIMATED AVG GLUCOSE: 177 MG/DL (ref 68–131)
GLUCOSE SERPL-MCNC: 150 MG/DL (ref 70–110)
HBA1C MFR BLD: 7.8 % (ref 4–5.6)
HCT VFR BLD AUTO: 41.6 % (ref 40–54)
HDLC SERPL-MCNC: 38 MG/DL (ref 40–75)
HDLC SERPL: 17.4 % (ref 20–50)
HGB BLD-MCNC: 13.1 G/DL (ref 14–18)
IMM GRANULOCYTES # BLD AUTO: 0.04 K/UL (ref 0–0.04)
IMM GRANULOCYTES NFR BLD AUTO: 0.4 % (ref 0–0.5)
LDLC SERPL CALC-MCNC: 128.2 MG/DL (ref 63–159)
LYMPHOCYTES # BLD AUTO: 2.4 K/UL (ref 1–4.8)
LYMPHOCYTES NFR BLD: 22.3 % (ref 18–48)
MCH RBC QN AUTO: 28.3 PG (ref 27–31)
MCHC RBC AUTO-ENTMCNC: 31.5 G/DL (ref 32–36)
MCV RBC AUTO: 90 FL (ref 82–98)
MONOCYTES # BLD AUTO: 0.7 K/UL (ref 0.3–1)
MONOCYTES NFR BLD: 6.3 % (ref 4–15)
NEUTROPHILS # BLD AUTO: 6.9 K/UL (ref 1.8–7.7)
NEUTROPHILS NFR BLD: 64.5 % (ref 38–73)
NONHDLC SERPL-MCNC: 180 MG/DL
NRBC BLD-RTO: 0 /100 WBC
PLATELET # BLD AUTO: 281 K/UL (ref 150–450)
PMV BLD AUTO: 10.6 FL (ref 9.2–12.9)
POTASSIUM SERPL-SCNC: 4.3 MMOL/L (ref 3.5–5.1)
PROT SERPL-MCNC: 7.8 G/DL (ref 6–8.4)
RBC # BLD AUTO: 4.63 M/UL (ref 4.6–6.2)
SODIUM SERPL-SCNC: 134 MMOL/L (ref 136–145)
TRIGL SERPL-MCNC: 259 MG/DL (ref 30–150)
WBC # BLD AUTO: 10.68 K/UL (ref 3.9–12.7)

## 2023-01-20 PROCEDURE — 36415 COLL VENOUS BLD VENIPUNCTURE: CPT | Mod: PN | Performed by: FAMILY MEDICINE

## 2023-01-20 PROCEDURE — 83036 HEMOGLOBIN GLYCOSYLATED A1C: CPT | Performed by: FAMILY MEDICINE

## 2023-01-20 PROCEDURE — 80053 COMPREHEN METABOLIC PANEL: CPT | Performed by: FAMILY MEDICINE

## 2023-01-20 PROCEDURE — 80061 LIPID PANEL: CPT | Performed by: FAMILY MEDICINE

## 2023-01-20 PROCEDURE — 85025 COMPLETE CBC W/AUTO DIFF WBC: CPT | Performed by: FAMILY MEDICINE

## 2023-01-20 PROCEDURE — 84153 ASSAY OF PSA TOTAL: CPT | Performed by: FAMILY MEDICINE

## 2023-01-27 ENCOUNTER — OFFICE VISIT (OUTPATIENT)
Dept: FAMILY MEDICINE | Facility: CLINIC | Age: 63
End: 2023-01-27
Payer: MEDICAID

## 2023-01-27 VITALS
OXYGEN SATURATION: 99 % | WEIGHT: 156.06 LBS | BODY MASS INDEX: 25.08 KG/M2 | HEIGHT: 66 IN | SYSTOLIC BLOOD PRESSURE: 118 MMHG | HEART RATE: 78 BPM | DIASTOLIC BLOOD PRESSURE: 76 MMHG | TEMPERATURE: 98 F

## 2023-01-27 DIAGNOSIS — K51.919 ULCERATIVE COLITIS WITH COMPLICATION, UNSPECIFIED LOCATION: ICD-10-CM

## 2023-01-27 DIAGNOSIS — I25.119 CORONARY ARTERY DISEASE INVOLVING NATIVE CORONARY ARTERY OF NATIVE HEART WITH ANGINA PECTORIS: ICD-10-CM

## 2023-01-27 DIAGNOSIS — E78.2 MIXED HYPERLIPIDEMIA: ICD-10-CM

## 2023-01-27 DIAGNOSIS — E11.69 TYPE 2 DIABETES MELLITUS WITH OTHER SPECIFIED COMPLICATION, WITHOUT LONG-TERM CURRENT USE OF INSULIN: Primary | ICD-10-CM

## 2023-01-27 PROCEDURE — 1160F PR REVIEW ALL MEDS BY PRESCRIBER/CLIN PHARMACIST DOCUMENTED: ICD-10-PCS | Mod: CPTII,,, | Performed by: FAMILY MEDICINE

## 2023-01-27 PROCEDURE — 3061F NEG MICROALBUMINURIA REV: CPT | Mod: CPTII,,, | Performed by: FAMILY MEDICINE

## 2023-01-27 PROCEDURE — 99499 RISK ADDL DX/OHS AUDIT: ICD-10-PCS | Mod: S$PBB,,, | Performed by: FAMILY MEDICINE

## 2023-01-27 PROCEDURE — 1159F PR MEDICATION LIST DOCUMENTED IN MEDICAL RECORD: ICD-10-PCS | Mod: CPTII,,, | Performed by: FAMILY MEDICINE

## 2023-01-27 PROCEDURE — 99214 OFFICE O/P EST MOD 30 MIN: CPT | Mod: PBBFAC,PO | Performed by: FAMILY MEDICINE

## 2023-01-27 PROCEDURE — 1160F RVW MEDS BY RX/DR IN RCRD: CPT | Mod: CPTII,,, | Performed by: FAMILY MEDICINE

## 2023-01-27 PROCEDURE — 99214 PR OFFICE/OUTPT VISIT, EST, LEVL IV, 30-39 MIN: ICD-10-PCS | Mod: 25,S$PBB,, | Performed by: FAMILY MEDICINE

## 2023-01-27 PROCEDURE — 3074F SYST BP LT 130 MM HG: CPT | Mod: CPTII,,, | Performed by: FAMILY MEDICINE

## 2023-01-27 PROCEDURE — 99499 UNLISTED E&M SERVICE: CPT | Mod: S$PBB,,, | Performed by: FAMILY MEDICINE

## 2023-01-27 PROCEDURE — 3061F PR NEG MICROALBUMINURIA RESULT DOCUMENTED/REVIEW: ICD-10-PCS | Mod: CPTII,,, | Performed by: FAMILY MEDICINE

## 2023-01-27 PROCEDURE — 99999 PR PBB SHADOW E&M-EST. PATIENT-LVL IV: ICD-10-PCS | Mod: PBBFAC,,, | Performed by: FAMILY MEDICINE

## 2023-01-27 PROCEDURE — 3078F DIAST BP <80 MM HG: CPT | Mod: CPTII,,, | Performed by: FAMILY MEDICINE

## 2023-01-27 PROCEDURE — 3051F PR MOST RECENT HEMOGLOBIN A1C LEVEL 7.0 - < 8.0%: ICD-10-PCS | Mod: CPTII,,, | Performed by: FAMILY MEDICINE

## 2023-01-27 PROCEDURE — 1159F MED LIST DOCD IN RCRD: CPT | Mod: CPTII,,, | Performed by: FAMILY MEDICINE

## 2023-01-27 PROCEDURE — 99214 OFFICE O/P EST MOD 30 MIN: CPT | Mod: 25,S$PBB,, | Performed by: FAMILY MEDICINE

## 2023-01-27 PROCEDURE — 4010F PR ACE/ARB THEARPY RXD/TAKEN: ICD-10-PCS | Mod: CPTII,,, | Performed by: FAMILY MEDICINE

## 2023-01-27 PROCEDURE — 3066F PR DOCUMENTATION OF TREATMENT FOR NEPHROPATHY: ICD-10-PCS | Mod: CPTII,,, | Performed by: FAMILY MEDICINE

## 2023-01-27 PROCEDURE — 4010F ACE/ARB THERAPY RXD/TAKEN: CPT | Mod: CPTII,,, | Performed by: FAMILY MEDICINE

## 2023-01-27 PROCEDURE — 3008F PR BODY MASS INDEX (BMI) DOCUMENTED: ICD-10-PCS | Mod: CPTII,,, | Performed by: FAMILY MEDICINE

## 2023-01-27 PROCEDURE — 3008F BODY MASS INDEX DOCD: CPT | Mod: CPTII,,, | Performed by: FAMILY MEDICINE

## 2023-01-27 PROCEDURE — 3074F PR MOST RECENT SYSTOLIC BLOOD PRESSURE < 130 MM HG: ICD-10-PCS | Mod: CPTII,,, | Performed by: FAMILY MEDICINE

## 2023-01-27 PROCEDURE — 3066F NEPHROPATHY DOC TX: CPT | Mod: CPTII,,, | Performed by: FAMILY MEDICINE

## 2023-01-27 PROCEDURE — 99999 PR PBB SHADOW E&M-EST. PATIENT-LVL IV: CPT | Mod: PBBFAC,,, | Performed by: FAMILY MEDICINE

## 2023-01-27 PROCEDURE — 90471 IMMUNIZATION ADMIN: CPT | Mod: PBBFAC,PO

## 2023-01-27 PROCEDURE — 3078F PR MOST RECENT DIASTOLIC BLOOD PRESSURE < 80 MM HG: ICD-10-PCS | Mod: CPTII,,, | Performed by: FAMILY MEDICINE

## 2023-01-27 PROCEDURE — 3051F HG A1C>EQUAL 7.0%<8.0%: CPT | Mod: CPTII,,, | Performed by: FAMILY MEDICINE

## 2023-01-27 RX ORDER — INSULIN PUMP SYRINGE, 3 ML
EACH MISCELLANEOUS
Qty: 1 EACH | Refills: 0 | Status: SHIPPED | OUTPATIENT
Start: 2023-01-27 | End: 2024-01-27

## 2023-01-27 RX ORDER — LANCETS
EACH MISCELLANEOUS
Qty: 100 EACH | Refills: 11 | Status: SHIPPED | OUTPATIENT
Start: 2023-01-27

## 2023-01-27 NOTE — PROGRESS NOTES
(Portions of this note were dictated using voice recognition software and may contain dictation related errors in spelling/grammar/syntax not found on text review)         Chief Complaint   Patient presents with    Follow-up       HPI: 62 y.o. male       Diabetes, on metformin  2 pills am/2 pill pm.  glipizide 10 mg twice a day, Jardiance 25 mg daily.  Tried to switch him to weekly G LP 1 agonist with Trulicity but apparently this was not covered and recommendation was to try bydureon, Victoza, or Byetta 1st.  Tried later by to do Bydureon weekly but apparently had some local allergic reaction with injections.  Had advised on potential of Victoza although he did not seem interested in doing a daily injection.  A1c did increase to 7.8 from 7.1 before in July 2022.  Does admit to increase carbohydrate intake recently.  not much exercise because of his knee and cold weather.    Does not take aspirin because of ALLERGY.    eye exam :  Up-to-date (Dr. Rapp)  Sees a dentist regularly.    No neuropathy symptoms     CAD:  Presentation march 2017 with angina. CAD on angio, no revasc ability. Medically managed with statin (was on Lipitor 80 but given suboptimal LDL switched over to Crestor 40), plavix, metoprolol suc 25 daily, losartan 100 mg daily, imdur 30 mg daily.  Had a nuclear stress test done last in December 2, 2020. Normal perfusion scan.  EF normal at rest and with stress.  Last cardiology visit was on 11/05/2020     Hypertension on losartan 100 mg daily,metoprolol 50 mg daily,Amlodipine 5 mg daily .        Ulcerative colitis, prior followed by GI. Currently not taking mesalamine (asymptomatic at this time)    Omeprazole PRN for GERD sx    Chronic left knee pain, no recent injury.  No swelling of the knee.  Does get stiff sometimes.  X-ray in June showed calcification along MCL suggesting ligamentous injury.  Medial compartment narrowing  left greater than right, moderate left-sided suprapatellar joint  effusion and superior and inferior patellar articular surface spurring.  Had given topical diclofenac gel, had discussed potential physical therapy.  Had consulted with Orthopedics at last visit, doing viscosupplementation  .  He is with PT as well    Fell and fractured his distal right radius had ORIF done at [facility] in 2022. Doing OT, overall improving    Mild stable anemia, has had normal B12 testing in the past, had low iron at 1 time.  Is vegetarian but not vegan    Past Medical History:   Diagnosis Date    Coronary artery disease involving native coronary artery of native heart without angina pectoris 2017    Diabetes mellitus, type II     HTN (hypertension)     Mixed hyperlipidemia 10/24/2019    Ulcerative colitis        Past Surgical History:   Procedure Laterality Date    CATARACT EXTRACTION Bilateral     COLONOSCOPY N/A 2016    Procedure: COLONOSCOPY;  Surgeon: Aristeo Lynn Jr., MD;  Location: [facility] ENDO;  Service: Endoscopy;  Laterality: N/A;    COLONOSCOPY N/A 10/13/2020    Procedure: COLONOSCOPY;  Surgeon: Charly Travis MD;  Location: [facility] ENDO;  Service: Endoscopy;  Laterality: N/A;    COLONOSCOPY N/A 5/3/2021    Procedure: COLONOSCOPY;  Surgeon: Charly Travis MD;  Location: [facility] ENDO;  Service: Endoscopy;  Laterality: N/A;       Family History   Problem Relation Age of Onset    Coronary artery disease Mother 75    Coronary artery disease Father 55    Coronary artery disease Brother 59    Diabetes Brother     Prostate cancer Neg Hx     Colon cancer Neg Hx        Social History     Tobacco Use    Smoking status: Former     Types: Cigarettes     Quit date: 3/16/2008     Years since quittin.8    Smokeless tobacco: Former     Quit date: 2009   Substance Use Topics    Alcohol use: Not Currently     Alcohol/week: 2.0 standard drinks     Types: 1 Shots of liquor, 1 Glasses of wine per week     Comment: occasionally    Drug use: No       Lab Results   Component Value  Date    WBC 10.68 01/20/2023    HGB 13.1 (L) 01/20/2023    HCT 41.6 01/20/2023    MCV 90 01/20/2023     01/20/2023    CHOL 218 (H) 01/20/2023    TRIG 259 (H) 01/20/2023    HDL 38 (L) 01/20/2023    ALT 17 01/20/2023    AST 14 01/20/2023    BILITOT 0.5 01/20/2023    ALKPHOS 72 01/20/2023     (L) 01/20/2023    K 4.3 01/20/2023    CL 99 01/20/2023    CREATININE 0.8 01/20/2023    ESTGFRAFRICA >60.0 07/18/2022    EGFRNONAA >60.0 07/18/2022    CALCIUM 9.6 01/20/2023    ALBUMIN 4.1 01/20/2023    BUN 12 01/20/2023    CO2 24 01/20/2023    TSH 2.519 06/09/2021    PSA 0.97 01/20/2023    INR 1.1 03/09/2017    HGBA1C 7.8 (H) 01/20/2023    MICALBCREAT Unable to calculate 01/20/2023    LDLCALC 128.2 01/20/2023     (H) 01/20/2023       Hemoglobin A1C (%)   Date Value   01/20/2023 7.8 (H)   07/18/2022 7.1 (H)   04/06/2022 7.6 (H)   01/07/2022 6.9 (H)   09/21/2021 7.7 (H)   06/09/2021 7.6 (H)   05/08/2020 8.9 (H)   01/31/2020 8.4 (H)   11/04/2019 8.1 (H)   11/05/2018 8.4 (H)   12/05/2017 7.7 (H)       Vital signs reviewed  PE:   APPEARANCE: Well nourished, well developed, in no acute distress.    HEAD: Normocephalic, atraumatic.  NECK: Supple with no cervical lymphadenopathy.    CHEST: Good inspiratory effort. Lungs clear to auscultation with no wheezes or crackles.  CARDIOVASCULAR: Normal S1, S2. No rubs, murmurs, or gallops.  ABDOMEN: Bowel sounds normal. Not distended. Soft. No tenderness or masses. No organomegaly.  EXTREMITIES: No edema, cyanosis, or clubbing.  DIABETIC FOOT EXAM: Protective Sensation (w/ 10 gram monofilament):  Right: Intact  Left: Intact    Visual Inspection:  Normal -  Bilateral    Pedal Pulses:   Right: Present  Left: Present    Posterior tibialis:   Right:Present  Left: Present          IMPRESSION  Encounter Diagnoses   Name Primary?    Type 2 diabetes mellitus with other specified complication, without long-term current use of insulin Yes    Mixed hyperlipidemia     Ulcerative colitis  with complication, unspecified location     Coronary artery disease involving native coronary artery of native heart with angina pectoris              PLAN  Diabetes: Attributes to increase in carbohydrates recently.  Will continue area work on reducing carbohydrates in trying to get some regular exercise.  Check back in 3 months.  Will be Medicare plan at that time, willing to retry , can do Ozempic or Mounjaro if A1c is not sufficiently improved.    HLD: controlled.     CAD stable, no issues    HTN controlled       UC: no symptoms, no meds, c/scope utd below    Does report some urinary slowing at times.  Denies any pain or incontinence issues or increased urgency.  Discussed potential for BPH related symptoms alpha 1 antagonist therapy although he would like to hold off for now and continue to monitor    F/u 3 mo with labs prior  Orders Placed This Encounter   Procedures    Influenza - Quadrivalent *Preferred* (6 months+) (PF)    Comprehensive Metabolic Panel    Hemoglobin A1C    Lipid Panel    CBC Auto Differential       HEALTH SCREENINGS   Immunizations:  Pneumovax 2015  Tetanus around    zoster:  Up-to-date  Flu , today  COVID vaccine (J/J) 2021.  Pfizer booster on 2021, encourage booster vaccine.     Age/Gender Appropriate screenings:  Colonoscopy in  diffuse mildly altered vascular mucosa distal descending colon and rectum and sigmoid colon, 5 mm sigmoid polyp, repeat colonoscopy 2 years  Prostate screenin2023

## 2023-02-14 RX ORDER — GLIPIZIDE 10 MG/1
TABLET ORAL
Qty: 180 TABLET | Refills: 1 | Status: SHIPPED | OUTPATIENT
Start: 2023-02-14 | End: 2023-06-05 | Stop reason: SDUPTHER

## 2023-02-14 RX ORDER — METFORMIN HYDROCHLORIDE 500 MG/1
TABLET, EXTENDED RELEASE ORAL
Qty: 360 TABLET | Refills: 1 | Status: SHIPPED | OUTPATIENT
Start: 2023-02-14 | End: 2023-06-05 | Stop reason: SDUPTHER

## 2023-02-14 RX ORDER — EMPAGLIFLOZIN 25 MG/1
TABLET, FILM COATED ORAL
Qty: 90 TABLET | Refills: 1 | Status: SHIPPED | OUTPATIENT
Start: 2023-02-14 | End: 2023-06-05 | Stop reason: SDUPTHER

## 2023-02-14 NOTE — TELEPHONE ENCOUNTER
Refill Decision Note   Michoacano Lind  is requesting a refill authorization.  Brief Assessment and Rationale for Refill:  Approve     Medication Therapy Plan:       Medication Reconciliation Completed: No   Comments:     No Care Gaps recommended.     Note composed:5:06 PM 02/14/2023

## 2023-02-14 NOTE — TELEPHONE ENCOUNTER
No new care gaps identified.  [facility] Embedded Care Gaps. Reference number: [id number]. 2/14/2023   11:44:33 AM CST

## 2023-04-21 ENCOUNTER — LAB VISIT (OUTPATIENT)
Dept: LAB | Facility: HOSPITAL | Age: 63
End: 2023-04-21
Attending: FAMILY MEDICINE
Payer: MEDICARE

## 2023-04-21 DIAGNOSIS — E11.69 TYPE 2 DIABETES MELLITUS WITH OTHER SPECIFIED COMPLICATION, WITHOUT LONG-TERM CURRENT USE OF INSULIN: ICD-10-CM

## 2023-04-21 LAB
ALBUMIN SERPL BCP-MCNC: 4.1 G/DL (ref 3.5–5.2)
ALP SERPL-CCNC: 66 U/L (ref 55–135)
ALT SERPL W/O P-5'-P-CCNC: 21 U/L (ref 10–44)
ANION GAP SERPL CALC-SCNC: 10 MMOL/L (ref 8–16)
AST SERPL-CCNC: 15 U/L (ref 10–40)
BASOPHILS # BLD AUTO: 0.05 K/UL (ref 0–0.2)
BASOPHILS NFR BLD: 0.6 % (ref 0–1.9)
BILIRUB SERPL-MCNC: 0.5 MG/DL (ref 0.1–1)
BUN SERPL-MCNC: 12 MG/DL (ref 8–23)
CALCIUM SERPL-MCNC: 9.3 MG/DL (ref 8.7–10.5)
CHLORIDE SERPL-SCNC: 103 MMOL/L (ref 95–110)
CHOLEST SERPL-MCNC: 122 MG/DL (ref 120–199)
CHOLEST/HDLC SERPL: 3.2 {RATIO} (ref 2–5)
CO2 SERPL-SCNC: 25 MMOL/L (ref 23–29)
CREAT SERPL-MCNC: 0.7 MG/DL (ref 0.5–1.4)
DIFFERENTIAL METHOD: ABNORMAL
EOSINOPHIL # BLD AUTO: 0.2 K/UL (ref 0–0.5)
EOSINOPHIL NFR BLD: 2.3 % (ref 0–8)
ERYTHROCYTE [DISTWIDTH] IN BLOOD BY AUTOMATED COUNT: 12.2 % (ref 11.5–14.5)
EST. GFR  (NO RACE VARIABLE): >60 ML/MIN/1.73 M^2
ESTIMATED AVG GLUCOSE: 160 MG/DL (ref 68–131)
GLUCOSE SERPL-MCNC: 137 MG/DL (ref 70–110)
HBA1C MFR BLD: 7.2 % (ref 4–5.6)
HCT VFR BLD AUTO: 38.6 % (ref 40–54)
HDLC SERPL-MCNC: 38 MG/DL (ref 40–75)
HDLC SERPL: 31.1 % (ref 20–50)
HGB BLD-MCNC: 12.6 G/DL (ref 14–18)
IMM GRANULOCYTES # BLD AUTO: 0.04 K/UL (ref 0–0.04)
IMM GRANULOCYTES NFR BLD AUTO: 0.5 % (ref 0–0.5)
LDLC SERPL CALC-MCNC: 55 MG/DL (ref 63–159)
LYMPHOCYTES # BLD AUTO: 2.4 K/UL (ref 1–4.8)
LYMPHOCYTES NFR BLD: 26.8 % (ref 18–48)
MCH RBC QN AUTO: 27.9 PG (ref 27–31)
MCHC RBC AUTO-ENTMCNC: 32.6 G/DL (ref 32–36)
MCV RBC AUTO: 86 FL (ref 82–98)
MONOCYTES # BLD AUTO: 0.6 K/UL (ref 0.3–1)
MONOCYTES NFR BLD: 6.4 % (ref 4–15)
NEUTROPHILS # BLD AUTO: 5.6 K/UL (ref 1.8–7.7)
NEUTROPHILS NFR BLD: 63.4 % (ref 38–73)
NONHDLC SERPL-MCNC: 84 MG/DL
NRBC BLD-RTO: 0 /100 WBC
PLATELET # BLD AUTO: 266 K/UL (ref 150–450)
PMV BLD AUTO: 10.7 FL (ref 9.2–12.9)
POTASSIUM SERPL-SCNC: 4.6 MMOL/L (ref 3.5–5.1)
PROT SERPL-MCNC: 7.5 G/DL (ref 6–8.4)
RBC # BLD AUTO: 4.51 M/UL (ref 4.6–6.2)
SODIUM SERPL-SCNC: 138 MMOL/L (ref 136–145)
TRIGL SERPL-MCNC: 145 MG/DL (ref 30–150)
WBC # BLD AUTO: 8.81 K/UL (ref 3.9–12.7)

## 2023-04-21 PROCEDURE — 83036 HEMOGLOBIN GLYCOSYLATED A1C: CPT | Mod: HCNC | Performed by: FAMILY MEDICINE

## 2023-04-21 PROCEDURE — 80061 LIPID PANEL: CPT | Mod: HCNC | Performed by: FAMILY MEDICINE

## 2023-04-21 PROCEDURE — 36415 COLL VENOUS BLD VENIPUNCTURE: CPT | Mod: HCNC,PN | Performed by: FAMILY MEDICINE

## 2023-04-21 PROCEDURE — 80053 COMPREHEN METABOLIC PANEL: CPT | Mod: HCNC | Performed by: FAMILY MEDICINE

## 2023-04-21 PROCEDURE — 85025 COMPLETE CBC W/AUTO DIFF WBC: CPT | Mod: HCNC | Performed by: FAMILY MEDICINE

## 2023-05-05 ENCOUNTER — PATIENT MESSAGE (OUTPATIENT)
Dept: FAMILY MEDICINE | Facility: CLINIC | Age: 63
End: 2023-05-05
Payer: MEDICARE

## 2023-05-11 ENCOUNTER — PATIENT MESSAGE (OUTPATIENT)
Dept: FAMILY MEDICINE | Facility: CLINIC | Age: 63
End: 2023-05-11
Payer: MEDICARE

## 2023-05-15 DIAGNOSIS — I25.119 CORONARY ARTERY DISEASE INVOLVING NATIVE CORONARY ARTERY OF NATIVE HEART WITH ANGINA PECTORIS: ICD-10-CM

## 2023-05-15 RX ORDER — DICLOFENAC SODIUM 10 MG/G
GEL TOPICAL
Qty: 692 G | Refills: 0 | OUTPATIENT
Start: 2023-05-15

## 2023-05-15 RX ORDER — CLOPIDOGREL BISULFATE 75 MG/1
TABLET ORAL
Qty: 90 TABLET | Refills: 0 | OUTPATIENT
Start: 2023-05-15

## 2023-05-15 RX ORDER — LOSARTAN POTASSIUM 100 MG/1
TABLET ORAL
Qty: 90 TABLET | Refills: 0 | OUTPATIENT
Start: 2023-05-15

## 2023-05-15 RX ORDER — METOPROLOL SUCCINATE 50 MG/1
TABLET, EXTENDED RELEASE ORAL
Qty: 90 TABLET | Refills: 0 | OUTPATIENT
Start: 2023-05-15

## 2023-05-15 RX ORDER — METFORMIN HYDROCHLORIDE 500 MG/1
TABLET, EXTENDED RELEASE ORAL
Qty: 180 TABLET | Refills: 0 | OUTPATIENT
Start: 2023-05-15

## 2023-05-15 RX ORDER — AMLODIPINE BESYLATE 5 MG/1
TABLET ORAL
Qty: 90 TABLET | Refills: 0 | OUTPATIENT
Start: 2023-05-15

## 2023-05-15 RX ORDER — GLIPIZIDE 10 MG/1
TABLET ORAL
Qty: 180 TABLET | Refills: 0 | OUTPATIENT
Start: 2023-05-15

## 2023-05-15 NOTE — TELEPHONE ENCOUNTER
Refill Decision Note   Michoacano Lind  is requesting a refill authorization.  Brief Assessment and Rationale for Refill:  Route     Medication Therapy Plan: Pharmacy is requesting new scripts for the following medications without required information, (sig/ frequency/qty/etc)     Medication Reconciliation Completed: No   Comments:     No Care Gaps recommended.     Note composed:12:12 PM 05/15/2023

## 2023-05-15 NOTE — TELEPHONE ENCOUNTER
No care due was identified.  [facility] Embedded Care Due Messages. Reference number: [id number].   5/15/2023 10:31:48 AM CDT

## 2023-05-30 DIAGNOSIS — I25.119 CORONARY ARTERY DISEASE INVOLVING NATIVE CORONARY ARTERY OF NATIVE HEART WITH ANGINA PECTORIS: ICD-10-CM

## 2023-05-30 RX ORDER — CLOPIDOGREL BISULFATE 75 MG/1
75 TABLET ORAL DAILY
Qty: 90 TABLET | Refills: 3 | Status: SHIPPED | OUTPATIENT
Start: 2023-05-30

## 2023-05-31 RX ORDER — DICLOFENAC SODIUM 10 MG/G
GEL TOPICAL
Qty: 100 G | Refills: 0 | Status: SHIPPED | OUTPATIENT
Start: 2023-05-31 | End: 2023-06-05 | Stop reason: SDUPTHER

## 2023-06-05 ENCOUNTER — OFFICE VISIT (OUTPATIENT)
Dept: FAMILY MEDICINE | Facility: CLINIC | Age: 63
End: 2023-06-05
Payer: MEDICARE

## 2023-06-05 VITALS
BODY MASS INDEX: 24.41 KG/M2 | SYSTOLIC BLOOD PRESSURE: 112 MMHG | TEMPERATURE: 98 F | DIASTOLIC BLOOD PRESSURE: 60 MMHG | OXYGEN SATURATION: 99 % | HEIGHT: 66 IN | WEIGHT: 151.88 LBS | HEART RATE: 71 BPM

## 2023-06-05 DIAGNOSIS — E11.69 TYPE 2 DIABETES MELLITUS WITH OTHER SPECIFIED COMPLICATION, WITHOUT LONG-TERM CURRENT USE OF INSULIN: Primary | ICD-10-CM

## 2023-06-05 DIAGNOSIS — E78.2 MIXED HYPERLIPIDEMIA: ICD-10-CM

## 2023-06-05 PROCEDURE — 1100F PTFALLS ASSESS-DOCD GE2>/YR: CPT | Mod: CPTII,S$GLB,, | Performed by: FAMILY MEDICINE

## 2023-06-05 PROCEDURE — 3288F PR FALLS RISK ASSESSMENT DOCUMENTED: ICD-10-PCS | Mod: CPTII,S$GLB,, | Performed by: FAMILY MEDICINE

## 2023-06-05 PROCEDURE — 99999 PR PBB SHADOW E&M-EST. PATIENT-LVL III: ICD-10-PCS | Mod: PBBFAC,,, | Performed by: FAMILY MEDICINE

## 2023-06-05 PROCEDURE — 3051F HG A1C>EQUAL 7.0%<8.0%: CPT | Mod: CPTII,S$GLB,, | Performed by: FAMILY MEDICINE

## 2023-06-05 PROCEDURE — 3066F PR DOCUMENTATION OF TREATMENT FOR NEPHROPATHY: ICD-10-PCS | Mod: CPTII,S$GLB,, | Performed by: FAMILY MEDICINE

## 2023-06-05 PROCEDURE — 1100F PR PT FALLS ASSESS DOC 2+ FALLS/FALL W/INJURY/YR: ICD-10-PCS | Mod: CPTII,S$GLB,, | Performed by: FAMILY MEDICINE

## 2023-06-05 PROCEDURE — 3074F SYST BP LT 130 MM HG: CPT | Mod: CPTII,S$GLB,, | Performed by: FAMILY MEDICINE

## 2023-06-05 PROCEDURE — 3008F BODY MASS INDEX DOCD: CPT | Mod: CPTII,S$GLB,, | Performed by: FAMILY MEDICINE

## 2023-06-05 PROCEDURE — 3066F NEPHROPATHY DOC TX: CPT | Mod: CPTII,S$GLB,, | Performed by: FAMILY MEDICINE

## 2023-06-05 PROCEDURE — 1159F PR MEDICATION LIST DOCUMENTED IN MEDICAL RECORD: ICD-10-PCS | Mod: CPTII,S$GLB,, | Performed by: FAMILY MEDICINE

## 2023-06-05 PROCEDURE — 3008F PR BODY MASS INDEX (BMI) DOCUMENTED: ICD-10-PCS | Mod: CPTII,S$GLB,, | Performed by: FAMILY MEDICINE

## 2023-06-05 PROCEDURE — 3078F PR MOST RECENT DIASTOLIC BLOOD PRESSURE < 80 MM HG: ICD-10-PCS | Mod: CPTII,S$GLB,, | Performed by: FAMILY MEDICINE

## 2023-06-05 PROCEDURE — 3061F PR NEG MICROALBUMINURIA RESULT DOCUMENTED/REVIEW: ICD-10-PCS | Mod: CPTII,S$GLB,, | Performed by: FAMILY MEDICINE

## 2023-06-05 PROCEDURE — 1159F MED LIST DOCD IN RCRD: CPT | Mod: CPTII,S$GLB,, | Performed by: FAMILY MEDICINE

## 2023-06-05 PROCEDURE — 3051F PR MOST RECENT HEMOGLOBIN A1C LEVEL 7.0 - < 8.0%: ICD-10-PCS | Mod: CPTII,S$GLB,, | Performed by: FAMILY MEDICINE

## 2023-06-05 PROCEDURE — 4010F PR ACE/ARB THEARPY RXD/TAKEN: ICD-10-PCS | Mod: CPTII,S$GLB,, | Performed by: FAMILY MEDICINE

## 2023-06-05 PROCEDURE — 3078F DIAST BP <80 MM HG: CPT | Mod: CPTII,S$GLB,, | Performed by: FAMILY MEDICINE

## 2023-06-05 PROCEDURE — 3074F PR MOST RECENT SYSTOLIC BLOOD PRESSURE < 130 MM HG: ICD-10-PCS | Mod: CPTII,S$GLB,, | Performed by: FAMILY MEDICINE

## 2023-06-05 PROCEDURE — 3061F NEG MICROALBUMINURIA REV: CPT | Mod: CPTII,S$GLB,, | Performed by: FAMILY MEDICINE

## 2023-06-05 PROCEDURE — 4010F ACE/ARB THERAPY RXD/TAKEN: CPT | Mod: CPTII,S$GLB,, | Performed by: FAMILY MEDICINE

## 2023-06-05 PROCEDURE — 99999 PR PBB SHADOW E&M-EST. PATIENT-LVL III: CPT | Mod: PBBFAC,,, | Performed by: FAMILY MEDICINE

## 2023-06-05 PROCEDURE — 99214 PR OFFICE/OUTPT VISIT, EST, LEVL IV, 30-39 MIN: ICD-10-PCS | Mod: S$GLB,,, | Performed by: FAMILY MEDICINE

## 2023-06-05 PROCEDURE — 3288F FALL RISK ASSESSMENT DOCD: CPT | Mod: CPTII,S$GLB,, | Performed by: FAMILY MEDICINE

## 2023-06-05 PROCEDURE — 99214 OFFICE O/P EST MOD 30 MIN: CPT | Mod: S$GLB,,, | Performed by: FAMILY MEDICINE

## 2023-06-05 RX ORDER — DICLOFENAC SODIUM 10 MG/G
GEL TOPICAL
Qty: 100 G | Refills: 4 | Status: SHIPPED | OUTPATIENT
Start: 2023-06-05

## 2023-06-05 RX ORDER — METFORMIN HYDROCHLORIDE 500 MG/1
TABLET, EXTENDED RELEASE ORAL
Qty: 360 TABLET | Refills: 1 | Status: SHIPPED | OUTPATIENT
Start: 2023-06-05 | End: 2024-02-14

## 2023-06-05 RX ORDER — METOPROLOL SUCCINATE 50 MG/1
50 TABLET, EXTENDED RELEASE ORAL DAILY
Qty: 90 TABLET | Refills: 3 | Status: SHIPPED | OUTPATIENT
Start: 2023-06-05

## 2023-06-05 RX ORDER — GLIPIZIDE 10 MG/1
TABLET ORAL
Qty: 180 TABLET | Refills: 3 | Status: SHIPPED | OUTPATIENT
Start: 2023-06-05

## 2023-06-05 RX ORDER — LOSARTAN POTASSIUM 100 MG/1
TABLET ORAL
Qty: 90 TABLET | Refills: 3 | Status: SHIPPED | OUTPATIENT
Start: 2023-06-05

## 2023-06-05 RX ORDER — ROSUVASTATIN CALCIUM 40 MG/1
40 TABLET, COATED ORAL DAILY
Qty: 90 TABLET | Refills: 3 | Status: SHIPPED | OUTPATIENT
Start: 2023-06-05

## 2023-06-05 NOTE — PATIENT INSTRUCTIONS
Check with your pharmacy regarding getting the tetanus (Tdap) vaccine (once every 10 years)    Covid booster: updated omicron booster effective Sept 2022:  [facility] users can check availability and schedule their vaccinations via [facility]. If you don't have a [facility] account, you can sign up at [email or web address], call [phone] or visit [email or web address] for available locations and times

## 2023-06-05 NOTE — PROGRESS NOTES
(Portions of this note were dictated using voice recognition software and may contain dictation related errors in spelling/grammar/syntax not found on text review)         Chief Complaint   Patient presents with    Follow-up       HPI: 63 y.o. male       Diabetes, on metformin  2 pills am/2 pill pm.  glipizide 10 mg twice a day, Jardiance 25 mg daily.  Tried to switch him to weekly G LP 1 agonist with Trulicity but apparently this was not covered and recommendation was to try bydureon, Victoza, or Byetta 1st.  Tried later by to do Bydureon weekly but apparently had some local allergic reaction with injections.  Had advised on potential of Victoza although he did not seem interested in doing a daily injection.  A1c had increased to 7.8 last time, discussed potential different G LP 1 agonist although had attributed some of his increased to increasing carbohydrate intake.  Was interested in dietary modification 1st.  Most recent A1c is 7.2.    Does not take aspirin because of ALLERGY.    eye exam :  Up-to-date (Dr. Rapp)  Sees a dentist regularly.    No neuropathy symptoms     CAD:  Presentation march 2017 with angina. CAD on angio, no revasc ability. Medically managed with statin (was on Lipitor 80 but given suboptimal LDL switched over to Crestor 40), plavix, metoprolol suc 25 daily, losartan 100 mg daily, imdur 30 mg daily.  Had a nuclear stress test done last in December 2, 2020. Normal perfusion scan.  EF normal at rest and with stress.  Last cardiology visit was on 11/05/2020     Hypertension on losartan 100 mg daily,metoprolol 50 mg daily,Amlodipine 5 mg daily .        Ulcerative colitis, prior followed by GI. Currently not taking mesalamine (asymptomatic at this time)    Omeprazole PRN for GERD sx    Chronic left knee pain, no recent injury.  No swelling of the knee.  Does get stiff sometimes.  X-ray in June showed calcification along MCL suggesting ligamentous injury.  Medial compartment narrowing   left greater than right, moderate left-sided suprapatellar joint effusion and superior and inferior patellar articular surface spurring.  Had given topical diclofenac gel, had discussed potential physical therapy.  Had consulted with Orthopedics  .  Did recently fall and landed on his left knee again.  Went to Orthopedics, was given steroid shot which helped.  However has told that he has severe osteoarthritis and will ultimately need a knee replacement.  However doing okay so far.  Would like a refill of his diclofenac    Fell and fractured his distal right radius had ORIF done at [facility] in April 2022. Doing OT, overall improving    Mild stable anemia, has had normal B12 testing in the past, had low iron at 1 time.  Is vegetarian but not vegan    Past Medical History:   Diagnosis Date    Coronary artery disease involving native coronary artery of native heart without angina pectoris 7/29/2017    Diabetes mellitus, type II     HTN (hypertension)     Mixed hyperlipidemia 10/24/2019    Ulcerative colitis        Past Surgical History:   Procedure Laterality Date    CATARACT EXTRACTION Bilateral 2013    COLONOSCOPY N/A 1/22/2016    Procedure: COLONOSCOPY;  Surgeon: Aristeo Lynn Jr., MD;  Location: [facility] ENDO;  Service: Endoscopy;  Laterality: N/A;    COLONOSCOPY N/A 10/13/2020    Procedure: COLONOSCOPY;  Surgeon: Charly Travis MD;  Location: [facility];  Service: Endoscopy;  Laterality: N/A;    COLONOSCOPY N/A 5/3/2021    Procedure: COLONOSCOPY;  Surgeon: Charly Travis MD;  Location: [facility];  Service: Endoscopy;  Laterality: N/A;       Family History   Problem Relation Age of Onset    Coronary artery disease Mother 75    Coronary artery disease Father 55    Coronary artery disease Brother 59    Diabetes Brother     Prostate cancer Neg Hx     Colon cancer Neg Hx        Social History     Tobacco Use    Smoking status: Former     Types: Cigarettes     Quit date: 3/16/2008     Years since quitting: 15.2     Smokeless tobacco: Former     Quit date: 11/5/2009   Substance Use Topics    Alcohol use: Not Currently     Alcohol/week: 2.0 standard drinks     Types: 1 Shots of liquor, 1 Glasses of wine per week     Comment: occasionally    Drug use: No       Lab Results   Component Value Date    WBC 8.81 04/21/2023    HGB 12.6 (L) 04/21/2023    HCT 38.6 (L) 04/21/2023    MCV 86 04/21/2023     04/21/2023    CHOL 122 04/21/2023    TRIG 145 04/21/2023    HDL 38 (L) 04/21/2023    ALT 21 04/21/2023    AST 15 04/21/2023    BILITOT 0.5 04/21/2023    ALKPHOS 66 04/21/2023     04/21/2023    K 4.6 04/21/2023     04/21/2023    CREATININE 0.7 04/21/2023    ESTGFRAFRICA >60.0 07/18/2022    EGFRNONAA >60.0 07/18/2022    CALCIUM 9.3 04/21/2023    ALBUMIN 4.1 04/21/2023    BUN 12 04/21/2023    CO2 25 04/21/2023    TSH 2.519 06/09/2021    PSA 0.97 01/20/2023    INR 1.1 03/09/2017    HGBA1C 7.2 (H) 04/21/2023    MICALBCREAT Unable to calculate 01/20/2023    LDLCALC 55.0 (L) 04/21/2023     (H) 04/21/2023       Hemoglobin A1C (%)   Date Value   04/21/2023 7.2 (H)   01/20/2023 7.8 (H)   07/18/2022 7.1 (H)   04/06/2022 7.6 (H)   01/07/2022 6.9 (H)   09/21/2021 7.7 (H)   06/09/2021 7.6 (H)   05/08/2020 8.9 (H)   01/31/2020 8.4 (H)   11/04/2019 8.1 (H)   11/05/2018 8.4 (H)       Vital signs reviewed  PE:   APPEARANCE: Well nourished, well developed, in no acute distress.    HEAD: Normocephalic, atraumatic.  NECK: Supple with no cervical lymphadenopathy.    CHEST: Good inspiratory effort. Lungs clear to auscultation with no wheezes or crackles.  CARDIOVASCULAR: Normal S1, S2. No rubs, murmurs, or gallops.  ABDOMEN: Bowel sounds normal. Not distended. Soft. No tenderness or masses. No organomegaly.  EXTREMITIES: No edema, cyanosis, or clubbing.  DIABETIC FOOT EXAM:  January 2023        IMPRESSION  Encounter Diagnoses   Name Primary?    Type 2 diabetes mellitus with other specified complication, without long-term current use of  insulin Yes    Mixed hyperlipidemia                PLAN  Diabetes:  Continue current meds, update labs  in 3 months.  , can do Ozempic or Mounjaro if A1c is not sufficiently improved.    HLD: controlled.     CAD stable, no issues    HTN controlled       UC: no symptoms, no meds, c/scope utd below    Continue orthopedic follow-up for his knee arthritis    Meds below refilled  Medications Ordered This Encounter   Medications    diclofenac sodium (VOLTAREN) 1 % Gel     Sig: APPLY 2 GRAMS TOPICALLY 4 TIMES DAILY AS NEEDED FOR PAIN     Dispense:  100 g     Refill:  4    empagliflozin (JARDIANCE) 25 mg tablet     Sig: Take 1 tablet (25 mg total) by mouth once daily.     Dispense:  90 tablet     Refill:  3    glipiZIDE (GLUCOTROL) 10 MG tablet     Sig: TAKE ONE TABLET BY MOUTH TWO TIMES A DAY BEFORE MEALS     Dispense:  180 tablet     Refill:  3    losartan (COZAAR) 100 MG tablet     Sig: TAKE 1 TABLET (100MG) BY MOUTH ONCE DAILY     Dispense:  90 tablet     Refill:  3     .    metFORMIN (GLUCOPHAGE-XR) 500 MG ER 24hr tablet     Sig: TAKE 2 TABLETS BY MOUTH TWO TIMES A DAY WITH MEALS     Dispense:  360 tablet     Refill:  1    metoprolol succinate (TOPROL-XL) 50 MG 24 hr tablet     Sig: Take 1 tablet (50 mg total) by mouth once daily.     Dispense:  90 tablet     Refill:  3     .    rosuvastatin (CRESTOR) 40 MG Tab     Sig: Take 1 tablet (40 mg total) by mouth once daily.     Dispense:  90 tablet     Refill:  3         F/u 3 mo with labs prior  Orders Placed This Encounter   Procedures    Comprehensive Metabolic Panel    Lipid Panel    Hemoglobin A1C       HEALTH SCREENINGS   Immunizations:  Pneumovax 2015  Tetanus around 2013, can get at pharmacy  zoster:  Up-to-date  The COVID vaccine (J/J) 03/08/2021.  Pfizer booster on 12/07/2021, encourage booster vaccine.     Age/Gender Appropriate screenings:  Colonoscopy in 2021 diffuse mildly altered vascular mucosa distal descending colon and rectum and sigmoid colon, 5 mm  sigmoid polyp, repeat colonoscopy 2 years  Prostate screenin2023

## 2023-06-16 ENCOUNTER — PATIENT MESSAGE (OUTPATIENT)
Dept: PODIATRY | Facility: CLINIC | Age: 63
End: 2023-06-16
Payer: MEDICARE

## 2023-08-28 RX ORDER — AMLODIPINE BESYLATE 5 MG/1
TABLET ORAL
Qty: 90 TABLET | Refills: 3 | Status: SHIPPED | OUTPATIENT
Start: 2023-08-28

## 2023-08-28 NOTE — TELEPHONE ENCOUNTER
Care Due:                  Date            Visit Type   Department     Provider  --------------------------------------------------------------------------------                                EP -                              [facility]  Last Visit: 06-      CARE ([facility])   [facility]       Facundokiya Alexis                              [facility]  Next Visit: 09-      CARE ([facility])   [facility]                                                            Last  Test          Frequency    Reason                     Performed    Due Date  --------------------------------------------------------------------------------    HBA1C.......  6 months...  empagliflozin, glipiZIDE,   04-   10-                             metFORMIN................    [employer] Embedded Care Due Messages. Reference number: [id number].   8/28/2023 11:43:52 AM CDT

## 2023-09-05 DIAGNOSIS — E11.69 TYPE 2 DIABETES MELLITUS WITH OTHER SPECIFIED COMPLICATION, WITHOUT LONG-TERM CURRENT USE OF INSULIN: Primary | ICD-10-CM

## 2023-09-05 RX ORDER — BLOOD SUGAR DIAGNOSTIC
STRIP MISCELLANEOUS
Qty: 200 STRIP | Refills: 3 | Status: SHIPPED | OUTPATIENT
Start: 2023-09-05 | End: 2024-02-14

## 2023-09-05 NOTE — TELEPHONE ENCOUNTER
No care due was identified.  [facility] Embedded Care Due Messages. Reference number: [id number].   9/05/2023 11:38:25 AM CDT

## 2023-09-05 NOTE — TELEPHONE ENCOUNTER
Refill Decision Note   Michoacano Lelo  is requesting a refill authorization.  Brief Assessment and Rationale for Refill:  Approve     Medication Therapy Plan:         Comments:     Note composed:4:24 PM 09/05/2023

## 2023-09-07 ENCOUNTER — LAB VISIT (OUTPATIENT)
Dept: LAB | Facility: HOSPITAL | Age: 63
End: 2023-09-07
Attending: FAMILY MEDICINE
Payer: MEDICARE

## 2023-09-07 DIAGNOSIS — E11.69 TYPE 2 DIABETES MELLITUS WITH OTHER SPECIFIED COMPLICATION, WITHOUT LONG-TERM CURRENT USE OF INSULIN: ICD-10-CM

## 2023-09-07 LAB
ALBUMIN SERPL BCP-MCNC: 4.2 G/DL (ref 3.5–5.2)
ALP SERPL-CCNC: 66 U/L (ref 55–135)
ALT SERPL W/O P-5'-P-CCNC: 22 U/L (ref 10–44)
ANION GAP SERPL CALC-SCNC: 14 MMOL/L (ref 8–16)
AST SERPL-CCNC: 18 U/L (ref 10–40)
BILIRUB SERPL-MCNC: 0.6 MG/DL (ref 0.1–1)
BUN SERPL-MCNC: 13 MG/DL (ref 8–23)
CALCIUM SERPL-MCNC: 9.3 MG/DL (ref 8.7–10.5)
CHLORIDE SERPL-SCNC: 101 MMOL/L (ref 95–110)
CHOLEST SERPL-MCNC: 145 MG/DL (ref 120–199)
CHOLEST/HDLC SERPL: 3.7 {RATIO} (ref 2–5)
CO2 SERPL-SCNC: 24 MMOL/L (ref 23–29)
CREAT SERPL-MCNC: 0.8 MG/DL (ref 0.5–1.4)
EST. GFR  (NO RACE VARIABLE): >60 ML/MIN/1.73 M^2
ESTIMATED AVG GLUCOSE: 166 MG/DL (ref 68–131)
GLUCOSE SERPL-MCNC: 162 MG/DL (ref 70–110)
HBA1C MFR BLD: 7.4 % (ref 4–5.6)
HDLC SERPL-MCNC: 39 MG/DL (ref 40–75)
HDLC SERPL: 26.9 % (ref 20–50)
LDLC SERPL CALC-MCNC: 73.8 MG/DL (ref 63–159)
NONHDLC SERPL-MCNC: 106 MG/DL
POTASSIUM SERPL-SCNC: 4.4 MMOL/L (ref 3.5–5.1)
PROT SERPL-MCNC: 7.5 G/DL (ref 6–8.4)
SODIUM SERPL-SCNC: 139 MMOL/L (ref 136–145)
TRIGL SERPL-MCNC: 161 MG/DL (ref 30–150)

## 2023-09-07 PROCEDURE — 80061 LIPID PANEL: CPT | Mod: HCNC | Performed by: FAMILY MEDICINE

## 2023-09-07 PROCEDURE — 80053 COMPREHEN METABOLIC PANEL: CPT | Mod: HCNC | Performed by: FAMILY MEDICINE

## 2023-09-07 PROCEDURE — 36415 COLL VENOUS BLD VENIPUNCTURE: CPT | Mod: HCNC,PN | Performed by: FAMILY MEDICINE

## 2023-09-07 PROCEDURE — 83036 HEMOGLOBIN GLYCOSYLATED A1C: CPT | Mod: HCNC | Performed by: FAMILY MEDICINE

## 2023-09-11 ENCOUNTER — PATIENT MESSAGE (OUTPATIENT)
Dept: FAMILY MEDICINE | Facility: CLINIC | Age: 63
End: 2023-09-11
Payer: MEDICARE

## 2023-09-12 ENCOUNTER — OFFICE VISIT (OUTPATIENT)
Dept: FAMILY MEDICINE | Facility: CLINIC | Age: 63
End: 2023-09-12
Payer: MEDICARE

## 2023-09-12 VITALS
SYSTOLIC BLOOD PRESSURE: 118 MMHG | OXYGEN SATURATION: 99 % | RESPIRATION RATE: 18 BRPM | WEIGHT: 156.75 LBS | HEART RATE: 67 BPM | BODY MASS INDEX: 25.19 KG/M2 | HEIGHT: 66 IN | DIASTOLIC BLOOD PRESSURE: 68 MMHG

## 2023-09-12 DIAGNOSIS — E11.69 TYPE 2 DIABETES MELLITUS WITH OTHER SPECIFIED COMPLICATION, WITHOUT LONG-TERM CURRENT USE OF INSULIN: Primary | ICD-10-CM

## 2023-09-12 DIAGNOSIS — Z13.6 ENCOUNTER FOR ABDOMINAL AORTIC ANEURYSM (AAA) SCREENING: ICD-10-CM

## 2023-09-12 DIAGNOSIS — I25.119 CORONARY ARTERY DISEASE INVOLVING NATIVE CORONARY ARTERY OF NATIVE HEART WITH ANGINA PECTORIS: ICD-10-CM

## 2023-09-12 DIAGNOSIS — K21.9 GASTROESOPHAGEAL REFLUX DISEASE, UNSPECIFIED WHETHER ESOPHAGITIS PRESENT: ICD-10-CM

## 2023-09-12 PROCEDURE — 90694 VACC AIIV4 NO PRSRV 0.5ML IM: CPT | Mod: HCNC,S$GLB,, | Performed by: FAMILY MEDICINE

## 2023-09-12 PROCEDURE — 99999 PR PBB SHADOW E&M-EST. PATIENT-LVL V: ICD-10-PCS | Mod: PBBFAC,HCNC,, | Performed by: FAMILY MEDICINE

## 2023-09-12 PROCEDURE — 99214 PR OFFICE/OUTPT VISIT, EST, LEVL IV, 30-39 MIN: ICD-10-PCS | Mod: 25,HCNC,S$GLB, | Performed by: FAMILY MEDICINE

## 2023-09-12 PROCEDURE — G0008 ADMIN INFLUENZA VIRUS VAC: HCPCS | Mod: HCNC,S$GLB,, | Performed by: FAMILY MEDICINE

## 2023-09-12 PROCEDURE — 3066F NEPHROPATHY DOC TX: CPT | Mod: HCNC,CPTII,S$GLB, | Performed by: FAMILY MEDICINE

## 2023-09-12 PROCEDURE — 3078F PR MOST RECENT DIASTOLIC BLOOD PRESSURE < 80 MM HG: ICD-10-PCS | Mod: HCNC,CPTII,S$GLB, | Performed by: FAMILY MEDICINE

## 2023-09-12 PROCEDURE — 3061F NEG MICROALBUMINURIA REV: CPT | Mod: HCNC,CPTII,S$GLB, | Performed by: FAMILY MEDICINE

## 2023-09-12 PROCEDURE — 3051F HG A1C>EQUAL 7.0%<8.0%: CPT | Mod: HCNC,CPTII,S$GLB, | Performed by: FAMILY MEDICINE

## 2023-09-12 PROCEDURE — G0009 PNEUMOCOCCAL CONJUGATE VACCINE 20-VALENT: ICD-10-PCS | Mod: HCNC,S$GLB,, | Performed by: FAMILY MEDICINE

## 2023-09-12 PROCEDURE — 3078F DIAST BP <80 MM HG: CPT | Mod: HCNC,CPTII,S$GLB, | Performed by: FAMILY MEDICINE

## 2023-09-12 PROCEDURE — 99214 OFFICE O/P EST MOD 30 MIN: CPT | Mod: 25,HCNC,S$GLB, | Performed by: FAMILY MEDICINE

## 2023-09-12 PROCEDURE — 1159F PR MEDICATION LIST DOCUMENTED IN MEDICAL RECORD: ICD-10-PCS | Mod: HCNC,CPTII,S$GLB, | Performed by: FAMILY MEDICINE

## 2023-09-12 PROCEDURE — 90677 PNEUMOCOCCAL CONJUGATE VACCINE 20-VALENT: ICD-10-PCS | Mod: HCNC,S$GLB,, | Performed by: FAMILY MEDICINE

## 2023-09-12 PROCEDURE — 4010F ACE/ARB THERAPY RXD/TAKEN: CPT | Mod: HCNC,CPTII,S$GLB, | Performed by: FAMILY MEDICINE

## 2023-09-12 PROCEDURE — 3008F BODY MASS INDEX DOCD: CPT | Mod: HCNC,CPTII,S$GLB, | Performed by: FAMILY MEDICINE

## 2023-09-12 PROCEDURE — 90677 PCV20 VACCINE IM: CPT | Mod: HCNC,S$GLB,, | Performed by: FAMILY MEDICINE

## 2023-09-12 PROCEDURE — 1101F PR PT FALLS ASSESS DOC 0-1 FALLS W/OUT INJ PAST YR: ICD-10-PCS | Mod: HCNC,CPTII,S$GLB, | Performed by: FAMILY MEDICINE

## 2023-09-12 PROCEDURE — 4010F PR ACE/ARB THEARPY RXD/TAKEN: ICD-10-PCS | Mod: HCNC,CPTII,S$GLB, | Performed by: FAMILY MEDICINE

## 2023-09-12 PROCEDURE — 3061F PR NEG MICROALBUMINURIA RESULT DOCUMENTED/REVIEW: ICD-10-PCS | Mod: HCNC,CPTII,S$GLB, | Performed by: FAMILY MEDICINE

## 2023-09-12 PROCEDURE — 3074F PR MOST RECENT SYSTOLIC BLOOD PRESSURE < 130 MM HG: ICD-10-PCS | Mod: HCNC,CPTII,S$GLB, | Performed by: FAMILY MEDICINE

## 2023-09-12 PROCEDURE — 99999 PR PBB SHADOW E&M-EST. PATIENT-LVL V: CPT | Mod: PBBFAC,HCNC,, | Performed by: FAMILY MEDICINE

## 2023-09-12 PROCEDURE — 1101F PT FALLS ASSESS-DOCD LE1/YR: CPT | Mod: HCNC,CPTII,S$GLB, | Performed by: FAMILY MEDICINE

## 2023-09-12 PROCEDURE — 90694 FLU VACCINE - QUADRIVALENT - ADJUVANTED: ICD-10-PCS | Mod: HCNC,S$GLB,, | Performed by: FAMILY MEDICINE

## 2023-09-12 PROCEDURE — G0009 ADMIN PNEUMOCOCCAL VACCINE: HCPCS | Mod: HCNC,S$GLB,, | Performed by: FAMILY MEDICINE

## 2023-09-12 PROCEDURE — 1160F PR REVIEW ALL MEDS BY PRESCRIBER/CLIN PHARMACIST DOCUMENTED: ICD-10-PCS | Mod: HCNC,CPTII,S$GLB, | Performed by: FAMILY MEDICINE

## 2023-09-12 PROCEDURE — G0008 FLU VACCINE - QUADRIVALENT - ADJUVANTED: ICD-10-PCS | Mod: HCNC,S$GLB,, | Performed by: FAMILY MEDICINE

## 2023-09-12 PROCEDURE — 3288F FALL RISK ASSESSMENT DOCD: CPT | Mod: HCNC,CPTII,S$GLB, | Performed by: FAMILY MEDICINE

## 2023-09-12 PROCEDURE — 3074F SYST BP LT 130 MM HG: CPT | Mod: HCNC,CPTII,S$GLB, | Performed by: FAMILY MEDICINE

## 2023-09-12 PROCEDURE — 3066F PR DOCUMENTATION OF TREATMENT FOR NEPHROPATHY: ICD-10-PCS | Mod: HCNC,CPTII,S$GLB, | Performed by: FAMILY MEDICINE

## 2023-09-12 PROCEDURE — 1159F MED LIST DOCD IN RCRD: CPT | Mod: HCNC,CPTII,S$GLB, | Performed by: FAMILY MEDICINE

## 2023-09-12 PROCEDURE — 3288F PR FALLS RISK ASSESSMENT DOCUMENTED: ICD-10-PCS | Mod: HCNC,CPTII,S$GLB, | Performed by: FAMILY MEDICINE

## 2023-09-12 PROCEDURE — 1160F RVW MEDS BY RX/DR IN RCRD: CPT | Mod: HCNC,CPTII,S$GLB, | Performed by: FAMILY MEDICINE

## 2023-09-12 PROCEDURE — 3008F PR BODY MASS INDEX (BMI) DOCUMENTED: ICD-10-PCS | Mod: HCNC,CPTII,S$GLB, | Performed by: FAMILY MEDICINE

## 2023-09-12 PROCEDURE — 3051F PR MOST RECENT HEMOGLOBIN A1C LEVEL 7.0 - < 8.0%: ICD-10-PCS | Mod: HCNC,CPTII,S$GLB, | Performed by: FAMILY MEDICINE

## 2023-09-12 RX ORDER — FAMOTIDINE 20 MG/1
20 TABLET, FILM COATED ORAL 2 TIMES DAILY PRN
Qty: 60 TABLET | Refills: 11 | Status: SHIPPED | OUTPATIENT
Start: 2023-09-12 | End: 2024-09-11

## 2023-09-12 RX ORDER — TIRZEPATIDE 2.5 MG/.5ML
2.5 INJECTION, SOLUTION SUBCUTANEOUS
Qty: 4 PEN | Refills: 11 | Status: SHIPPED | OUTPATIENT
Start: 2023-09-12 | End: 2024-01-12

## 2023-09-12 NOTE — PATIENT INSTRUCTIONS
Check with your pharmacy regarding getting the tetanus (Tdap) vaccine (once every 10 years)    Covid booster: updated omicron booster effective Sept 2022:  [facility] users can check availability and schedule their vaccinations via [facility]. If you don't have a [facility] account, you can sign up at [email or web address], call [phone] or visit [email or web address] for available locations and times

## 2023-09-12 NOTE — PROGRESS NOTES
(Portions of this note were dictated using voice recognition software and may contain dictation related errors in spelling/grammar/syntax not found on text review)         Chief Complaint   Patient presents with    Follow-up       HPI: 63 y.o. male       Diabetes, on metformin  2 pills am/2 pill pm.  glipizide 10 mg twice a day, Jardiance 25 mg daily.  Tried to switch him to weekly G LP 1 agonist with Trulicity but apparently this was not covered and recommendation was to try bydureon, Victoza, or Byetta 1st.  Tried later by to do Bydureon weekly but apparently had some local allergic reaction with injections.  Had advised on potential of Victoza although he did not seem interested in doing a daily injection.  A1c had increased to 7.8 last time, discussed potential different G LP 1 agonist although had attributed some of his increased to increasing carbohydrate intake.  Was interested in dietary modification 1st.  Most recent A1c is 7.4    Does not take aspirin because of ALLERGY.    eye exam :  Up-to-date (Dr. Rapp)  Sees a dentist regularly.    No neuropathy symptoms     CAD:  Presentation march 2017 with angina. CAD on angio, no revasc ability. Medically managed with statin (was on Lipitor 80 but given suboptimal LDL switched over to Crestor 40), plavix, metoprolol suc 25 daily, losartan 100 mg daily, imdur 30 mg daily.  Had a nuclear stress test done last in December 2, 2020. Normal perfusion scan.  EF normal at rest and with stress.  Last cardiology visit was on 11/05/2020     Hypertension on losartan 100 mg daily,metoprolol 50 mg daily,Amlodipine 5 mg daily .        Ulcerative colitis, prior followed by GI. Currently not taking mesalamine (asymptomatic at this time)    Omeprazole PRN for GERD sx    Chronic left knee pain, no recent injury.  No swelling of the knee.  Does get stiff sometimes.  X-ray in June showed calcification along MCL suggesting ligamentous injury.  Medial compartment narrowing   left greater than right, moderate left-sided suprapatellar joint effusion and superior and inferior patellar articular surface spurring.  Had given topical diclofenac gel, had discussed potential physical therapy.   Went to Orthopedics, was given steroid shot which helped.  However has told that he has severe osteoarthritis and will ultimately need a knee replacement.  However doing okay so far.      Fell and fractured his distal right radius had ORIF done at [facility] in April 2022. Doing OT, overall improving    Mild stable anemia, has had normal B12 testing in the past, had low iron at 1 time.  Is vegetarian but not vegan    Past Medical History:   Diagnosis Date    Coronary artery disease involving native coronary artery of native heart without angina pectoris 7/29/2017    Diabetes mellitus, type II     HTN (hypertension)     Mixed hyperlipidemia 10/24/2019    Ulcerative colitis        Past Surgical History:   Procedure Laterality Date    CATARACT EXTRACTION Bilateral 2013    COLONOSCOPY N/A 1/22/2016    Procedure: COLONOSCOPY;  Surgeon: Aristeo Lynn Jr., MD;  Location: [facility] ENDO;  Service: Endoscopy;  Laterality: N/A;    COLONOSCOPY N/A 10/13/2020    Procedure: COLONOSCOPY;  Surgeon: Charly Travis MD;  Location: [facility];  Service: Endoscopy;  Laterality: N/A;    COLONOSCOPY N/A 5/3/2021    Procedure: COLONOSCOPY;  Surgeon: Charly Travis MD;  Location: [facility];  Service: Endoscopy;  Laterality: N/A;       Family History   Problem Relation Age of Onset    Coronary artery disease Mother 75    Coronary artery disease Father 55    Coronary artery disease Brother 59    Diabetes Brother     Prostate cancer Neg Hx     Colon cancer Neg Hx        Social History     Tobacco Use    Smoking status: Former     Current packs/day: 0.00     Types: Cigarettes     Quit date: 3/16/2008     Years since quitting: 15.5    Smokeless tobacco: Former     Quit date: 11/5/2009   Substance Use Topics    Alcohol use: Not  Currently     Alcohol/week: 2.0 standard drinks of alcohol     Types: 1 Shots of liquor, 1 Glasses of wine per week     Comment: occasionally    Drug use: No       Lab Results   Component Value Date    WBC 8.81 04/21/2023    HGB 12.6 (L) 04/21/2023    HCT 38.6 (L) 04/21/2023    MCV 86 04/21/2023     04/21/2023    CHOL 145 09/07/2023    TRIG 161 (H) 09/07/2023    HDL 39 (L) 09/07/2023    ALT 22 09/07/2023    AST 18 09/07/2023    BILITOT 0.6 09/07/2023    ALKPHOS 66 09/07/2023     09/07/2023    K 4.4 09/07/2023     09/07/2023    CREATININE 0.8 09/07/2023    ESTGFRAFRICA >60.0 07/18/2022    EGFRNONAA >60.0 07/18/2022    CALCIUM 9.3 09/07/2023    ALBUMIN 4.2 09/07/2023    BUN 13 09/07/2023    CO2 24 09/07/2023    TSH 2.519 06/09/2021    PSA 0.97 01/20/2023    INR 1.0 04/06/2022    HGBA1C 7.4 (H) 09/07/2023    MICALBCREAT Unable to calculate 01/20/2023    LDLCALC 73.8 09/07/2023     (H) 09/07/2023       Hemoglobin A1C (%)   Date Value   09/07/2023 7.4 (H)   04/21/2023 7.2 (H)   01/20/2023 7.8 (H)   07/18/2022 7.1 (H)   04/06/2022 7.6 (H)   01/07/2022 6.9 (H)   09/21/2021 7.7 (H)   06/09/2021 7.6 (H)   05/08/2020 8.9 (H)   01/31/2020 8.4 (H)   11/04/2019 8.1 (H)       Vital signs reviewed  PE:   APPEARANCE: Well nourished, well developed, in no acute distress.    HEAD: Normocephalic, atraumatic.  NECK: Supple with no cervical lymphadenopathy.    CHEST: Good inspiratory effort. Lungs clear to auscultation with no wheezes or crackles.  CARDIOVASCULAR: Normal S1, S2. No rubs, murmurs, or gallops.  ABDOMEN: Bowel sounds normal. Not distended. Soft. No tenderness or masses. No organomegaly.  EXTREMITIES: No edema, cyanosis, or clubbing.  DIABETIC FOOT EXAM:  January 2023        IMPRESSION  Encounter Diagnoses   Name Primary?    Type 2 diabetes mellitus with other specified complication, without long-term current use of insulin Yes    Coronary artery disease involving native coronary artery of native  heart with angina pectoris     Gastroesophageal reflux disease, unspecified whether esophagitis present     Encounter for abdominal aortic aneurysm (AAA) screening                  PLAN  Diabetes:  Continue metformin 2 g daily, glipizide 10 b.i.d., Jardiance 25. Add Mounjaro 2.5 mg weekly (insurance justification if needed:  Currently on multiple oral medications with suboptimal A1c.  Was tried on G LP 1 RA preferred agent Bydureon in the past but had an allergic reaction.  Therefore needs alternative GLP 1 RA)    HLD: controlled.     CAD stable, no issues    HTN controlled       UC: no symptoms, no meds, c/scope utd below    Continue orthopedic follow-up for his knee arthritis             F/u 3 mo with labs prior  Orders Placed This Encounter   Procedures    US Abdominal Aorta    (In Office Administered) Pneumococcal Conjugate Vaccine (20 Valent) (IM)    Influenza (FLUAD) - Quadrivalent (Adjuvanted) *Preferred* (65+) (PF)    Hemoglobin A1C    Comprehensive Metabolic Panel       HEALTH SCREENINGS   Immunizations:  Pneumovax   zoster:  Up-to-date  Tetanus around , can get at pharmacy  The COVID vaccine (J/J) 2021.  Pfizer booster on 2021, encourage booster vaccine.  PCV20 due today  Flu today     Age/Gender Appropriate screenings:  Colonoscopy in  diffuse mildly altered vascular mucosa distal descending colon and rectum and sigmoid colon, 5 mm sigmoid polyp, repeat colonoscopy 2 years.  He will make a follow-up appointment with his GI physician.  Prostate screenin2023  AAA screen

## 2023-09-13 ENCOUNTER — PATIENT MESSAGE (OUTPATIENT)
Dept: FAMILY MEDICINE | Facility: CLINIC | Age: 63
End: 2023-09-13
Payer: MEDICARE

## 2023-12-14 ENCOUNTER — LAB VISIT (OUTPATIENT)
Dept: LAB | Facility: HOSPITAL | Age: 63
End: 2023-12-14
Attending: FAMILY MEDICINE
Payer: MEDICARE

## 2023-12-14 DIAGNOSIS — E11.69 TYPE 2 DIABETES MELLITUS WITH OTHER SPECIFIED COMPLICATION, WITHOUT LONG-TERM CURRENT USE OF INSULIN: ICD-10-CM

## 2023-12-14 LAB
ALBUMIN SERPL BCP-MCNC: 4 G/DL (ref 3.5–5.2)
ALP SERPL-CCNC: 66 U/L (ref 55–135)
ALT SERPL W/O P-5'-P-CCNC: 17 U/L (ref 10–44)
ANION GAP SERPL CALC-SCNC: 9 MMOL/L (ref 8–16)
AST SERPL-CCNC: 14 U/L (ref 10–40)
BILIRUB SERPL-MCNC: 0.5 MG/DL (ref 0.1–1)
BUN SERPL-MCNC: 14 MG/DL (ref 8–23)
CALCIUM SERPL-MCNC: 9.2 MG/DL (ref 8.7–10.5)
CHLORIDE SERPL-SCNC: 105 MMOL/L (ref 95–110)
CO2 SERPL-SCNC: 24 MMOL/L (ref 23–29)
CREAT SERPL-MCNC: 0.8 MG/DL (ref 0.5–1.4)
EST. GFR  (NO RACE VARIABLE): >60 ML/MIN/1.73 M^2
ESTIMATED AVG GLUCOSE: 171 MG/DL (ref 68–131)
GLUCOSE SERPL-MCNC: 136 MG/DL (ref 70–110)
HBA1C MFR BLD: 7.6 % (ref 4–5.6)
POTASSIUM SERPL-SCNC: 4.1 MMOL/L (ref 3.5–5.1)
PROT SERPL-MCNC: 7.6 G/DL (ref 6–8.4)
SODIUM SERPL-SCNC: 138 MMOL/L (ref 136–145)

## 2023-12-14 PROCEDURE — 36415 COLL VENOUS BLD VENIPUNCTURE: CPT | Mod: HCNC,PN | Performed by: FAMILY MEDICINE

## 2023-12-14 PROCEDURE — 83036 HEMOGLOBIN GLYCOSYLATED A1C: CPT | Mod: HCNC | Performed by: FAMILY MEDICINE

## 2023-12-14 PROCEDURE — 80053 COMPREHEN METABOLIC PANEL: CPT | Mod: HCNC | Performed by: FAMILY MEDICINE

## 2023-12-21 ENCOUNTER — PATIENT MESSAGE (OUTPATIENT)
Dept: FAMILY MEDICINE | Facility: CLINIC | Age: 63
End: 2023-12-21
Payer: MEDICARE

## 2024-01-09 ENCOUNTER — OFFICE VISIT (OUTPATIENT)
Dept: HOME HEALTH SERVICES | Facility: CLINIC | Age: 64
End: 2024-01-09
Payer: MEDICARE

## 2024-01-09 VITALS
BODY MASS INDEX: 25.02 KG/M2 | HEART RATE: 71 BPM | DIASTOLIC BLOOD PRESSURE: 78 MMHG | WEIGHT: 155 LBS | OXYGEN SATURATION: 98 % | SYSTOLIC BLOOD PRESSURE: 139 MMHG

## 2024-01-09 DIAGNOSIS — I25.119 CORONARY ARTERY DISEASE INVOLVING NATIVE CORONARY ARTERY OF NATIVE HEART WITH ANGINA PECTORIS: ICD-10-CM

## 2024-01-09 DIAGNOSIS — I10 ESSENTIAL HYPERTENSION: ICD-10-CM

## 2024-01-09 DIAGNOSIS — K51.90 ULCERATIVE COLITIS WITHOUT COMPLICATIONS, UNSPECIFIED LOCATION: ICD-10-CM

## 2024-01-09 DIAGNOSIS — H91.90 HEARING LOSS, UNSPECIFIED HEARING LOSS TYPE, UNSPECIFIED LATERALITY: ICD-10-CM

## 2024-01-09 DIAGNOSIS — Z00.00 ENCOUNTER FOR PREVENTIVE HEALTH EXAMINATION: Primary | ICD-10-CM

## 2024-01-09 DIAGNOSIS — E11.9 TYPE 2 DIABETES MELLITUS WITHOUT COMPLICATION, WITHOUT LONG-TERM CURRENT USE OF INSULIN: ICD-10-CM

## 2024-01-09 DIAGNOSIS — E78.2 MIXED HYPERLIPIDEMIA: ICD-10-CM

## 2024-01-09 DIAGNOSIS — K21.9 GASTROESOPHAGEAL REFLUX DISEASE, UNSPECIFIED WHETHER ESOPHAGITIS PRESENT: ICD-10-CM

## 2024-01-09 PROCEDURE — G0402 INITIAL PREVENTIVE EXAM: HCPCS | Mod: S$GLB,,, | Performed by: NURSE PRACTITIONER

## 2024-01-09 NOTE — PATIENT INSTRUCTIONS
Counseling and Referral of Other Preventative  (Italic type indicates deductible and co-insurance are waived)    Patient Name: Michoacano Lind  Today's Date: 1/9/2024    Health Maintenance       Date Due Completion Date    RSV Vaccine (Age 60+ and Pregnant patients) (1 - 1-dose 60+ series) Never done ---    Colorectal Cancer Screening 05/03/2023 5/3/2021    COVID-19 Vaccine (3 - 2023-24 season) 09/01/2023 12/7/2021    Eye Exam 11/17/2023 11/17/2022    Override on 5/17/2018: Done    Override on 3/13/2017: Done (dr. Rapp)    Diabetes Urine Screening 01/20/2024 1/20/2023    Foot Exam 01/27/2024 1/27/2023    Hemoglobin A1c 06/14/2024 12/14/2023    Lipid Panel 09/07/2024 9/7/2023    High Dose Statin 09/12/2024 9/12/2023    Aspirin/Antiplatelet Therapy 09/12/2024 9/12/2023    TETANUS VACCINE 06/08/2032 6/8/2022    Override on 9/13/2013: Done        No orders of the defined types were placed in this encounter.      The following information is provided to all patients.  This information is to help you find resources for any of the problems found today that may be affecting your health:                  Living healthy guide: www.[facility].[place].gov      Understanding Diabetes: www.diabetes.org      Eating healthy: www.cdc.gov/healthyweight      CDC home safety checklist: www.cdc.gov/steadi/patient.html      Agency on Aging: www.goea.[place].gov      Alcoholics anonymous (AA): www.aa.org      Physical Activity: www.willis.nih.gov/lg3zhjs      Tobacco use: www.quitwithusla.org

## 2024-01-09 NOTE — PROGRESS NOTES
Michoacano Lind presented for an initial Medicare AWV today. The following components were reviewed and updated:    Medical history  Family History  Social history  Allergies and Current Medications  Health Risk Assessment  Health Maintenance  Care Team    **See Completed Assessments for Annual Wellness visit with in the encounter summary    The following assessments were completed:  Depression Screening  Cognitive function Screening    Timed Get Up Test  Whisper Test      Opioid documentation:      Patient does not have a current opioid prescription.          Vitals:    01/09/24 1116   BP: 139/78   Pulse: 71   SpO2: 98%   Weight: 70.3 kg (155 lb)     Body mass index is 25.02 kg/m².       Physical Exam  Constitutional:       Appearance: Normal appearance.   HENT:      Head: Normocephalic.      Nose: Nose normal.   Eyes:      Pupils: Pupils are equal, round, and reactive to light.   Cardiovascular:      Rate and Rhythm: Normal rate and regular rhythm.      Pulses: Normal pulses.      Heart sounds: Normal heart sounds.   Pulmonary:      Effort: Pulmonary effort is normal.   Abdominal:      General: Bowel sounds are normal.   Musculoskeletal:         General: Normal range of motion.      Cervical back: Normal range of motion.      Right lower leg: No edema.      Left lower leg: No edema.   Skin:     General: Skin is warm and dry.   Neurological:      Mental Status: He is alert and oriented to person, place, and time.           Diagnoses and health risks identified today and associated recommendations/orders:  1. Encounter for preventive health examination  - Above assessments completed. Preventive measures and health maintenance reviewed with patient.  -discussed overdue vaccines, advised can have done at any pharmacy    2. Essential hypertension  Stable, followed by PCP  -on amlodipine, losartan and metoprolol    3. Type 2 diabetes mellitus without complication, without long-term current use of insulin  Stable,  followed by PCP  -A1C 7.6, on metformin, jardiance, glipizide  -encouraged yearly eye exams    4. Mixed hyperlipidemia  Stable, followed by PCP  -on statin    5. Coronary artery disease involving native coronary artery of native heart with angina pectoris  Stable, followed by PCP  -on statin and clopidogrel    6. Gastroesophageal reflux disease, unspecified whether esophagitis present  Stable, followed by PCP  -on famotidine    7. Ulcerative colitis without complications, unspecified location  Stable, followed by GI  -no meds, has been stable    8. Hearing loss, unspecified hearing loss type, unspecified laterality  -audiology referral, pt will schedule at his convenience      Provided Michoacano with a 5-10 year written screening schedule and personal prevention plan. Recommendations were developed using the USPSTF age appropriate recommendations. Education, counseling, and referrals were provided as needed.  After Visit Summary printed and given to patient which includes a list of additional screenings\tests needed.    Follow up in about 1 year (around 1/9/2025) for your next annual wellness visit.      Skylar Beck NP  I offered to discuss advanced care planning, including how to pick a person who would make decisions for you if you were unable to make them for yourself, called a health care power of , and what kind of decisions you might make such as use of life sustaining treatments such as ventilators and tube feeding when faced with a life limiting illness recorded on a living will that they will need to know. (How you want to be cared for as you near the end of your natural life)     X Patient is interested in learning more about how to make advanced directives.  I provided them paperwork and offered to discuss this with them.

## 2024-01-11 ENCOUNTER — PATIENT MESSAGE (OUTPATIENT)
Dept: FAMILY MEDICINE | Facility: CLINIC | Age: 64
End: 2024-01-11
Payer: MEDICARE

## 2024-01-12 ENCOUNTER — PATIENT OUTREACH (OUTPATIENT)
Dept: ADMINISTRATIVE | Facility: HOSPITAL | Age: 64
End: 2024-01-12
Payer: MEDICARE

## 2024-01-12 ENCOUNTER — OFFICE VISIT (OUTPATIENT)
Dept: FAMILY MEDICINE | Facility: CLINIC | Age: 64
End: 2024-01-12
Payer: MEDICARE

## 2024-01-12 VITALS
DIASTOLIC BLOOD PRESSURE: 74 MMHG | SYSTOLIC BLOOD PRESSURE: 136 MMHG | HEART RATE: 72 BPM | TEMPERATURE: 98 F | OXYGEN SATURATION: 97 % | WEIGHT: 155 LBS | HEIGHT: 66 IN | BODY MASS INDEX: 24.91 KG/M2

## 2024-01-12 DIAGNOSIS — E11.69 TYPE 2 DIABETES MELLITUS WITH OTHER SPECIFIED COMPLICATION, WITHOUT LONG-TERM CURRENT USE OF INSULIN: Primary | ICD-10-CM

## 2024-01-12 PROCEDURE — 99214 OFFICE O/P EST MOD 30 MIN: CPT | Mod: S$GLB,,, | Performed by: FAMILY MEDICINE

## 2024-01-12 PROCEDURE — 99999 PR PBB SHADOW E&M-EST. PATIENT-LVL IV: CPT | Mod: PBBFAC,,, | Performed by: FAMILY MEDICINE

## 2024-01-12 NOTE — PROGRESS NOTES
LPN Care Coordination Encounter Details:    Outreach Performed: NO Faxed to obtain external record       Recent ED and/or IP Discharges:    Last PCP Visit Date: 2024          [x]  Reviewed recent ED & Inpatient Discharges to ensure appropriate           follow up appointments are scheduled         Additional Notes:             Provider Team Continuity:        [x]  Reviewed Primary Care Provider Visits, Annual Wellness Visit, and Future          Appointments to ensure appointments have been scheduled and/or           completed        Additional Notes:             MyChart Portal Status:         [x]  Reviewed MyChart Portal Status offered / enrolled if applicable        Additional Notes:     MyChart Outcomes: Pt is enrolled & active            Health Maintenance Screening(s) Due:      Additional [facility] Health Notes:                  Updates Requested / Reviewed:        Updated Care Coordination Note, Care Everywhere, , Care Team Updated, Removed  or Duplicate Orders, and Immunizations Reconciliation Completed or Queried: [place]         Health Maintenance Topics Overdue:      [facility] Score: 2     Colon Cancer Screening  Eye Exam                Health Maintenance Topic(s) Outreach Outcomes & Actions Taken:    Eye Exam - Outreach Outcomes & Actions Taken  : External Records Requested & Care Team Updated if Applicable      Chronic Disease Management:     Diabetes Measures        Lab Results   Component Value Date    HGBA1C 7.6 (H) 2023           [x]  Reviewed chart for active Diabetes diagnosis     [x]  Scheduled necessary follow up appointments if needed         Additional Notes:             Hypertension Measures        BP Readings from Last 1 Encounters:   24 136/74

## 2024-01-12 NOTE — LETTER
AUTHORIZATION FOR RELEASE OF   CONFIDENTIAL INFORMATION    Dr. Darya Rapp,    We are seeing Michoacano Lind, date of birth 1960, in the clinic at [facility] MED[facility]. Facundo Alexis MD is the patient's PCP. Michoacano Lind has an outstanding lab/procedure at the time we reviewed his chart. In order to help keep his health information updated, he has authorized us to request the following medical record(s):                                                 ( X )  EYE EXAM                  Please fax records to [facility], [place], Mehul Sharad, MD, [phone]     If you have any questions, please contact AMENA Valdez at [phone].           Patient Name: Michoacano Lind  : 1960  Patient Phone #: [phone]

## 2024-01-12 NOTE — PROGRESS NOTES
(Portions of this note were dictated using voice recognition software and may contain dictation related errors in spelling/grammar/syntax not found on text review)         Chief Complaint   Patient presents with    Follow-up     3m       HPI: 63 y.o. male       Diabetes, on metformin  2 pills am/2 pill pm.  glipizide 10 mg twice a day, Jardiance 25 mg daily.  Tried to switch him to weekly G LP 1 agonist with Trulicity but apparently this was not covered and recommendation was to try bydureon, Victoza, or Byetta 1st.  Tried later by to do Bydureon weekly but apparently had some local allergic reaction with injections.  Had advised on potential of Victoza although he did not seem interested in doing a daily injection.  Ended up going to Mounjaro but even at 2.5 mg had significant appetite loss and did not feel well so he got off of it after 1 injection.  Current A1c 7.6    Does not take aspirin because of ALLERGY.    eye exam :  Up-to-date (Dr. Rapp)  Sees a dentist regularly.    No neuropathy symptoms     CAD:  Presentation march 2017 with angina. CAD on angio, no revasc ability. Medically managed with statin (was on Lipitor 80 but given suboptimal LDL switched over to Crestor 40), plavix, metoprolol suc 25 daily, losartan 100 mg daily, imdur 30 mg daily.  Had a nuclear stress test done last in December 2, 2020. Normal perfusion scan.  EF normal at rest and with stress.  Last cardiology visit was on 11/05/2020     Hypertension on losartan 100 mg daily,metoprolol 50 mg daily,Amlodipine 5 mg daily .        Ulcerative colitis, prior followed by GI. Currently not taking mesalamine (asymptomatic at this time)    Omeprazole PRN for GERD sx    Chronic left knee pain, no recent injury.  No swelling of the knee.  Does get stiff sometimes.  X-ray in June showed calcification along MCL suggesting ligamentous injury.  Medial compartment narrowing  left greater than right, moderate left-sided suprapatellar joint  effusion and superior and inferior patellar articular surface spurring.  Had given topical diclofenac gel, had discussed potential physical therapy.   Went to Orthopedics, was given steroid shot which helped.  However has told that he has severe osteoarthritis and will ultimately need a knee replacement.  However doing okay so far.      Mild stable anemia, has had normal B12 testing in the past, had low iron at 1 time.  Is vegetarian but not vegan    Past Medical History:   Diagnosis Date    Coronary artery disease involving native coronary artery of native heart without angina pectoris 7/29/2017    Diabetes mellitus, type II     HTN (hypertension)     Mixed hyperlipidemia 10/24/2019    Ulcerative colitis        Past Surgical History:   Procedure Laterality Date    CATARACT EXTRACTION Bilateral 2013    COLONOSCOPY N/A 1/22/2016    Procedure: COLONOSCOPY;  Surgeon: Aristeo Lynn Jr., MD;  Location: [facility] ENDO;  Service: Endoscopy;  Laterality: N/A;    COLONOSCOPY N/A 10/13/2020    Procedure: COLONOSCOPY;  Surgeon: Charly Travis MD;  Location: [facility] ENDO;  Service: Endoscopy;  Laterality: N/A;    COLONOSCOPY N/A 5/3/2021    Procedure: COLONOSCOPY;  Surgeon: Charly Travis MD;  Location: [facility] ENDO;  Service: Endoscopy;  Laterality: N/A;       Family History   Problem Relation Age of Onset    Coronary artery disease Mother 75    Coronary artery disease Father 55    Coronary artery disease Brother 59    Diabetes Brother     Prostate cancer Neg Hx     Colon cancer Neg Hx        Social History     Tobacco Use    Smoking status: Former     Current packs/day: 0.00     Types: Cigarettes     Quit date: 3/16/2008     Years since quitting: 15.8    Smokeless tobacco: Former     Quit date: 11/5/2009   Substance Use Topics    Alcohol use: Not Currently     Alcohol/week: 2.0 standard drinks of alcohol     Types: 1 Shots of liquor, 1 Glasses of wine per week     Comment: occasionally    Drug use: No       Lab Results    Component Value Date    WBC 8.81 04/21/2023    HGB 12.6 (L) 04/21/2023    HCT 38.6 (L) 04/21/2023    MCV 86 04/21/2023     04/21/2023    CHOL 145 09/07/2023    TRIG 161 (H) 09/07/2023    HDL 39 (L) 09/07/2023    ALT 17 12/14/2023    AST 14 12/14/2023    BILITOT 0.5 12/14/2023    ALKPHOS 66 12/14/2023     12/14/2023    K 4.1 12/14/2023     12/14/2023    CREATININE 0.8 12/14/2023    ESTGFRAFRICA >60.0 07/18/2022    EGFRNONAA >60.0 07/18/2022    CALCIUM 9.2 12/14/2023    ALBUMIN 4.0 12/14/2023    BUN 14 12/14/2023    CO2 24 12/14/2023    TSH 2.519 06/09/2021    PSA 0.97 01/20/2023    INR 1.0 04/06/2022    HGBA1C 7.6 (H) 12/14/2023    MICALBCREAT Unable to calculate 01/20/2023    LDLCALC 73.8 09/07/2023     (H) 12/14/2023       Hemoglobin A1C (%)   Date Value   12/14/2023 7.6 (H)   09/07/2023 7.4 (H)   04/21/2023 7.2 (H)   01/20/2023 7.8 (H)   07/18/2022 7.1 (H)   04/06/2022 7.6 (H)   01/07/2022 6.9 (H)   09/21/2021 7.7 (H)   06/09/2021 7.6 (H)   05/08/2020 8.9 (H)   01/31/2020 8.4 (H)       Vital signs reviewed  PE:   APPEARANCE: Well nourished, well developed, in no acute distress.    HEAD: Normocephalic, atraumatic.  NECK: Supple with no cervical lymphadenopathy.    CHEST: Good inspiratory effort. Lungs clear to auscultation with no wheezes or crackles.  CARDIOVASCULAR: Normal S1, S2. No rubs, murmurs, or gallops.  ABDOMEN: Bowel sounds normal. Not distended. Soft. No tenderness or masses. No organomegaly.  EXTREMITIES: No edema   DIABETIC FOOT EXAM:  January 2023        IMPRESSION  Encounter Diagnosis   Name Primary?    Type 2 diabetes mellitus with other specified complication, without long-term current use of insulin Yes                   PLAN  Diabetes:  Continue metformin 2 g daily, glipizide 10 b.i.d., Jardiance 25. Did not tolerate Mounjaro.  Was on Januvia at 1 point in the past, will add this back 100 mg daily     HLD: controlled.     CAD stable, no issues    HTN controlled        UC: no symptoms, no meds, c/scope utd below            F/u 3 mo with labs prior, will add PSA to next set of labs  Orders Placed This Encounter   Procedures    CBC Auto Differential    Comprehensive Metabolic Panel    Hemoglobin A1C    Lipid Panel    PSA, Screening       HEALTH SCREENINGS   Immunizations:  Pneumovax   zoster:  Up-to-date  Tetanus  22  The COVID vaccine (J/J) 2021.  Pfizer booster on 2021, encourage booster vaccine.  PCV20 utd  Flu utd     Age/Gender Appropriate screenings:  Colonoscopy in  diffuse mildly altered vascular mucosa distal descending colon and rectum and sigmoid colon, 5 mm sigmoid polyp, repeat colonoscopy 2 years.  He will make a follow-up appointment with his GI physician.  Prostate screenin2023  AAA screen negative 2023

## 2024-01-15 ENCOUNTER — ON-DEMAND VIRTUAL (OUTPATIENT)
Dept: URGENT CARE | Facility: CLINIC | Age: 64
End: 2024-01-15
Payer: MEDICARE

## 2024-01-15 DIAGNOSIS — R68.89 FLU-LIKE SYMPTOMS: Primary | ICD-10-CM

## 2024-01-15 PROCEDURE — 99212 OFFICE O/P EST SF 10 MIN: CPT | Mod: 95,,, | Performed by: NURSE PRACTITIONER

## 2024-01-16 NOTE — PROGRESS NOTES
Subjective:      Patient ID: Michoacano Lind is a 63 y.o. male.    Vitals:  vitals were not taken for this visit.     Chief Complaint: Cough      Visit Type: TELE AUDIOVISUAL    Present with the patient at the time of consultation: TELEMED PRESENT WITH PATIENT: None    Past Medical History:   Diagnosis Date    Coronary artery disease involving native coronary artery of native heart without angina pectoris 7/29/2017    Diabetes mellitus, type II     HTN (hypertension)     Mixed hyperlipidemia 10/24/2019    Ulcerative colitis      Past Surgical History:   Procedure Laterality Date    CATARACT EXTRACTION Bilateral 2013    COLONOSCOPY N/A 1/22/2016    Procedure: COLONOSCOPY;  Surgeon: Aristeo Lynn Jr., MD;  Location: [facility] ENDO;  Service: Endoscopy;  Laterality: N/A;    COLONOSCOPY N/A 10/13/2020    Procedure: COLONOSCOPY;  Surgeon: Charly Travis MD;  Location: [facility] ENDO;  Service: Endoscopy;  Laterality: N/A;    COLONOSCOPY N/A 5/3/2021    Procedure: COLONOSCOPY;  Surgeon: Charly Travis MD;  Location: [facility] ENDO;  Service: Endoscopy;  Laterality: N/A;     Review of patient's allergies indicates:   Allergen Reactions    Aspirin     Lisinopril Other (See Comments)     Cough 2/2 aceI    Aspirin      Other^bloody diarrhea    Bydureon [exenatide microspheres]      Rash, loss of appetite.      Current Outpatient Medications on File Prior to Visit   Medication Sig Dispense Refill    ACCU-CHEK GUIDE TEST STRIPS Strp USE TO CHECK BLOOD SUGAR TWO TIMES A  strip 3    amLODIPine (NORVASC) 5 MG tablet TAKE ONE TABLET (5 MG TOTAL) BY MOUTH ONCE DAILY 90 tablet 3    blood-glucose meter kit To check BG 2 times daily, to use with insurance preferred meter 1 each 0    clopidogreL (PLAVIX) 75 mg tablet Take 1 tablet (75 mg total) by mouth once daily. 90 tablet 3    diclofenac sodium (VOLTAREN) 1 % Gel APPLY 2 GRAMS TOPICALLY 4 TIMES DAILY AS NEEDED FOR PAIN (Patient not taking: Reported on 1/12/2024) 100 g 4     empagliflozin (JARDIANCE) 25 mg tablet Take 1 tablet (25 mg total) by mouth once daily. 90 tablet 3    famotidine (PEPCID) 20 MG tablet Take 1 tablet (20 mg total) by mouth 2 (two) times daily as needed for Heartburn. (Patient not taking: Reported on 1/12/2024) 60 tablet 11    glipiZIDE (GLUCOTROL) 10 MG tablet TAKE ONE TABLET BY MOUTH TWO TIMES A DAY BEFORE MEALS 180 tablet 3    lancets Misc To check BG 2 times daily, to use with insurance preferred meter 100 each 11    losartan (COZAAR) 100 MG tablet TAKE 1 TABLET (100MG) BY MOUTH ONCE DAILY 90 tablet 3    metFORMIN (GLUCOPHAGE-XR) 500 MG ER 24hr tablet TAKE 2 TABLETS BY MOUTH TWO TIMES A DAY WITH MEALS 360 tablet 1    metoprolol succinate (TOPROL-XL) 50 MG 24 hr tablet Take 1 tablet (50 mg total) by mouth once daily. 90 tablet 3    nitroGLYCERIN (NITROSTAT) 0.4 MG SL tablet Place 1 tablet (0.4 mg total) under the tongue every 5 (five) minutes as needed for Chest pain. (Patient not taking: Reported on 1/12/2024) 30 tablet 1    rosuvastatin (CRESTOR) 40 MG Tab Take 1 tablet (40 mg total) by mouth once daily. (Patient not taking: Reported on 1/12/2024) 90 tablet 3    SITagliptin phosphate (JANUVIA) 100 MG Tab Take 1 tablet (100 mg total) by mouth once daily. 90 tablet 3     No current facility-administered medications on file prior to visit.     Family History   Problem Relation Age of Onset    Coronary artery disease Mother 75    Coronary artery disease Father 55    Coronary artery disease Brother 59    Diabetes Brother     Prostate cancer Neg Hx     Colon cancer Neg Hx            Ohs Peq Odvv Intake    1/15/2024  9:41 PM CST - Filed by Patient   What is your current physical address in the event of a medical emergency? Dr mcm[place]   Are you able to take your vital signs? No   Please attach any relevant images or files            Fever, body aches, sore throat, cough that began 2 days ago.   He states that he had a doctors appt on Friday with PCP and the next day he  started with symptoms the following day. At home COVID test negative. Has been taking nyquil and dayquil and is starting to feel bad.       ROS     Objective:   The physical exam was conducted virtually.  Physical Exam   Constitutional: He is oriented to person, place, and time. No distress.   HENT:   Head: Normocephalic and atraumatic.   Mouth/Throat: Oropharynx is clear and moist and mucous membranes are normal.   Eyes: Conjunctivae are normal. No scleral icterus.   Pulmonary/Chest: Effort normal. No respiratory distress.   Musculoskeletal: Normal range of motion.         General: Normal range of motion.   Neurological: He is alert and oriented to person, place, and time.   Skin: Skin is not diaphoretic.   Psychiatric: His behavior is normal. Judgment and thought content normal.   Vitals reviewed.      Assessment:     1. Flu-like symptoms        Plan:       Flu-like symptoms      -recommend re-testing for COVID tomorrow. If + he is a candidate for antiviral medication. He verb an understanding.   -recommend to continue OTC medications as he is taking now            Thank you for choosing [facility]!    Our goal in the [facility] is to always provide outstanding medical care. You may receive a survey by mail or e-mail in the next week regarding your experience today. We would greatly appreciate you completing and returning the survey. Your feedback provides us with a way to recognize our staff who provide very good care, and it helps us learn how to improve when your experience was below our aspiration of excellence.         We appreciate you trusting us with your medical care. We hope you feel better soon. We will be happy to take care of you for all of your future medical needs.    You must understand that you've received an Urgent Care treatment only and that you may be released before all your medical problems are known or treated. You, the patient, will arrange for follow  up care as instructed.    Follow up with your PCP or specialty clinic as directed in the next 1-2 weeks if not improved or as needed.  You can call [phone] to schedule an appointment with the appropriate provider.    If your condition worsens we recommend that you receive another evaluation in person, with your primary care provider, urgent care or at the emergency room immediately or contact your primary medical clinics after hours call service to discuss your concerns.

## 2024-01-23 ENCOUNTER — PATIENT OUTREACH (OUTPATIENT)
Dept: ADMINISTRATIVE | Facility: HOSPITAL | Age: 64
End: 2024-01-23
Payer: MEDICARE

## 2024-01-23 NOTE — PROGRESS NOTES
Population Health Chart Review & Patient Outreach Details    Outreach Performed: YES Telephone Successful    Additional Pop Health Notes:    Pt stated most recent eye exam completed with Dr. Darya Rapp       Updates Requested / Reviewed:      Updated Care Coordination Note, Care Everywhere, , Care Team Updated, Removed  or Duplicate Orders, and Immunizations Reconciliation Completed or Queried: [place]         Health Maintenance Topics Overdue:    Health Maintenance Due   Topic Date Due    RSV Vaccine (Age 60+ and Pregnant patients) (1 - 1-dose 60+ series) Never done    Colorectal Cancer Screening  2023    COVID-19 Vaccine (3 - -24 season) 2023    Eye Exam  2023    Diabetes Urine Screening  2024    Foot Exam  2024         Health Maintenance Topic(s) Outreach Outcomes & Actions Taken:    Eye Exam - Outreach Outcomes & Actions Taken  : Pt says eye exam completed; however, most recent eye exam was in  with Dr. Rapp

## 2024-02-12 DIAGNOSIS — E11.69 TYPE 2 DIABETES MELLITUS WITH OTHER SPECIFIED COMPLICATION, WITHOUT LONG-TERM CURRENT USE OF INSULIN: ICD-10-CM

## 2024-02-12 NOTE — TELEPHONE ENCOUNTER
Care Due:                  Date            Visit Type   Department     Provider  --------------------------------------------------------------------------------                                EP -                              PRIMARY      [facility]  Last Visit: 01-      CARE ([facility])   GABRIEL Alexis                               -                              [facility]  Next Visit: 04-      CARE ([facility])   [facility]                                                            Last  Test          Frequency    Reason                     Performed    Due Date  --------------------------------------------------------------------------------    CBC.........  12 months..  diclofenac...............  04- 04-    Health [facility] Embedded Care Due Messages. Reference number: [id number].   2/12/2024 11:30:15 AM CST

## 2024-02-14 RX ORDER — METFORMIN HYDROCHLORIDE 500 MG/1
TABLET, EXTENDED RELEASE ORAL
Qty: 360 TABLET | Refills: 1 | Status: SHIPPED | OUTPATIENT
Start: 2024-02-14

## 2024-02-14 RX ORDER — BLOOD-GLUCOSE METER
EACH MISCELLANEOUS
Qty: 200 STRIP | Refills: 3 | Status: SHIPPED | OUTPATIENT
Start: 2024-02-14

## 2024-02-14 NOTE — TELEPHONE ENCOUNTER
Refill Routing Note   Medication(s) are not appropriate for processing by [facility] for the following reason(s):        Drug-disease interaction  No active prescription written by provider    [facility] action(s):  Defer        Medication Therapy Plan: FLOS 4/5/2024;  metFORMIN and Colitis; Idiopathic ulcerative colitis; Patient has been on accu-check test strips    Pharmacist review requested: Yes     Appointments  past 12m or future 3m with PCP    Date Provider   Last Visit   1/12/2024 Facundo Alexis MD   Next Visit   4/12/2024 Facundo Alexis MD   ED visits in past 90 days: 0        Note composed:11:37 AM 02/14/2024

## 2024-02-14 NOTE — TELEPHONE ENCOUNTER
Refill Decision Note   Michoacanostefan Lind  is requesting a refill authorization.  Brief Assessment and Rationale for Refill:  Approve     Medication Therapy Plan:  FLOS 4/5/2024      Pharmacist review requested: Yes   Extended chart review required: Yes   Comments:     Note composed:2:50 PM 02/14/2024

## 2024-03-27 DIAGNOSIS — E11.9 TYPE 2 DIABETES MELLITUS WITHOUT COMPLICATION: ICD-10-CM

## 2024-04-03 ENCOUNTER — PATIENT OUTREACH (OUTPATIENT)
Dept: ADMINISTRATIVE | Facility: HOSPITAL | Age: 64
End: 2024-04-03
Payer: MEDICARE

## 2024-04-03 NOTE — PROGRESS NOTES
Population Health Chart Review & Patient Outreach Details      Additional [facility] Health Notes:    Linked microalbumin to lab appointment       Updates Requested / Reviewed:      Updated Care Coordination Note, Care Everywhere, Care Team Updated, and Immunizations Reconciliation Completed or Queried: [facility] Topics Overdue:      [facility] Score: 3     Colon Cancer Screening  Eye Exam  Foot Exam    RSV Vaccine                  Health Maintenance Topic(s) Outreach Outcomes & Actions Taken:    Lab(s) - Outreach Outcomes & Actions Taken  : Overdue Lab(s) Scheduled

## 2024-04-05 ENCOUNTER — LAB VISIT (OUTPATIENT)
Dept: LAB | Facility: HOSPITAL | Age: 64
End: 2024-04-05
Attending: FAMILY MEDICINE
Payer: MEDICARE

## 2024-04-05 DIAGNOSIS — E11.69 TYPE 2 DIABETES MELLITUS WITH OTHER SPECIFIED COMPLICATION, WITHOUT LONG-TERM CURRENT USE OF INSULIN: ICD-10-CM

## 2024-04-05 DIAGNOSIS — E11.9 TYPE 2 DIABETES MELLITUS WITHOUT COMPLICATION: ICD-10-CM

## 2024-04-05 LAB
ALBUMIN SERPL BCP-MCNC: 3.8 G/DL (ref 3.5–5.2)
ALBUMIN/CREAT UR: NORMAL UG/MG (ref 0–30)
ALP SERPL-CCNC: 66 U/L (ref 55–135)
ALT SERPL W/O P-5'-P-CCNC: 20 U/L (ref 10–44)
ANION GAP SERPL CALC-SCNC: 6 MMOL/L (ref 8–16)
AST SERPL-CCNC: 17 U/L (ref 10–40)
BASOPHILS # BLD AUTO: 0.07 K/UL (ref 0–0.2)
BASOPHILS NFR BLD: 0.8 % (ref 0–1.9)
BILIRUB SERPL-MCNC: 0.4 MG/DL (ref 0.1–1)
BUN SERPL-MCNC: 13 MG/DL (ref 8–23)
CALCIUM SERPL-MCNC: 9.3 MG/DL (ref 8.7–10.5)
CHLORIDE SERPL-SCNC: 105 MMOL/L (ref 95–110)
CHOLEST SERPL-MCNC: 99 MG/DL (ref 120–199)
CHOLEST/HDLC SERPL: 2.8 {RATIO} (ref 2–5)
CO2 SERPL-SCNC: 23 MMOL/L (ref 23–29)
COMPLEXED PSA SERPL-MCNC: 0.5 NG/ML (ref 0–4)
CREAT SERPL-MCNC: 0.8 MG/DL (ref 0.5–1.4)
CREAT UR-MCNC: 40 MG/DL (ref 23–375)
DIFFERENTIAL METHOD BLD: ABNORMAL
EOSINOPHIL # BLD AUTO: 0.5 K/UL (ref 0–0.5)
EOSINOPHIL NFR BLD: 5.7 % (ref 0–8)
ERYTHROCYTE [DISTWIDTH] IN BLOOD BY AUTOMATED COUNT: 12.7 % (ref 11.5–14.5)
EST. GFR  (NO RACE VARIABLE): >60 ML/MIN/1.73 M^2
ESTIMATED AVG GLUCOSE: 157 MG/DL (ref 68–131)
GLUCOSE SERPL-MCNC: 108 MG/DL (ref 70–110)
HBA1C MFR BLD: 7.1 % (ref 4–5.6)
HCT VFR BLD AUTO: 36.6 % (ref 40–54)
HDLC SERPL-MCNC: 35 MG/DL (ref 40–75)
HDLC SERPL: 35.4 % (ref 20–50)
HGB BLD-MCNC: 12.2 G/DL (ref 14–18)
IMM GRANULOCYTES # BLD AUTO: 0.03 K/UL (ref 0–0.04)
IMM GRANULOCYTES NFR BLD AUTO: 0.3 % (ref 0–0.5)
LDLC SERPL CALC-MCNC: 44 MG/DL (ref 63–159)
LYMPHOCYTES # BLD AUTO: 2.2 K/UL (ref 1–4.8)
LYMPHOCYTES NFR BLD: 25.3 % (ref 18–48)
MCH RBC QN AUTO: 28.4 PG (ref 27–31)
MCHC RBC AUTO-ENTMCNC: 33.3 G/DL (ref 32–36)
MCV RBC AUTO: 85 FL (ref 82–98)
MICROALBUMIN UR DL<=1MG/L-MCNC: <5 UG/ML
MONOCYTES # BLD AUTO: 0.6 K/UL (ref 0.3–1)
MONOCYTES NFR BLD: 6.8 % (ref 4–15)
NEUTROPHILS # BLD AUTO: 5.3 K/UL (ref 1.8–7.7)
NEUTROPHILS NFR BLD: 61.1 % (ref 38–73)
NONHDLC SERPL-MCNC: 64 MG/DL
NRBC BLD-RTO: 0 /100 WBC
PLATELET # BLD AUTO: 270 K/UL (ref 150–450)
PMV BLD AUTO: 11.5 FL (ref 9.2–12.9)
POTASSIUM SERPL-SCNC: 4.4 MMOL/L (ref 3.5–5.1)
PROT SERPL-MCNC: 7.4 G/DL (ref 6–8.4)
RBC # BLD AUTO: 4.29 M/UL (ref 4.6–6.2)
SODIUM SERPL-SCNC: 134 MMOL/L (ref 136–145)
TRIGL SERPL-MCNC: 100 MG/DL (ref 30–150)
WBC # BLD AUTO: 8.73 K/UL (ref 3.9–12.7)

## 2024-04-05 PROCEDURE — 84153 ASSAY OF PSA TOTAL: CPT | Performed by: FAMILY MEDICINE

## 2024-04-05 PROCEDURE — 80061 LIPID PANEL: CPT | Performed by: FAMILY MEDICINE

## 2024-04-05 PROCEDURE — 80053 COMPREHEN METABOLIC PANEL: CPT | Performed by: FAMILY MEDICINE

## 2024-04-05 PROCEDURE — 83036 HEMOGLOBIN GLYCOSYLATED A1C: CPT | Performed by: FAMILY MEDICINE

## 2024-04-05 PROCEDURE — 85025 COMPLETE CBC W/AUTO DIFF WBC: CPT | Performed by: FAMILY MEDICINE

## 2024-04-05 PROCEDURE — 36415 COLL VENOUS BLD VENIPUNCTURE: CPT | Mod: PN | Performed by: FAMILY MEDICINE

## 2024-04-05 PROCEDURE — 82043 UR ALBUMIN QUANTITATIVE: CPT | Performed by: FAMILY MEDICINE

## 2024-04-12 ENCOUNTER — OFFICE VISIT (OUTPATIENT)
Dept: FAMILY MEDICINE | Facility: CLINIC | Age: 64
End: 2024-04-12
Payer: MEDICARE

## 2024-04-12 VITALS
BODY MASS INDEX: 24.8 KG/M2 | TEMPERATURE: 98 F | HEIGHT: 66 IN | DIASTOLIC BLOOD PRESSURE: 76 MMHG | SYSTOLIC BLOOD PRESSURE: 132 MMHG | HEART RATE: 69 BPM | OXYGEN SATURATION: 99 % | WEIGHT: 154.31 LBS

## 2024-04-12 DIAGNOSIS — E11.69 TYPE 2 DIABETES MELLITUS WITH OTHER SPECIFIED COMPLICATION, WITHOUT LONG-TERM CURRENT USE OF INSULIN: Primary | ICD-10-CM

## 2024-04-12 PROCEDURE — 3051F HG A1C>EQUAL 7.0%<8.0%: CPT | Mod: CPTII,S$GLB,, | Performed by: FAMILY MEDICINE

## 2024-04-12 PROCEDURE — 1101F PT FALLS ASSESS-DOCD LE1/YR: CPT | Mod: CPTII,S$GLB,, | Performed by: FAMILY MEDICINE

## 2024-04-12 PROCEDURE — 99999 PR PBB SHADOW E&M-EST. PATIENT-LVL IV: CPT | Mod: PBBFAC,,, | Performed by: FAMILY MEDICINE

## 2024-04-12 PROCEDURE — 1159F MED LIST DOCD IN RCRD: CPT | Mod: CPTII,S$GLB,, | Performed by: FAMILY MEDICINE

## 2024-04-12 PROCEDURE — G2211 COMPLEX E/M VISIT ADD ON: HCPCS | Mod: S$GLB,,, | Performed by: FAMILY MEDICINE

## 2024-04-12 PROCEDURE — 3008F BODY MASS INDEX DOCD: CPT | Mod: CPTII,S$GLB,, | Performed by: FAMILY MEDICINE

## 2024-04-12 PROCEDURE — 3078F DIAST BP <80 MM HG: CPT | Mod: CPTII,S$GLB,, | Performed by: FAMILY MEDICINE

## 2024-04-12 PROCEDURE — 3066F NEPHROPATHY DOC TX: CPT | Mod: CPTII,S$GLB,, | Performed by: FAMILY MEDICINE

## 2024-04-12 PROCEDURE — 99214 OFFICE O/P EST MOD 30 MIN: CPT | Mod: S$GLB,,, | Performed by: FAMILY MEDICINE

## 2024-04-12 PROCEDURE — 3075F SYST BP GE 130 - 139MM HG: CPT | Mod: CPTII,S$GLB,, | Performed by: FAMILY MEDICINE

## 2024-04-12 PROCEDURE — 3288F FALL RISK ASSESSMENT DOCD: CPT | Mod: CPTII,S$GLB,, | Performed by: FAMILY MEDICINE

## 2024-04-12 PROCEDURE — 3061F NEG MICROALBUMINURIA REV: CPT | Mod: CPTII,S$GLB,, | Performed by: FAMILY MEDICINE

## 2024-04-12 PROCEDURE — 1126F AMNT PAIN NOTED NONE PRSNT: CPT | Mod: CPTII,S$GLB,, | Performed by: FAMILY MEDICINE

## 2024-04-12 NOTE — PROGRESS NOTES
(Portions of this note were dictated using voice recognition software and may contain dictation related errors in spelling/grammar/syntax not found on text review)         Chief Complaint   Patient presents with    Follow-up    Medication Problem     Discuss possible side effects of Januvia       HPI: 64 y.o. male       Diabetes, on metformin  2 pills am/2 pill pm.  glipizide 10 mg twice a day, Jardiance 25 mg daily.  Tried to switch him to weekly G LP 1 agonist with Trulicity but apparently this was not covered and recommendation was to try bydureon, Victoza, or Byetta 1st.  Tried later by to do Bydureon weekly but apparently had some local allergic reaction with injections.  Had advised on potential of Victoza although he did not seem interested in doing a daily injection.  Ended up going to Mounjaro but even at 2.5 mg had significant appetite loss and did not feel well so he got off of it after 1 injection.  Started on Januvia 100 mg daily , A1c improving 7.6 -->7.1 does state that when he started the Januvia he stopped the Jardiance.  He found when he was taking both of these together his sugars were dropping too much.  Was concerned about a cough and whether that was related to Jardiance although did have COVID in January, recovered from this but occasionally gets a cough.  Has found that Tylenol cold and sinus relieves it though.    Does not take aspirin because of ALLERGY.    eye exam : Is due for eye exam.  Will make an appointment.  (Dr. Rapp)  Sees a dentist regularly.    No neuropathy symptoms     CAD:  Presentation march 2017 with angina. CAD on angio, no revasc ability. Medically managed with statin (was on Lipitor 80 but given suboptimal LDL switched over to Crestor 40), plavix, metoprolol suc 25 daily, losartan 100 mg daily, imdur 30 mg daily.  Had a nuclear stress test done last in December 2, 2020. Normal perfusion scan.  EF normal at rest and with stress.  Last cardiology visit was on  11/05/2020     Hypertension on losartan 100 mg daily,metoprolol 50 mg daily,Amlodipine 5 mg daily .        Ulcerative colitis, prior followed by GI. Currently not taking mesalamine (asymptomatic at this time)    Omeprazole PRN for GERD sx    Chronic left knee pain, no recent injury.  No swelling of the knee.  Does get stiff sometimes.  X-ray in June showed calcification along MCL suggesting ligamentous injury.  Medial compartment narrowing  left greater than right, moderate left-sided suprapatellar joint effusion and superior and inferior patellar articular surface spurring.  Had given topical diclofenac gel, had discussed potential physical therapy.   Went to Orthopedics, was given steroid shot which helped.  However has told that he has severe osteoarthritis and will ultimately need a knee replacement.  However doing okay so far.      Mild stable anemia, has had normal B12 testing in the past, had low iron at 1 time.  Is vegetarian but not vegan    Past Medical History:   Diagnosis Date    Coronary artery disease involving native coronary artery of native heart without angina pectoris 7/29/2017    Diabetes mellitus, type II     HTN (hypertension)     Mixed hyperlipidemia 10/24/2019    Ulcerative colitis        Past Surgical History:   Procedure Laterality Date    CATARACT EXTRACTION Bilateral 2013    COLONOSCOPY N/A 1/22/2016    Procedure: COLONOSCOPY;  Surgeon: Aristeo Lynn Jr., MD;  Location: [facility] ENDO;  Service: Endoscopy;  Laterality: N/A;    COLONOSCOPY N/A 10/13/2020    Procedure: COLONOSCOPY;  Surgeon: Charly Travis MD;  Location: [facility];  Service: Endoscopy;  Laterality: N/A;    COLONOSCOPY N/A 5/3/2021    Procedure: COLONOSCOPY;  Surgeon: Charly Travis MD;  Location: [facility];  Service: Endoscopy;  Laterality: N/A;       Family History   Problem Relation Age of Onset    Coronary artery disease Mother 75    Coronary artery disease Father 55    Coronary artery disease Brother 59     Diabetes Brother     Prostate cancer Neg Hx     Colon cancer Neg Hx        Social History     Tobacco Use    Smoking status: Former     Current packs/day: 0.00     Types: Cigarettes     Quit date: 3/16/2008     Years since quittin.0    Smokeless tobacco: Former     Quit date: 2009   Substance Use Topics    Alcohol use: Not Currently     Alcohol/week: 2.0 standard drinks of alcohol     Types: 1 Shots of liquor, 1 Glasses of wine per week     Comment: occasionally    Drug use: No       Lab Results   Component Value Date    WBC 8.73 2024    HGB 12.2 (L) 2024    HCT 36.6 (L) 2024    MCV 85 2024     2024    CHOL 99 (L) 2024    TRIG 100 2024    HDL 35 (L) 2024    ALT 20 2024    AST 17 2024    BILITOT 0.4 2024    ALKPHOS 66 2024     (L) 2024    K 4.4 2024     2024    CREATININE 0.8 2024    ESTGFRAFRICA >60.0 2022    EGFRNONAA >60.0 2022    CALCIUM 9.3 2024    ALBUMIN 3.8 2024    BUN 13 2024    CO2 23 2024    TSH 2.519 2021    PSA 0.50 2024    INR 1.0 2022    HGBA1C 7.1 (H) 2024    MICALBCREAT Unable to calculate 2024    LDLCALC 44.0 (L) 2024     2024       Hemoglobin A1C (%)   Date Value   2024 7.1 (H)   2023 7.6 (H)   2023 7.4 (H)   2023 7.2 (H)   2023 7.8 (H)   2022 7.1 (H)   2022 7.6 (H)   2022 6.9 (H)   2021 7.7 (H)   2021 7.6 (H)   2020 8.9 (H)       Vital signs reviewed  PE:   APPEARANCE: Well nourished, well developed, in no acute distress.    HEAD: Normocephalic, atraumatic.  NECK: Supple with no cervical lymphadenopathy.    CHEST: Good inspiratory effort. Lungs clear to auscultation with no wheezes or crackles.  CARDIOVASCULAR: Normal S1, S2. No rubs, murmurs, or gallops.  ABDOMEN: Bowel sounds normal. Not distended. Soft. No tenderness or  masses. No organomegaly.  EXTREMITIES: No edema   DIABETIC FOOT EXAM: Protective Sensation (w/ 10 gram monofilament):  Right: Intact  Left: Intact    Visual Inspection:  Normal -  Bilateral    Pedal Pulses:   Right: Present  Left: Present    Posterior Tibialis Pulses:   Right:Present  Left: Present                IMPRESSION  Encounter Diagnosis   Name Primary?    Type 2 diabetes mellitus with other specified complication, without long-term current use of insulin Yes                     PLAN  Diabetes:  Continue metformin 2 g daily, glipizide 10 b.i.d.,   sitagliptin 100 mg daily (discussed cough likely not a side effect of the Januvia-can take Claritin or Zyrtec if needed in case of allergy/postnasal drip symptoms).  Had stopped his Jardiance.  Since A1c is improving, can stay off the Jardiance and recheck labs in 3 months to see if any further adjustment as needed    HLD: controlled.     CAD stable, no issues    HTN controlled       UC: no symptoms, no meds, c/scope utd below            F/u 3 mo with labs prior   Orders Placed This Encounter   Procedures    Comprehensive Metabolic Panel    Hemoglobin A1C       HEALTH SCREENINGS   Immunizations:  Pneumovax 2015  zoster:  Up-to-date  Tetanus  22  The COVID vaccine (J/J) 2021.  Pfizer booster on 2021, encourage booster vaccine.  PCV20 utd  Flu utd     Age/Gender Appropriate screenings:  Colonoscopy in  diffuse mildly altered vascular mucosa distal descending colon and rectum and sigmoid colon, 5 mm sigmoid polyp, repeat colonoscopy 2 years.  He will make a follow-up appointment with his GI physician.  Prostate screenin/24  AAA screen negative 2023

## 2024-05-16 ENCOUNTER — OFFICE VISIT (OUTPATIENT)
Dept: FAMILY MEDICINE | Facility: CLINIC | Age: 64
End: 2024-05-16
Payer: MEDICARE

## 2024-05-16 VITALS
WEIGHT: 153.88 LBS | DIASTOLIC BLOOD PRESSURE: 74 MMHG | OXYGEN SATURATION: 98 % | SYSTOLIC BLOOD PRESSURE: 132 MMHG | HEART RATE: 70 BPM | BODY MASS INDEX: 24.84 KG/M2

## 2024-05-16 DIAGNOSIS — E11.9 TYPE 2 DIABETES MELLITUS WITHOUT COMPLICATION, WITHOUT LONG-TERM CURRENT USE OF INSULIN: ICD-10-CM

## 2024-05-16 DIAGNOSIS — I10 ESSENTIAL HYPERTENSION: ICD-10-CM

## 2024-05-16 DIAGNOSIS — E78.2 MIXED HYPERLIPIDEMIA: ICD-10-CM

## 2024-05-16 DIAGNOSIS — I25.119 CORONARY ARTERY DISEASE INVOLVING NATIVE CORONARY ARTERY OF NATIVE HEART WITH ANGINA PECTORIS: ICD-10-CM

## 2024-05-16 DIAGNOSIS — T22.299A: Primary | ICD-10-CM

## 2024-05-16 PROCEDURE — 3078F DIAST BP <80 MM HG: CPT | Mod: CPTII,S$GLB,,

## 2024-05-16 PROCEDURE — 3075F SYST BP GE 130 - 139MM HG: CPT | Mod: CPTII,S$GLB,,

## 2024-05-16 PROCEDURE — 1159F MED LIST DOCD IN RCRD: CPT | Mod: CPTII,S$GLB,,

## 2024-05-16 PROCEDURE — 1101F PT FALLS ASSESS-DOCD LE1/YR: CPT | Mod: CPTII,S$GLB,,

## 2024-05-16 PROCEDURE — 3288F FALL RISK ASSESSMENT DOCD: CPT | Mod: CPTII,S$GLB,,

## 2024-05-16 PROCEDURE — 3061F NEG MICROALBUMINURIA REV: CPT | Mod: CPTII,S$GLB,,

## 2024-05-16 PROCEDURE — 3066F NEPHROPATHY DOC TX: CPT | Mod: CPTII,S$GLB,,

## 2024-05-16 PROCEDURE — 3051F HG A1C>EQUAL 7.0%<8.0%: CPT | Mod: CPTII,S$GLB,,

## 2024-05-16 PROCEDURE — 99999 PR PBB SHADOW E&M-EST. PATIENT-LVL V: CPT | Mod: PBBFAC,,,

## 2024-05-16 PROCEDURE — 4010F ACE/ARB THERAPY RXD/TAKEN: CPT | Mod: CPTII,S$GLB,,

## 2024-05-16 PROCEDURE — 99214 OFFICE O/P EST MOD 30 MIN: CPT | Mod: S$GLB,,,

## 2024-05-16 PROCEDURE — 1125F AMNT PAIN NOTED PAIN PRSNT: CPT | Mod: CPTII,S$GLB,,

## 2024-05-16 PROCEDURE — 3008F BODY MASS INDEX DOCD: CPT | Mod: CPTII,S$GLB,,
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
## 2024-05-21 DIAGNOSIS — I25.119 CORONARY ARTERY DISEASE INVOLVING NATIVE CORONARY ARTERY OF NATIVE HEART WITH ANGINA PECTORIS: ICD-10-CM

## 2024-05-21 RX ORDER — CLOPIDOGREL BISULFATE 75 MG/1
TABLET ORAL
Qty: 90 TABLET | Refills: 0 | Status: SHIPPED | OUTPATIENT
Start: 2024-05-21

## 2024-06-07 RX ORDER — GLIPIZIDE 10 MG/1
TABLET ORAL
Qty: 180 TABLET | Refills: 1 | Status: SHIPPED | OUTPATIENT
Start: 2024-06-07

## 2024-06-07 RX ORDER — LOSARTAN POTASSIUM 100 MG/1
TABLET ORAL
Qty: 90 TABLET | Refills: 3 | Status: SHIPPED | OUTPATIENT
Start: 2024-06-07

## 2024-06-07 NOTE — TELEPHONE ENCOUNTER
No care due was identified.  [facility] Embedded Care Due Messages. Reference number: [id number].   6/07/2024 1:03:35 PM CDT

## 2024-07-03 RX ORDER — METOPROLOL SUCCINATE 50 MG/1
TABLET, EXTENDED RELEASE ORAL
Qty: 90 TABLET | Refills: 3 | Status: SHIPPED | OUTPATIENT
Start: 2024-07-03

## 2024-07-03 NOTE — TELEPHONE ENCOUNTER
Refill Decision Note   Michoacano Lelo  is requesting a refill authorization.  Brief Assessment and Rationale for Refill:  Approve     Medication Therapy Plan:         Comments:     Note composed:4:57 PM 07/03/2024             Mastoid Interpolation Flap Text: A decision was made to reconstruct the defect utilizing an interpolation axial flap and a staged reconstruction.  A telfa template was made of the defect.  This telfa template was then used to outline the mastoid interpolation flap.  The donor area for the pedicle flap was then injected with anesthesia.  The flap was excised through the skin and subcutaneous tissue down to the layer of the underlying musculature.  The pedicle flap was carefully excised within this deep plane to maintain its blood supply.  The edges of the donor site were undermined.   The donor site was closed in a primary fashion.  The pedicle was then rotated into position and sutured.  Once the tube was sutured into place, adequate blood supply was confirmed with blanching and refill.  The pedicle was then wrapped with xeroform gauze and dressed appropriately with a telfa and gauze bandage to ensure continued blood supply and protect the attached pedicle.

## 2024-07-12 ENCOUNTER — LAB VISIT (OUTPATIENT)
Dept: LAB | Facility: HOSPITAL | Age: 64
End: 2024-07-12
Attending: FAMILY MEDICINE
Payer: MEDICARE

## 2024-07-12 DIAGNOSIS — E11.69 TYPE 2 DIABETES MELLITUS WITH OTHER SPECIFIED COMPLICATION, WITHOUT LONG-TERM CURRENT USE OF INSULIN: ICD-10-CM

## 2024-07-12 PROCEDURE — 83036 HEMOGLOBIN GLYCOSYLATED A1C: CPT | Performed by: FAMILY MEDICINE

## 2024-07-12 PROCEDURE — 80053 COMPREHEN METABOLIC PANEL: CPT | Performed by: FAMILY MEDICINE

## 2024-07-12 PROCEDURE — 36415 COLL VENOUS BLD VENIPUNCTURE: CPT | Mod: PN | Performed by: FAMILY MEDICINE

## 2024-07-13 LAB
ALBUMIN SERPL BCP-MCNC: 3.9 G/DL (ref 3.5–5.2)
ALP SERPL-CCNC: 62 U/L (ref 55–135)
ALT SERPL W/O P-5'-P-CCNC: 20 U/L (ref 10–44)
ANION GAP SERPL CALC-SCNC: 8 MMOL/L (ref 8–16)
AST SERPL-CCNC: 15 U/L (ref 10–40)
BILIRUB SERPL-MCNC: 0.5 MG/DL (ref 0.1–1)
BUN SERPL-MCNC: 10 MG/DL (ref 8–23)
CALCIUM SERPL-MCNC: 9.1 MG/DL (ref 8.7–10.5)
CHLORIDE SERPL-SCNC: 105 MMOL/L (ref 95–110)
CO2 SERPL-SCNC: 23 MMOL/L (ref 23–29)
CREAT SERPL-MCNC: 0.8 MG/DL (ref 0.5–1.4)
EST. GFR  (NO RACE VARIABLE): >60 ML/MIN/1.73 M^2
ESTIMATED AVG GLUCOSE: 154 MG/DL (ref 68–131)
GLUCOSE SERPL-MCNC: 111 MG/DL (ref 70–110)
HBA1C MFR BLD: 7 % (ref 4–5.6)
POTASSIUM SERPL-SCNC: 4.5 MMOL/L (ref 3.5–5.1)
PROT SERPL-MCNC: 7.3 G/DL (ref 6–8.4)
SODIUM SERPL-SCNC: 136 MMOL/L (ref 136–145)

## 2024-07-15 ENCOUNTER — OFFICE VISIT (OUTPATIENT)
Dept: FAMILY MEDICINE | Facility: CLINIC | Age: 64
End: 2024-07-15
Payer: MEDICARE

## 2024-07-15 VITALS
SYSTOLIC BLOOD PRESSURE: 128 MMHG | BODY MASS INDEX: 24.55 KG/M2 | HEART RATE: 80 BPM | OXYGEN SATURATION: 98 % | DIASTOLIC BLOOD PRESSURE: 64 MMHG | WEIGHT: 152.75 LBS | TEMPERATURE: 98 F | HEIGHT: 66 IN

## 2024-07-15 DIAGNOSIS — I10 ESSENTIAL HYPERTENSION: Primary | ICD-10-CM

## 2024-07-15 DIAGNOSIS — E78.2 MIXED HYPERLIPIDEMIA: ICD-10-CM

## 2024-07-15 DIAGNOSIS — E11.9 TYPE 2 DIABETES MELLITUS WITHOUT COMPLICATION, WITHOUT LONG-TERM CURRENT USE OF INSULIN: ICD-10-CM

## 2024-07-15 DIAGNOSIS — I25.119 CORONARY ARTERY DISEASE INVOLVING NATIVE CORONARY ARTERY OF NATIVE HEART WITH ANGINA PECTORIS: ICD-10-CM

## 2024-07-15 LAB
LEFT EYE DM RETINOPATHY: NEGATIVE
RIGHT EYE DM RETINOPATHY: NEGATIVE

## 2024-07-15 PROCEDURE — 1101F PT FALLS ASSESS-DOCD LE1/YR: CPT | Mod: CPTII,S$GLB,, | Performed by: FAMILY MEDICINE

## 2024-07-15 PROCEDURE — 3288F FALL RISK ASSESSMENT DOCD: CPT | Mod: CPTII,S$GLB,, | Performed by: FAMILY MEDICINE

## 2024-07-15 PROCEDURE — 3078F DIAST BP <80 MM HG: CPT | Mod: CPTII,S$GLB,, | Performed by: FAMILY MEDICINE

## 2024-07-15 PROCEDURE — G2211 COMPLEX E/M VISIT ADD ON: HCPCS | Mod: S$GLB,,, | Performed by: FAMILY MEDICINE

## 2024-07-15 PROCEDURE — 1159F MED LIST DOCD IN RCRD: CPT | Mod: CPTII,S$GLB,, | Performed by: FAMILY MEDICINE

## 2024-07-15 PROCEDURE — 3051F HG A1C>EQUAL 7.0%<8.0%: CPT | Mod: CPTII,S$GLB,, | Performed by: FAMILY MEDICINE

## 2024-07-15 PROCEDURE — 3066F NEPHROPATHY DOC TX: CPT | Mod: CPTII,S$GLB,, | Performed by: FAMILY MEDICINE

## 2024-07-15 PROCEDURE — 99999 PR PBB SHADOW E&M-EST. PATIENT-LVL IV: CPT | Mod: PBBFAC,,, | Performed by: FAMILY MEDICINE

## 2024-07-15 PROCEDURE — 3061F NEG MICROALBUMINURIA REV: CPT | Mod: CPTII,S$GLB,, | Performed by: FAMILY MEDICINE

## 2024-07-15 PROCEDURE — 4010F ACE/ARB THERAPY RXD/TAKEN: CPT | Mod: CPTII,S$GLB,, | Performed by: FAMILY MEDICINE

## 2024-07-15 PROCEDURE — 3008F BODY MASS INDEX DOCD: CPT | Mod: CPTII,S$GLB,, | Performed by: FAMILY MEDICINE

## 2024-07-15 PROCEDURE — 1126F AMNT PAIN NOTED NONE PRSNT: CPT | Mod: CPTII,S$GLB,, | Performed by: FAMILY MEDICINE

## 2024-07-15 PROCEDURE — 3074F SYST BP LT 130 MM HG: CPT | Mod: CPTII,S$GLB,, | Performed by: FAMILY MEDICINE

## 2024-07-15 PROCEDURE — 99214 OFFICE O/P EST MOD 30 MIN: CPT | Mod: S$GLB,,, | Performed by: FAMILY MEDICINE

## 2024-07-15 NOTE — PROGRESS NOTES
(Portions of this note were dictated using voice recognition software and may contain dictation related errors in spelling/grammar/syntax not found on text review)         Chief Complaint   Patient presents with    Follow-up       HPI: 64 y.o. male Last visit April 20, 2024      Diabetes, on metformin  2 pills am/2 pill pm.  glipizide 10 mg twice a day, Januvia 100 mg daily.  Was on Jardiance 25 mg daily but states that we need to Januvia and Jardiance together his sugars drop too much.  Tried to switch him to weekly G LP 1 agonist with Trulicity but apparently this was not covered and recommendation was to try bydureon, Victoza, or Byetta 1st.  Tried later   Bydureon weekly but apparently had some local allergic reaction with injections.  Had advised on potential of Victoza although he did not seem interested in doing a daily injection.  Ended up going to Mounjaro but even at 2.5 mg had significant appetite loss and did not feel well so he got off of it after 1 injection.      A1c 7.6 - 7.1 - 7.0    Does not take aspirin because of ALLERGY.    eye exam :  Eye exam coming up today  (Dr. Rapp)  Sees a dentist regularly.    No neuropathy symptoms     CAD:  Presentation march 2017 with angina. CAD on angio, no revasculariability. Medically managed with statin (was on Lipitor 80 but given suboptimal LDL switched over to Crestor 40), plavix, metoprolol suc 25 daily, losartan 100 mg daily, imdur 30 mg daily.  Had a nuclear stress test done last in December 2, 2020. Normal perfusion scan.  EF normal at rest and with stress.  Last cardiology visit was on 11/05/2020     Hypertension on losartan 100 mg daily,metoprolol 50 mg daily,Amlodipine 5 mg daily .        Ulcerative colitis, prior followed by GI. Currently not taking mesalamine (asymptomatic at this time)    Omeprazole PRN for GERD sx    Chronic left knee pain, no recent injury.  No swelling of the knee.  Does get stiff sometimes.  X-ray in June showed  calcification along MCL suggesting ligamentous injury.  Medial compartment narrowing  left greater than right, moderate left-sided suprapatellar joint effusion and superior and inferior patellar articular surface spurring.  Had given topical diclofenac gel, had discussed potential physical therapy.   Went to Orthopedics, was given steroid shot which helped.  However has told that he has severe osteoarthritis and will ultimately need a knee replacement.  However doing okay so far.      Mild stable anemia, has had normal B12 testing in the past, had low iron at 1 time.  Is vegetarian but not vegan    Does get some chronic sneezing and nasal congestion and rhinorrhea but has been taking Claritin and this has been helping.      Past Medical History:   Diagnosis Date    Coronary artery disease involving native coronary artery of native heart without angina pectoris 7/29/2017    Diabetes mellitus, type II     HTN (hypertension)     Mixed hyperlipidemia 10/24/2019    Ulcerative colitis        Past Surgical History:   Procedure Laterality Date    CATARACT EXTRACTION Bilateral 2013    COLONOSCOPY N/A 1/22/2016    Procedure: COLONOSCOPY;  Surgeon: Aristeo Lynn Jr., MD;  Location: [facility] ENDO;  Service: Endoscopy;  Laterality: N/A;    COLONOSCOPY N/A 10/13/2020    Procedure: COLONOSCOPY;  Surgeon: Charly Travis MD;  Location: [facility];  Service: Endoscopy;  Laterality: N/A;    COLONOSCOPY N/A 5/3/2021    Procedure: COLONOSCOPY;  Surgeon: Charly Travis MD;  Location: [facility];  Service: Endoscopy;  Laterality: N/A;       Family History   Problem Relation Name Age of Onset    Coronary artery disease Mother  75    Coronary artery disease Father  55    Coronary artery disease Brother  59    Diabetes Brother      Prostate cancer Neg Hx      Colon cancer Neg Hx         Social History     Tobacco Use    Smoking status: Former     Current packs/day: 0.00     Types: Cigarettes     Quit date: 3/16/2008     Years since  quittin.3    Smokeless tobacco: Former     Quit date: 2009   Substance Use Topics    Alcohol use: Not Currently     Alcohol/week: 2.0 standard drinks of alcohol     Types: 1 Shots of liquor, 1 Glasses of wine per week     Comment: occasionally    Drug use: No       Lab Results   Component Value Date    WBC 8.73 2024    HGB 12.2 (L) 2024    HCT 36.6 (L) 2024    MCV 85 2024     2024    CHOL 99 (L) 2024    TRIG 100 2024    HDL 35 (L) 2024    ALT 20 2024    AST 15 2024    BILITOT 0.5 2024    ALKPHOS 62 2024     2024    K 4.5 2024     2024    CREATININE 0.8 2024    ESTGFRAFRICA >60.0 2022    EGFRNONAA >60.0 2022    CALCIUM 9.1 2024    ALBUMIN 3.9 2024    BUN 10 2024    CO2 23 2024    TSH 2.519 2021    PSA 0.50 2024    INR 1.0 2022    HGBA1C 7.0 (H) 2024    MICALBCREAT Unable to calculate 2024    LDLCALC 44.0 (L) 2024     (H) 2024       Hemoglobin A1C (%)   Date Value   2024 7.0 (H)   2024 7.1 (H)   2023 7.6 (H)   2023 7.4 (H)   2023 7.2 (H)   2023 7.8 (H)   2022 7.1 (H)   2022 7.6 (H)   2022 6.9 (H)   2021 7.7 (H)   2021 7.6 (H)       Vital signs reviewed  PE:   APPEARANCE: Well nourished, well developed, in no acute distress.    HEAD: Normocephalic, atraumatic.  NECK: Supple with no cervical lymphadenopathy.    CHEST: Good inspiratory effort. Lungs clear to auscultation with no wheezes or crackles.  CARDIOVASCULAR: Normal S1, S2. No rubs, murmurs, or gallops.  ABDOMEN: Bowel sounds normal. Not distended. Soft. No tenderness or masses. No organomegaly.  EXTREMITIES: No edema   DIABETIC FOOT EXAM: 2024                IMPRESSION  Encounter Diagnoses   Name Primary?    Essential hypertension Yes    Coronary artery disease involving native coronary  artery of native heart with angina pectoris     Mixed hyperlipidemia     Type 2 diabetes mellitus without complication, without long-term current use of insulin                        PLAN  Diabetes:  Continue metformin 2 g daily, glipizide 10 b.i.d., sitagliptin 100 mg daily .  Had stopped his Jardiance.  Since A1c is improving, can stay off the Jardiance and recheck labs in 3 months to see if any further adjustment as needed    HLD: controlled.     CAD stable, no issues    HTN controlled       UC: no symptoms, no meds, make sure to follow up with GI for repeat scope as below    Okay for Claritin for AR     Follow up for eye exam later today.        F/u 3 mo with labs prior   Orders Placed This Encounter   Procedures    CBC Auto Differential    Comprehensive Metabolic Panel    Lipid Panel    Hemoglobin A1C       HEALTH SCREENINGS   Immunizations:  Pneumovax   zoster:  Up-to-date  Tetanus  22  COVID vaccine (J/J) 2021.  Pfizer booster on 2021, encourage booster vaccine.  PCV20 utd  Flu utd     Age/Gender Appropriate screenings:  Colonoscopy in  diffuse mildly altered vascular mucosa distal descending colon and rectum and sigmoid colon, 5 mm sigmoid polyp, repeat colonoscopy 2 years.  He will make a follow-up appointment with his GI physician.  Prostate screenin/24  AAA screen negative 2023

## 2024-07-19 ENCOUNTER — PATIENT OUTREACH (OUTPATIENT)
Dept: ADMINISTRATIVE | Facility: HOSPITAL | Age: 64
End: 2024-07-19
Payer: MEDICARE

## 2024-08-15 DIAGNOSIS — I25.119 CORONARY ARTERY DISEASE INVOLVING NATIVE CORONARY ARTERY OF NATIVE HEART WITH ANGINA PECTORIS: ICD-10-CM

## 2024-08-15 RX ORDER — CLOPIDOGREL BISULFATE 75 MG/1
TABLET ORAL
Qty: 90 TABLET | Refills: 0 | Status: SHIPPED | OUTPATIENT
Start: 2024-08-15

## 2024-08-15 RX ORDER — METFORMIN HYDROCHLORIDE 500 MG/1
TABLET, EXTENDED RELEASE ORAL
Qty: 360 TABLET | Refills: 1 | Status: SHIPPED | OUTPATIENT
Start: 2024-08-15

## 2024-08-15 NOTE — TELEPHONE ENCOUNTER
Refill Decision Note   Michoacano Lind  is requesting a refill authorization.  Brief Assessment and Rationale for Refill:  Approve     Medication Therapy Plan:        Pharmacist review requested: Yes   Comments:     Note composed:3:04 PM 08/15/2024

## 2024-08-15 NOTE — TELEPHONE ENCOUNTER
Addended by: Marialuisa Gutierrez on: 11/24/2023 10:49 AM     Modules accepted: Orders Please see the attached refill request. Plavix rx

## 2024-08-15 NOTE — TELEPHONE ENCOUNTER
No care due was identified.  [facility] Embedded Care Due Messages. Reference number: [id number].   8/15/2024 1:39:34 PM CDT

## 2024-08-15 NOTE — TELEPHONE ENCOUNTER
Refill Routing Note   Medication(s) are not appropriate for processing by [facility] for the following reason(s):        Drug-disease interaction    [facility] action(s):  Defer        Medication Therapy Plan: metFORMIN and Colitis; Idiopathic ulcerative colitis    Pharmacist review requested: Yes     Appointments  past 12m or future 3m with PCP    Date Provider   Last Visit   7/15/2024 Facundo Alexis MD   Next Visit   10/15/2024 Facundo Alexis MD   ED visits in past 90 days: 0        Note composed:1:47 PM 08/15/2024

## 2024-09-30 RX ORDER — AMLODIPINE BESYLATE 5 MG/1
TABLET ORAL
Qty: 90 TABLET | Refills: 3 | Status: SHIPPED | OUTPATIENT
Start: 2024-09-30

## 2024-09-30 NOTE — TELEPHONE ENCOUNTER
No care due was identified.  [facility] Embedded Care Due Messages. Reference number: [id number].   9/30/2024 10:58:40 AM CDT

## 2024-09-30 NOTE — TELEPHONE ENCOUNTER
Refill Decision Note   Michoacano Lind  is requesting a refill authorization.  Brief Assessment and Rationale for Refill:  Approve     Medication Therapy Plan:         Comments:     Note composed:5:43 PM 09/30/2024             Appointments     Last Visit   7/15/2024 Facundo Alexis MD   Next Visit   10/15/2024 Facundo Alexis MD

## 2024-10-08 ENCOUNTER — LAB VISIT (OUTPATIENT)
Dept: LAB | Facility: HOSPITAL | Age: 64
End: 2024-10-08
Attending: FAMILY MEDICINE
Payer: MEDICARE

## 2024-10-08 DIAGNOSIS — I10 ESSENTIAL HYPERTENSION: ICD-10-CM

## 2024-10-08 DIAGNOSIS — E11.9 TYPE 2 DIABETES MELLITUS WITHOUT COMPLICATION, WITHOUT LONG-TERM CURRENT USE OF INSULIN: ICD-10-CM

## 2024-10-08 DIAGNOSIS — E78.2 MIXED HYPERLIPIDEMIA: ICD-10-CM

## 2024-10-08 DIAGNOSIS — I25.119 CORONARY ARTERY DISEASE INVOLVING NATIVE CORONARY ARTERY OF NATIVE HEART WITH ANGINA PECTORIS: ICD-10-CM

## 2024-10-08 LAB
ALBUMIN SERPL BCP-MCNC: 3.8 G/DL (ref 3.5–5.2)
ALP SERPL-CCNC: 68 U/L (ref 55–135)
ALT SERPL W/O P-5'-P-CCNC: 14 U/L (ref 10–44)
ANION GAP SERPL CALC-SCNC: 8 MMOL/L (ref 8–16)
AST SERPL-CCNC: 15 U/L (ref 10–40)
BASOPHILS # BLD AUTO: 0.08 K/UL (ref 0–0.2)
BASOPHILS NFR BLD: 0.9 % (ref 0–1.9)
BILIRUB SERPL-MCNC: 0.5 MG/DL (ref 0.1–1)
BUN SERPL-MCNC: 9 MG/DL (ref 8–23)
CALCIUM SERPL-MCNC: 9.3 MG/DL (ref 8.7–10.5)
CHLORIDE SERPL-SCNC: 105 MMOL/L (ref 95–110)
CHOLEST SERPL-MCNC: 149 MG/DL (ref 120–199)
CHOLEST/HDLC SERPL: 4 {RATIO} (ref 2–5)
CO2 SERPL-SCNC: 23 MMOL/L (ref 23–29)
CREAT SERPL-MCNC: 0.8 MG/DL (ref 0.5–1.4)
DIFFERENTIAL METHOD BLD: ABNORMAL
EOSINOPHIL # BLD AUTO: 0.9 K/UL (ref 0–0.5)
EOSINOPHIL NFR BLD: 10.3 % (ref 0–8)
ERYTHROCYTE [DISTWIDTH] IN BLOOD BY AUTOMATED COUNT: 13 % (ref 11.5–14.5)
EST. GFR  (NO RACE VARIABLE): >60 ML/MIN/1.73 M^2
ESTIMATED AVG GLUCOSE: 169 MG/DL (ref 68–131)
GLUCOSE SERPL-MCNC: 111 MG/DL (ref 70–110)
HBA1C MFR BLD: 7.5 % (ref 4–5.6)
HCT VFR BLD AUTO: 36.1 % (ref 40–54)
HDLC SERPL-MCNC: 37 MG/DL (ref 40–75)
HDLC SERPL: 24.8 % (ref 20–50)
HGB BLD-MCNC: 12 G/DL (ref 14–18)
IMM GRANULOCYTES # BLD AUTO: 0.03 K/UL (ref 0–0.04)
IMM GRANULOCYTES NFR BLD AUTO: 0.3 % (ref 0–0.5)
LDLC SERPL CALC-MCNC: 84.8 MG/DL (ref 63–159)
LYMPHOCYTES # BLD AUTO: 2.4 K/UL (ref 1–4.8)
LYMPHOCYTES NFR BLD: 26.8 % (ref 18–48)
MCH RBC QN AUTO: 28.1 PG (ref 27–31)
MCHC RBC AUTO-ENTMCNC: 33.2 G/DL (ref 32–36)
MCV RBC AUTO: 85 FL (ref 82–98)
MONOCYTES # BLD AUTO: 0.6 K/UL (ref 0.3–1)
MONOCYTES NFR BLD: 7 % (ref 4–15)
NEUTROPHILS # BLD AUTO: 4.9 K/UL (ref 1.8–7.7)
NEUTROPHILS NFR BLD: 54.7 % (ref 38–73)
NONHDLC SERPL-MCNC: 112 MG/DL
NRBC BLD-RTO: 0 /100 WBC
PLATELET # BLD AUTO: 247 K/UL (ref 150–450)
PMV BLD AUTO: 11 FL (ref 9.2–12.9)
POTASSIUM SERPL-SCNC: 4.3 MMOL/L (ref 3.5–5.1)
PROT SERPL-MCNC: 7.1 G/DL (ref 6–8.4)
RBC # BLD AUTO: 4.27 M/UL (ref 4.6–6.2)
SODIUM SERPL-SCNC: 136 MMOL/L (ref 136–145)
TRIGL SERPL-MCNC: 136 MG/DL (ref 30–150)
WBC # BLD AUTO: 8.99 K/UL (ref 3.9–12.7)

## 2024-10-08 PROCEDURE — 80053 COMPREHEN METABOLIC PANEL: CPT | Performed by: FAMILY MEDICINE

## 2024-10-08 PROCEDURE — 85025 COMPLETE CBC W/AUTO DIFF WBC: CPT | Performed by: FAMILY MEDICINE

## 2024-10-08 PROCEDURE — 83036 HEMOGLOBIN GLYCOSYLATED A1C: CPT | Performed by: FAMILY MEDICINE

## 2024-10-08 PROCEDURE — 36415 COLL VENOUS BLD VENIPUNCTURE: CPT | Mod: PN | Performed by: FAMILY MEDICINE

## 2024-10-08 PROCEDURE — 80061 LIPID PANEL: CPT | Performed by: FAMILY MEDICINE

## 2024-10-15 ENCOUNTER — OFFICE VISIT (OUTPATIENT)
Dept: FAMILY MEDICINE | Facility: CLINIC | Age: 64
End: 2024-10-15
Payer: MEDICARE

## 2024-10-15 VITALS
WEIGHT: 155.44 LBS | OXYGEN SATURATION: 98 % | DIASTOLIC BLOOD PRESSURE: 70 MMHG | SYSTOLIC BLOOD PRESSURE: 132 MMHG | TEMPERATURE: 98 F | HEART RATE: 69 BPM | HEIGHT: 66 IN | BODY MASS INDEX: 24.98 KG/M2

## 2024-10-15 DIAGNOSIS — Z79.899 OTHER LONG TERM (CURRENT) DRUG THERAPY: ICD-10-CM

## 2024-10-15 DIAGNOSIS — E78.2 MIXED HYPERLIPIDEMIA: ICD-10-CM

## 2024-10-15 DIAGNOSIS — Z23 NEEDS FLU SHOT: ICD-10-CM

## 2024-10-15 DIAGNOSIS — I25.119 CORONARY ARTERY DISEASE INVOLVING NATIVE CORONARY ARTERY OF NATIVE HEART WITH ANGINA PECTORIS: Primary | ICD-10-CM

## 2024-10-15 DIAGNOSIS — I10 ESSENTIAL HYPERTENSION: ICD-10-CM

## 2024-10-15 DIAGNOSIS — E11.69 TYPE 2 DIABETES MELLITUS WITH OTHER SPECIFIED COMPLICATION, WITHOUT LONG-TERM CURRENT USE OF INSULIN: ICD-10-CM

## 2024-10-15 DIAGNOSIS — K21.9 GASTROESOPHAGEAL REFLUX DISEASE, UNSPECIFIED WHETHER ESOPHAGITIS PRESENT: ICD-10-CM

## 2024-10-15 DIAGNOSIS — D64.9 ANEMIA, UNSPECIFIED TYPE: ICD-10-CM

## 2024-10-15 PROCEDURE — 4010F ACE/ARB THERAPY RXD/TAKEN: CPT | Mod: CPTII,S$GLB,, | Performed by: FAMILY MEDICINE

## 2024-10-15 PROCEDURE — 3288F FALL RISK ASSESSMENT DOCD: CPT | Mod: CPTII,S$GLB,, | Performed by: FAMILY MEDICINE

## 2024-10-15 PROCEDURE — 1159F MED LIST DOCD IN RCRD: CPT | Mod: CPTII,S$GLB,, | Performed by: FAMILY MEDICINE

## 2024-10-15 PROCEDURE — 3066F NEPHROPATHY DOC TX: CPT | Mod: CPTII,S$GLB,, | Performed by: FAMILY MEDICINE

## 2024-10-15 PROCEDURE — 3061F NEG MICROALBUMINURIA REV: CPT | Mod: CPTII,S$GLB,, | Performed by: FAMILY MEDICINE

## 2024-10-15 PROCEDURE — 3008F BODY MASS INDEX DOCD: CPT | Mod: CPTII,S$GLB,, | Performed by: FAMILY MEDICINE

## 2024-10-15 PROCEDURE — 3075F SYST BP GE 130 - 139MM HG: CPT | Mod: CPTII,S$GLB,, | Performed by: FAMILY MEDICINE

## 2024-10-15 PROCEDURE — 1160F RVW MEDS BY RX/DR IN RCRD: CPT | Mod: CPTII,S$GLB,, | Performed by: FAMILY MEDICINE

## 2024-10-15 PROCEDURE — G0008 ADMIN INFLUENZA VIRUS VAC: HCPCS | Mod: S$GLB,,, | Performed by: FAMILY MEDICINE

## 2024-10-15 PROCEDURE — 3078F DIAST BP <80 MM HG: CPT | Mod: CPTII,S$GLB,, | Performed by: FAMILY MEDICINE

## 2024-10-15 PROCEDURE — 90653 IIV ADJUVANT VACCINE IM: CPT | Mod: S$GLB,,, | Performed by: FAMILY MEDICINE

## 2024-10-15 PROCEDURE — 3051F HG A1C>EQUAL 7.0%<8.0%: CPT | Mod: CPTII,S$GLB,, | Performed by: FAMILY MEDICINE

## 2024-10-15 PROCEDURE — 1125F AMNT PAIN NOTED PAIN PRSNT: CPT | Mod: CPTII,S$GLB,, | Performed by: FAMILY MEDICINE

## 2024-10-15 PROCEDURE — 99214 OFFICE O/P EST MOD 30 MIN: CPT | Mod: 25,S$GLB,, | Performed by: FAMILY MEDICINE

## 2024-10-15 PROCEDURE — 1101F PT FALLS ASSESS-DOCD LE1/YR: CPT | Mod: CPTII,S$GLB,, | Performed by: FAMILY MEDICINE

## 2024-10-15 PROCEDURE — 99999 PR PBB SHADOW E&M-EST. PATIENT-LVL IV: CPT | Mod: PBBFAC,,, | Performed by: FAMILY MEDICINE

## 2024-10-15 PROCEDURE — 2023F DILAT RTA XM W/O RTNOPTHY: CPT | Mod: CPTII,S$GLB,, | Performed by: FAMILY MEDICINE

## 2024-10-15 RX ORDER — FAMOTIDINE 20 MG/1
20 TABLET, FILM COATED ORAL 2 TIMES DAILY PRN
Qty: 60 TABLET | Refills: 11 | Status: SHIPPED | OUTPATIENT
Start: 2024-10-15 | End: 2025-10-15

## 2024-10-15 NOTE — PROGRESS NOTES
(Portions of this note were dictated using voice recognition software and may contain dictation related errors in spelling/grammar/syntax not found on text review)         Chief Complaint   Patient presents with    Follow-up       HPI: 64 y.o. male Last visit July of 2024      Diabetes, on metformin  2 pills am/2 pill pm.  glipizide 10 mg twice a day, Januvia 100 mg daily.  Was on Jardiance 25 mg daily but states that when taking Januvia and Jardiance together his sugars were dropping too much.  Tried to switch him to weekly G LP 1 agonist with Trulicity but apparently this was not covered and recommendation was to try bydureon, Victoza, or Byetta 1st.  Tried later   Bydureon weekly but apparently had some local allergic reaction with injections.  Had advised on potential of Victoza although he did not seem interested in doing a daily injection.  Ended up going to Mounjaro but even at 2.5 mg had significant appetite loss and did not feel well so he got off of it after 1 injection.      A1c 7.6 - 7.1 - 7.0-7.5    Checks his sugar every morning, many times well-controlled in the 80s or low 100 readings.  He had a couple of episodes of 70 where he felt hypoglycemic with sweating and he had to eat some sugar.  He did have some issues in the last month for his blood sugar was on characteristically in the 150s in the morning.  Does not feel like anything was really different with diet although perhaps maybe eating later than usual.    Does not take aspirin because of ALLERGY.    eye exam :   (Dr. Rapp)  Sees a dentist regularly.    No neuropathy symptoms     CAD:  Presentation march 2017 with angina. CAD on angio, no revasculariability. Medically managed with statin (was on Lipitor 80 but given suboptimal LDL switched over to Crestor 40), plavix, metoprolol suc 25 daily, losartan 100 mg daily, imdur 30 mg daily.  Had a nuclear stress test done last in December 2, 2020. Normal perfusion scan.  EF normal at  rest and with stress.  Last cardiology visit was on 11/05/2020     Hypertension on losartan 100 mg daily,metoprolol 50 mg daily,Amlodipine 5 mg daily .        Ulcerative colitis, prior followed by GI. Currently not taking mesalamine (asymptomatic at this time)    Omeprazole PRN for GERD sx    Chronic left knee pain, no recent injury.  No swelling of the knee.  Does get stiff sometimes.  X-ray in June showed calcification along MCL suggesting ligamentous injury.  Medial compartment narrowing  left greater than right, moderate left-sided suprapatellar joint effusion and superior and inferior patellar articular surface spurring.  Had given topical diclofenac gel, had discussed potential physical therapy.   Went to Orthopedics, was given steroid shot which helped.  However has told that he has severe osteoarthritis and will ultimately need a knee replacement.  However doing okay so far.      Mild stable anemia, has had normal B12 testing in the past, had low iron at 1 time.  Is vegetarian but not vegan    Does get some chronic sneezing and nasal congestion and rhinorrhea but has been taking Claritin and this has been helping.      Past Medical History:   Diagnosis Date    Coronary artery disease involving native coronary artery of native heart without angina pectoris 7/29/2017    Diabetes mellitus, type II     HTN (hypertension)     Mixed hyperlipidemia 10/24/2019    Ulcerative colitis        Past Surgical History:   Procedure Laterality Date    CATARACT EXTRACTION Bilateral 2013    COLONOSCOPY N/A 1/22/2016    Procedure: COLONOSCOPY;  Surgeon: Aristeo Lynn Jr., MD;  Location: [facility];  Service: Endoscopy;  Laterality: N/A;    COLONOSCOPY N/A 10/13/2020    Procedure: COLONOSCOPY;  Surgeon: Charly Travis MD;  Location: [facility];  Service: Endoscopy;  Laterality: N/A;    COLONOSCOPY N/A 5/3/2021    Procedure: COLONOSCOPY;  Surgeon: Charly Travis MD;  Location: [facility];  Service: Endoscopy;   Laterality: N/A;       Family History   Problem Relation Name Age of Onset    Coronary artery disease Mother  75    Coronary artery disease Father  55    Coronary artery disease Brother  59    Diabetes Brother      Prostate cancer Neg Hx      Colon cancer Neg Hx         Social History     Tobacco Use    Smoking status: Former     Current packs/day: 0.00     Types: Cigarettes     Quit date: 3/16/2008     Years since quittin.5    Smokeless tobacco: Former     Quit date: 2009   Substance Use Topics    Alcohol use: Not Currently     Alcohol/week: 2.0 standard drinks of alcohol     Types: 1 Shots of liquor, 1 Glasses of wine per week     Comment: occasionally    Drug use: No       Lab Results   Component Value Date    WBC 8.99 10/08/2024    HGB 12.0 (L) 10/08/2024    HCT 36.1 (L) 10/08/2024    MCV 85 10/08/2024     10/08/2024    CHOL 149 10/08/2024    TRIG 136 10/08/2024    HDL 37 (L) 10/08/2024    ALT 14 10/08/2024    AST 15 10/08/2024    BILITOT 0.5 10/08/2024    ALKPHOS 68 10/08/2024     10/08/2024    K 4.3 10/08/2024     10/08/2024    CREATININE 0.8 10/08/2024    ESTGFRAFRICA >60.0 2022    EGFRNONAA >60.0 2022    CALCIUM 9.3 10/08/2024    ALBUMIN 3.8 10/08/2024    BUN 9 10/08/2024    CO2 23 10/08/2024    TSH 2.519 2021    PSA 0.50 2024    INR 1.0 2022    HGBA1C 7.5 (H) 10/08/2024    MICALBCREAT Unable to calculate 2024    LDLCALC 84.8 10/08/2024     (H) 10/08/2024       Hemoglobin A1C (%)   Date Value   10/08/2024 7.5 (H)   2024 7.0 (H)   2024 7.1 (H)   2023 7.6 (H)   2023 7.4 (H)   2023 7.2 (H)   2023 7.8 (H)   2022 7.1 (H)   2022 7.6 (H)   2022 6.9 (H)   2021 7.7 (H)       Vital signs reviewed  PE:   APPEARANCE: Well nourished, well developed, in no acute distress.    HEAD: Normocephalic, atraumatic.  NECK: Supple with no cervical lymphadenopathy.    CHEST: Good inspiratory effort.  Lungs clear to auscultation with no wheezes or crackles.  CARDIOVASCULAR: Normal S1, S2. No rubs, murmurs, or gallops.  ABDOMEN: Bowel sounds normal. Not distended. Soft. No tenderness or masses. No organomegaly.  EXTREMITIES: No edema   DIABETIC FOOT EXAM: 4/2024                IMPRESSION  Encounter Diagnoses   Name Primary?    Coronary artery disease involving native coronary artery of native heart with angina pectoris Yes    Essential hypertension     Mixed hyperlipidemia     Type 2 diabetes mellitus with other specified complication, without long-term current use of insulin     Anemia, unspecified type     Other long term (current) drug therapy                          PLAN  Diabetes:  Continue metformin 2 g daily, glipizide 10 b.i.d., sitagliptin 100 mg daily .  Had stopped his Jardiance.  A1c initially improving, has gone back up to 7.5.  He has had some issues with hypoglycemia especially when starting on Jardiance possibly in combination with his sulfonylurea.  At this point he is interested in continuing his current regimen and pain close attention to his blood sugars in his diet.  Will check back in 3 months.  1 option if we need to modify his therapy would be to add back Jardiance 10 mg daily and perhaps reduce dose of glipizide slightly just to prevent any hypoglycemic concerns.    HLD: controlled.     CAD stable, no issues    HTN controlled       UC: no symptoms, no meds, make sure to follow up with GI for repeat scope as below    Mild anemia, make sure to check in with his GI regarding updating his colonoscopy.  Will check labs below in 3 months.  He denies any concerns about blood loss, stool problems.      Occasional neck pain described especially at nighttime when he is sleeping.  No neuropathic symptoms.  Feels like it is not that bad but just sometimes gets stiff.  Advise on heat, topical analgesics, neck exercises printed      F/u 3 mo with labs prior   Orders Placed This Encounter   Procedures     CBC Auto Differential    Iron and TIBC    Ferritin    Folate    Vitamin B12    Reticulocytes    Comprehensive Metabolic Panel    Lipid Panel    Hemoglobin A1C       HEALTH SCREENINGS   Immunizations:  Pneumovax 2015  zoster:  Up-to-date  Tetanus  22  COVID vaccine (J/J) 2021.  Pfizer booster on 2021, encourage booster vaccine., declines today but can get at pharmacy  PCV20 utd  Flu today     Age/Gender Appropriate screenings:  Colonoscopy in  diffuse mildly altered vascular mucosa distal descending colon and rectum and sigmoid colon, 5 mm sigmoid polyp, repeat colonoscopy 2 years.  He will make a follow-up appointment with his GI physician.  Prostate screenin/24  AAA screen negative 2023

## 2024-10-15 NOTE — PATIENT INSTRUCTIONS
Shoulder Clock Exercise    To start, stand tall with your ears, shoulders, and hips in line. Your feet should be slightly apart, positioned just under your hips. Focus your eyes directly in front of you.  this position for a few seconds before starting your exercise. This helps increase your awareness of proper posture.  Imagine that your right shoulder is the center of a clock. With the outer point of your shoulder, roll it around to slowly trace the outer edge of the clock.  Move clockwise first, then counterclockwise.  Repeat 3 to 5 times. Switch shoulders.   Date Last Reviewed: 10/2/2015  © 3505-1509 [employer]. [address] [place] [address]. All rights reserved. This information is not intended as a substitute for professional medical care. Always follow your healthcare professional's instructions.        Shoulder Shrug Exercise    To start, sit in a chair with your feet flat on the floor. Shift your weight slightly forward to avoid rounding your back. Relax. Keep your ears, shoulders, and hips aligned:  Raise both of your shoulders as high as you can, as if you were trying to touch them to your ears. Keep your head and neck still and relaxed.  Hold for a count of 10. Release.  Repeat 5 times.  For your safety, check with your healthcare provider before starting an exercise program.   Date Last Reviewed: 8/16/2015  © 9082-4406 [employer]. [address] [place] [address]. All rights reserved. This information is not intended as a substitute for professional medical care. Always follow your healthcare professional's instructions.        Neck Exercises: Neck Flex  To start, sit in a chair with your feet flat on the floor. Your weight should be slightly forward so that youre balanced evenly on your buttocks. Relax your shoulders and keep your head level. Avoid arching your back or rounding your shoulders. Using a chair with arms may help you keep your  balance:  Rest the back of your left hand against your lower back. Place your right palm on the top of your head.  Gently pull your head forward and down until you feel a stretch in the muscles in the back of your neck. Dont force the motion.  Hold for 20 seconds, then return to starting position. Switch arms.    Date Last Reviewed: 10/2/2015  © 6212-4456 [employer]. [address] [place] [address]. All rights reserved. This information is not intended as a substitute for professional medical care. Always follow your healthcare professional's instructions.        Neck Clock (Flexibility)    Lie on your back on the floor, with your knees bent and your feet flat on the floor. Place a rolled-up towel or neck roll under your neck.  Close your eyes and imagine a clock face. With your nose, slowly trace the outer edge of the clock in a clockwise direction. Move your neck smoothly. Dont force your head or neck.  Repeat 5 times, or as instructed.  Then switch to a counterclockwise direction, and repeat the exercise 5 times, or as instructed.     Challenge yourself  You can also do this exercise while sitting at your work desk. Sit up straight with your back supported firmly against your chair. You can do the exercise several times throughout your day.   Date Last Reviewed: 3/10/2016  © 6343-2824 [employer]. [address] [place] [address]. All rights reserved. This information is not intended as a substitute for professional medical care. Always follow your healthcare professional's instructions.        Head Tilt / Upper Trapezius Stretch (Flexibility)    Sit up straight in a chair with your head and neck in a neutral position, ears in line with shoulders. Hold the edge of your chair seat with your right hand. Tuck your chin in slightly.  Tilt your head to the left, while looking straight ahead.  Put your left hand on the right side of your head. Gently pull your head to  the left. Hold for 30 to 60 seconds. Use gentle pressure to increase the stretch. Dont force your head into position.  Return your head and neck to the neutral position.  Repeat this exercise 2 times, or as instructed.  Switch sides and repeat 2 times, or as instructed.  Challenge yourself  Tuck one end of a towel under your left arm. Then bring the other end over your right shoulder. Pull the towel down on your right shoulder with both hands as you side-bend your head to the left. Repeat with the other side.   Date Last Reviewed: 3/10/2016  © 1737-2102 [employer]. [address] [place] [address], [place] [address]. All rights reserved. This information is not intended as a substitute for professional medical care. Always follow your healthcare professional's instructions.

## 2024-12-04 RX ORDER — GLIPIZIDE 10 MG/1
TABLET ORAL
Qty: 180 TABLET | Refills: 1 | Status: SHIPPED | OUTPATIENT
Start: 2024-12-04

## 2024-12-04 NOTE — TELEPHONE ENCOUNTER
No care due was identified.  Health [facility] Embedded Care Due Messages. Reference number: [id number].   12/04/2024 12:42:43 PM CST

## 2024-12-05 NOTE — TELEPHONE ENCOUNTER
Refill Decision Note   [facility]  is requesting a refill authorization.  Brief Assessment and Rationale for Refill:  Approve     Medication Therapy Plan:         Comments:     Note composed:11:28 PM 12/04/2024

## 2024-12-12 DIAGNOSIS — I25.119 CORONARY ARTERY DISEASE INVOLVING NATIVE CORONARY ARTERY OF NATIVE HEART WITH ANGINA PECTORIS: ICD-10-CM

## 2024-12-12 NOTE — TELEPHONE ENCOUNTER
No care due was identified.  Health [facility] Embedded Care Due Messages. Reference number: [id number].   12/12/2024 11:14:23 AM CST

## 2024-12-12 NOTE — TELEPHONE ENCOUNTER
Refill Routing Note   Medication(s) are not appropriate for processing by [facility] for the following reason(s):        Responsible provider unclear    [facility] action(s):  Defer      Medication Therapy Plan: Last ordered: 8/15/24 by Miguel Maria MD. Recent OV but no current order by PCP      Appointments  past 12m or future 3m with PCP    Date Provider   Last Visit   10/15/2024 Facundo Alexis MD   Next Visit   1/23/2025 Facundo Alexis MD   ED visits in past 90 days: 0        Note composed:3:43 PM 12/12/2024

## 2024-12-13 RX ORDER — CLOPIDOGREL BISULFATE 75 MG/1
75 TABLET ORAL DAILY
Qty: 90 TABLET | Refills: 3 | Status: SHIPPED | OUTPATIENT
Start: 2024-12-13

## 2025-01-17 ENCOUNTER — LAB VISIT (OUTPATIENT)
Dept: LAB | Facility: HOSPITAL | Age: 65
End: 2025-01-17
Attending: FAMILY MEDICINE
Payer: MEDICARE

## 2025-01-17 DIAGNOSIS — Z79.899 OTHER LONG TERM (CURRENT) DRUG THERAPY: ICD-10-CM

## 2025-01-17 DIAGNOSIS — I25.119 CORONARY ARTERY DISEASE INVOLVING NATIVE CORONARY ARTERY OF NATIVE HEART WITH ANGINA PECTORIS: ICD-10-CM

## 2025-01-17 DIAGNOSIS — E11.69 TYPE 2 DIABETES MELLITUS WITH OTHER SPECIFIED COMPLICATION, WITHOUT LONG-TERM CURRENT USE OF INSULIN: ICD-10-CM

## 2025-01-17 DIAGNOSIS — D64.9 ANEMIA, UNSPECIFIED TYPE: ICD-10-CM

## 2025-01-17 DIAGNOSIS — I10 ESSENTIAL HYPERTENSION: ICD-10-CM

## 2025-01-17 DIAGNOSIS — E78.2 MIXED HYPERLIPIDEMIA: ICD-10-CM

## 2025-01-17 LAB
ALBUMIN SERPL BCP-MCNC: 4 G/DL (ref 3.5–5.2)
ALP SERPL-CCNC: 61 U/L (ref 40–150)
ALT SERPL W/O P-5'-P-CCNC: 15 U/L (ref 10–44)
ANION GAP SERPL CALC-SCNC: 9 MMOL/L (ref 8–16)
AST SERPL-CCNC: 14 U/L (ref 10–40)
BASOPHILS # BLD AUTO: 0.07 K/UL (ref 0–0.2)
BASOPHILS NFR BLD: 0.8 % (ref 0–1.9)
BILIRUB SERPL-MCNC: 0.6 MG/DL (ref 0.1–1)
BUN SERPL-MCNC: 11 MG/DL (ref 8–23)
CALCIUM SERPL-MCNC: 9.2 MG/DL (ref 8.7–10.5)
CHLORIDE SERPL-SCNC: 104 MMOL/L (ref 95–110)
CHOLEST SERPL-MCNC: 157 MG/DL (ref 120–199)
CHOLEST/HDLC SERPL: 3.9 {RATIO} (ref 2–5)
CO2 SERPL-SCNC: 24 MMOL/L (ref 23–29)
CREAT SERPL-MCNC: 0.9 MG/DL (ref 0.5–1.4)
DIFFERENTIAL METHOD BLD: ABNORMAL
EOSINOPHIL # BLD AUTO: 0.4 K/UL (ref 0–0.5)
EOSINOPHIL NFR BLD: 3.9 % (ref 0–8)
ERYTHROCYTE [DISTWIDTH] IN BLOOD BY AUTOMATED COUNT: 12.4 % (ref 11.5–14.5)
EST. GFR  (NO RACE VARIABLE): >60 ML/MIN/1.73 M^2
ESTIMATED AVG GLUCOSE: 160 MG/DL (ref 68–131)
FERRITIN SERPL-MCNC: 100 NG/ML (ref 20–300)
FOLATE SERPL-MCNC: 11.9 NG/ML (ref 4–24)
GLUCOSE SERPL-MCNC: 170 MG/DL (ref 70–110)
HBA1C MFR BLD: 7.2 % (ref 4–5.6)
HCT VFR BLD AUTO: 36.3 % (ref 40–54)
HDLC SERPL-MCNC: 40 MG/DL (ref 40–75)
HDLC SERPL: 25.5 % (ref 20–50)
HGB BLD-MCNC: 12 G/DL (ref 14–18)
IMM GRANULOCYTES # BLD AUTO: 0.04 K/UL (ref 0–0.04)
IMM GRANULOCYTES NFR BLD AUTO: 0.4 % (ref 0–0.5)
IRON SERPL-MCNC: 86 UG/DL (ref 45–160)
LDLC SERPL CALC-MCNC: 81.6 MG/DL (ref 63–159)
LYMPHOCYTES # BLD AUTO: 2.3 K/UL (ref 1–4.8)
LYMPHOCYTES NFR BLD: 25.2 % (ref 18–48)
MCH RBC QN AUTO: 28.8 PG (ref 27–31)
MCHC RBC AUTO-ENTMCNC: 33.1 G/DL (ref 32–36)
MCV RBC AUTO: 87 FL (ref 82–98)
MONOCYTES # BLD AUTO: 0.7 K/UL (ref 0.3–1)
MONOCYTES NFR BLD: 7.4 % (ref 4–15)
NEUTROPHILS # BLD AUTO: 5.7 K/UL (ref 1.8–7.7)
NEUTROPHILS NFR BLD: 62.3 % (ref 38–73)
NONHDLC SERPL-MCNC: 117 MG/DL
NRBC BLD-RTO: 0 /100 WBC
PLATELET # BLD AUTO: 257 K/UL (ref 150–450)
PMV BLD AUTO: 11.2 FL (ref 9.2–12.9)
POTASSIUM SERPL-SCNC: 4.4 MMOL/L (ref 3.5–5.1)
PROT SERPL-MCNC: 7.7 G/DL (ref 6–8.4)
RBC # BLD AUTO: 4.16 M/UL (ref 4.6–6.2)
RETICS/RBC NFR AUTO: 1.1 % (ref 0.4–2)
SATURATED IRON: 21 % (ref 20–50)
SODIUM SERPL-SCNC: 137 MMOL/L (ref 136–145)
TOTAL IRON BINDING CAPACITY: 413 UG/DL (ref 250–450)
TRANSFERRIN SERPL-MCNC: 279 MG/DL (ref 200–375)
TRIGL SERPL-MCNC: 177 MG/DL (ref 30–150)
VIT B12 SERPL-MCNC: 627 PG/ML (ref 210–950)
WBC # BLD AUTO: 9.13 K/UL (ref 3.9–12.7)

## 2025-01-17 PROCEDURE — 82728 ASSAY OF FERRITIN: CPT | Performed by: FAMILY MEDICINE

## 2025-01-17 PROCEDURE — 80061 LIPID PANEL: CPT | Performed by: FAMILY MEDICINE

## 2025-01-17 PROCEDURE — 82746 ASSAY OF FOLIC ACID SERUM: CPT | Performed by: FAMILY MEDICINE

## 2025-01-17 PROCEDURE — 83036 HEMOGLOBIN GLYCOSYLATED A1C: CPT | Performed by: FAMILY MEDICINE

## 2025-01-17 PROCEDURE — 85025 COMPLETE CBC W/AUTO DIFF WBC: CPT | Performed by: FAMILY MEDICINE

## 2025-01-17 PROCEDURE — 82607 VITAMIN B-12: CPT | Performed by: FAMILY MEDICINE

## 2025-01-17 PROCEDURE — 80053 COMPREHEN METABOLIC PANEL: CPT | Performed by: FAMILY MEDICINE

## 2025-01-17 PROCEDURE — 84466 ASSAY OF TRANSFERRIN: CPT | Performed by: FAMILY MEDICINE

## 2025-01-17 PROCEDURE — 85045 AUTOMATED RETICULOCYTE COUNT: CPT | Performed by: FAMILY MEDICINE

## 2025-01-17 NOTE — TELEPHONE ENCOUNTER
Care Due:                  Date            Visit Type   Department     Provider  --------------------------------------------------------------------------------                                EP -                              [facility]  Last Visit: 10-      CARE ([facility])   GABRIEL Alexis                              [facility]  Next Visit: 01-      CARE ([facility])   [facility]                                                            Last  Test          Frequency    Reason                     Performed    Due Date  --------------------------------------------------------------------------------    HBA1C.......  6 months...  SITagliptin, glipiZIDE,    10-   04-                             metFORMIN................    Health [facility] Embedded Care Due Messages. Reference number: [id number].   1/17/2025 2:44:30 PM CST

## 2025-01-18 RX ORDER — SITAGLIPTIN 100 MG/1
100 TABLET, FILM COATED ORAL
Qty: 90 TABLET | Refills: 1 | Status: SHIPPED | OUTPATIENT
Start: 2025-01-18 | End: 2025-01-23 | Stop reason: SDUPTHER

## 2025-01-18 NOTE — TELEPHONE ENCOUNTER
Refill Routing Note   Medication(s) are not appropriate for processing by [facility] for the following reason(s):        Drug-disease interaction: JANUVIA and Cardiomyopathy, ischemic     [facility] action(s):  Defer        Medication Therapy Plan: A1C done 1/17/25      Appointments  past 12m or future 3m with PCP    Date Provider   Last Visit   10/15/2024 Facundo Alexis MD   Next Visit   1/23/2025 Facundo Alexis MD   ED visits in past 90 days: 0        Note composed:9:13 PM 01/17/2025

## 2025-01-23 ENCOUNTER — OFFICE VISIT (OUTPATIENT)
Dept: FAMILY MEDICINE | Facility: CLINIC | Age: 65
End: 2025-01-23
Payer: MEDICARE

## 2025-01-23 DIAGNOSIS — I10 ESSENTIAL HYPERTENSION: ICD-10-CM

## 2025-01-23 DIAGNOSIS — E11.69 TYPE 2 DIABETES MELLITUS WITH OTHER SPECIFIED COMPLICATION, WITHOUT LONG-TERM CURRENT USE OF INSULIN: ICD-10-CM

## 2025-01-23 DIAGNOSIS — E78.2 MIXED HYPERLIPIDEMIA: ICD-10-CM

## 2025-01-23 DIAGNOSIS — I25.119 CORONARY ARTERY DISEASE INVOLVING NATIVE CORONARY ARTERY OF NATIVE HEART WITH ANGINA PECTORIS: ICD-10-CM

## 2025-01-23 DIAGNOSIS — Z12.11 COLON CANCER SCREENING: ICD-10-CM

## 2025-01-23 DIAGNOSIS — K51.919 ULCERATIVE COLITIS WITH COMPLICATION, UNSPECIFIED LOCATION: ICD-10-CM

## 2025-01-23 DIAGNOSIS — D64.9 ANEMIA, UNSPECIFIED TYPE: Primary | ICD-10-CM

## 2025-01-23 DIAGNOSIS — Z12.5 SCREENING FOR MALIGNANT NEOPLASM OF PROSTATE: ICD-10-CM

## 2025-01-23 PROCEDURE — G2211 COMPLEX E/M VISIT ADD ON: HCPCS | Mod: 95,,, | Performed by: FAMILY MEDICINE

## 2025-01-23 PROCEDURE — 98006 SYNCH AUDIO-VIDEO EST MOD 30: CPT | Mod: 95,,, | Performed by: FAMILY MEDICINE

## 2025-01-23 PROCEDURE — 1160F RVW MEDS BY RX/DR IN RCRD: CPT | Mod: CPTII,95,, | Performed by: FAMILY MEDICINE

## 2025-01-23 PROCEDURE — 3051F HG A1C>EQUAL 7.0%<8.0%: CPT | Mod: CPTII,95,, | Performed by: FAMILY MEDICINE

## 2025-01-23 PROCEDURE — 1159F MED LIST DOCD IN RCRD: CPT | Mod: CPTII,95,, | Performed by: FAMILY MEDICINE

## 2025-01-23 RX ORDER — METHOCARBAMOL 500 MG/1
1000 TABLET, FILM COATED ORAL 3 TIMES DAILY PRN
Qty: 60 TABLET | Refills: 0 | Status: SHIPPED | OUTPATIENT
Start: 2025-01-23

## 2025-01-23 NOTE — PROGRESS NOTES
The patient location is: home  The chief complaint leading to consultation is: f/u    Visit type: Virtual visit with synchronous audio and video  Total time spent with patient:  15 min  Each patient to whom he or she provides medical services by telemedicine is:  (1) informed of the relationship between the physician and patient and the respective role of any other health care provider with respect to management of the patient; and (2) notified that he or she may decline to receive medical services by telemedicine and may withdraw from such care at any time.      (Portions of this note were dictated using voice recognition software and may contain dictation related errors in spelling/grammar/syntax not found on text review)         No chief complaint on file.      HPI: 64 y.o. male Last visit July of 2024      Diabetes, on metformin  2 pills am/2 pill pm.  glipizide 10 mg twice a day, Januvia 100 mg daily.  Was on Jardiance 25 mg daily but states that when taking Januvia and Jardiance together his sugars were dropping too much.  Tried to switch him to weekly G LP 1 agonist with Trulicity but apparently this was not covered and recommendation was to try bydureon, Victoza, or Byetta 1st.  Tried later   Bydureon weekly but apparently had some local allergic reaction with injections.  Had advised on potential of Victoza although he did not seem interested in doing a daily injection.  Ended up going to Mounjaro but even at 2.5 mg had significant appetite loss and did not feel well so he got off of it after 1 injection.      A1c 7.6 - 7.1 - 7.0-7.5--> 7.2    Sugars running high sometimes up to 200. No hypoglycemia reported    Does not take aspirin because of ALLERGY.    eye exam :   (Dr. Rapp)  Sees a dentist regularly.    No neuropathy symptoms     CAD:  Presentation march 2017 with angina. CAD on angio, no revasculariability. Medically managed with statin (was on Lipitor 80 but given suboptimal LDL switched  over to Crestor 40), plavix, metoprolol suc 25 daily, losartan 100 mg daily, imdur 30 mg daily.  Had a nuclear stress test done last in December 2, 2020. Normal perfusion scan.  EF normal at rest and with stress.  Last cardiology visit was on 11/05/2020     Hypertension on losartan 100 mg daily,metoprolol 50 mg daily,Amlodipine 5 mg daily .        Ulcerative colitis, prior followed by GI. Currently not taking mesalamine (asymptomatic at this time)    Omeprazole PRN for GERD sx    Chronic left knee pain, no recent injury.  No swelling of the knee.  Does get stiff sometimes.  X-ray in June showed calcification along MCL suggesting ligamentous injury.  Medial compartment narrowing  left greater than right, moderate left-sided suprapatellar joint effusion and superior and inferior patellar articular surface spurring.  Had given topical diclofenac gel, had discussed potential physical therapy.   Went to Orthopedics, was given steroid shot which helped.  However has told that he has severe osteoarthritis and will ultimately need a knee replacement.  However doing okay so far.      Mild stable anemia, has had normal B12 testing in the past, had low iron at 1 time but not currently.  Is vegetarian but not vegan    Does get some neck pain off and on. Recently had a long drive and felt some stiffness after. No neuropathic sx. Requesting mm relaxant. Has taken tylenol here and there, no nsaids otherwise.     Past Medical History:   Diagnosis Date    Coronary artery disease involving native coronary artery of native heart without angina pectoris 7/29/2017    Diabetes mellitus, type II     HTN (hypertension)     Mixed hyperlipidemia 10/24/2019    Ulcerative colitis        Past Surgical History:   Procedure Laterality Date    CATARACT EXTRACTION Bilateral 2013    COLONOSCOPY N/A 1/22/2016    Procedure: COLONOSCOPY;  Surgeon: Aristeo Lynn Jr., MD;  Location: [facility];  Service: Endoscopy;  Laterality: N/A;    COLONOSCOPY  N/A 10/13/2020    Procedure: COLONOSCOPY;  Surgeon: Charly Travis MD;  Location: [facility] ENDO;  Service: Endoscopy;  Laterality: N/A;    COLONOSCOPY N/A 5/3/2021    Procedure: COLONOSCOPY;  Surgeon: Charly Travis MD;  Location: [facility] ENDO;  Service: Endoscopy;  Laterality: N/A;       Family History   Problem Relation Name Age of Onset    Coronary artery disease Mother  75    Coronary artery disease Father  55    Coronary artery disease Brother  59    Diabetes Brother      Prostate cancer Neg Hx      Colon cancer Neg Hx         Social History     Tobacco Use    Smoking status: Former     Current packs/day: 0.00     Types: Cigarettes     Quit date: 3/16/2008     Years since quittin.8    Smokeless tobacco: Former     Quit date: 2009   Substance Use Topics    Alcohol use: Not Currently     Alcohol/week: 2.0 standard drinks of alcohol     Types: 1 Shots of liquor, 1 Glasses of wine per week     Comment: occasionally    Drug use: No       Lab Results   Component Value Date    WBC 9.13 2025    HGB 12.0 (L) 2025    HCT 36.3 (L) 2025    MCV 87 2025     2025    CHOL 157 2025    TRIG 177 (H) 2025    HDL 40 2025    ALT 15 2025    AST 14 2025    BILITOT 0.6 2025    ALKPHOS 61 2025     2025    K 4.4 2025     2025    CREATININE 0.9 2025    ESTGFRAFRICA >60.0 2022    EGFRNONAA >60.0 2022    CALCIUM 9.2 2025    ALBUMIN 4.0 2025    BUN 11 2025    CO2 24 2025    TSH 2.519 2021    PSA 0.50 2024    INR 1.0 2022    HGBA1C 7.2 (H) 2025    MICALBCREAT Unable to calculate 2024    LDLCALC 81.6 2025     (H) 2025       Hemoglobin A1C (%)   Date Value   2025 7.2 (H)   10/08/2024 7.5 (H)   2024 7.0 (H)   2024 7.1 (H)   2023 7.6 (H)   2023 7.4 (H)   2023 7.2 (H)   2023 7.8 (H)   2022 7.1  (H)   04/06/2022 7.6 (H)   01/07/2022 6.9 (H)       Vital signs reviewed  PE:   DIABETIC FOOT EXAM: 4/2024                IMPRESSION  Encounter Diagnoses   Name Primary?    Type 2 diabetes mellitus with other specified complication, without long-term current use of insulin     Anemia, unspecified type Yes    Coronary artery disease involving native coronary artery of native heart with angina pectoris     Essential hypertension     Mixed hyperlipidemia     Ulcerative colitis with complication, unspecified location     Colon cancer screening     Screening for malignant neoplasm of prostate                          PLAN    DM: see instructions below    HLD: controlled.     CAD stable, no issues    HTN controlled       UC: no symptoms, no meds, make sure to follow up with GI for repeat scope as below    Mild anemia, see instructions. He denies any concerns about blood loss, stool problems.      Occasional neck pain: see instructions below      F/u 3 mo with labs prior   Orders Placed This Encounter   Procedures    CBC Auto Differential    Comprehensive Metabolic Panel    Hemoglobin A1C    PSA, Screening    Lipid Panel    Microalbumin/Creatinine Ratio, Urine    Ambulatory referral/consult to Endo Procedure      Patient Instructions   Start back on Jardiance 10 mg daily    Continue all other meds    If sugars start dropping below 70 or you are feeling weak and shaky or sweaty as a concern for low blood sugar, we may have to decrease your glipizide dose. If you notice low sugars mid morning or afternoon, you should drop your glipizide dosing down to 1/2 pill of the 10 mg in the morning (5 mg in morning and 10 mg in evening with dinner.). If you notice sugar dropping more in the night time, decrease your dinner glipizide to half pill (5 mg in evening, and keep 10 mg in the morning with breakfast).     Goal blood sugars: (no less than 70 but less than 140 in the morning, and less than 180 if taken 2 hours after a  meal)    We'll recheck labs in 3 months and schedule appointment afterward to review    Neck pain: I've sent methocarbamol muscle relaxer--watch out for drowsiness. You can take tylenol or later advil or aleve if needed. You can use heat to the neck. See the neck exercises below    I'll put in an order for a colonoscopy: they should call to schedule this at your convenience.        Shoulder Clock Exercise    To start, stand tall with your ears, shoulders, and hips in line. Your feet should be slightly apart, positioned just under your hips. Focus your eyes directly in front of you.  this position for a few seconds before starting your exercise. This helps increase your awareness of proper posture.  Imagine that your right shoulder is the center of a clock. With the outer point of your shoulder, roll it around to slowly trace the outer edge of the clock.  Move clockwise first, then counterclockwise.  Repeat 3 to 5 times. Switch shoulders.   Date Last Reviewed: 10/2/2015  © 0294-7646 [employer]. [address] [place] [address]. All rights reserved. This information is not intended as a substitute for professional medical care. Always follow your healthcare professional's instructions.        Shoulder Shrug Exercise    To start, sit in a chair with your feet flat on the floor. Shift your weight slightly forward to avoid rounding your back. Relax. Keep your ears, shoulders, and hips aligned:  Raise both of your shoulders as high as you can, as if you were trying to touch them to your ears. Keep your head and neck still and relaxed.  Hold for a count of 10. Release.  Repeat 5 times.  For your safety, check with your healthcare provider before starting an exercise program.   Date Last Reviewed: 8/16/2015  © 5881-6239 [employer]. [address] [place] [address]. All rights reserved. This information is not intended as a substitute for professional medical care.  Always follow your healthcare professional's instructions.        Neck Exercises: Neck Flex  To start, sit in a chair with your feet flat on the floor. Your weight should be slightly forward so that youre balanced evenly on your buttocks. Relax your shoulders and keep your head level. Avoid arching your back or rounding your shoulders. Using a chair with arms may help you keep your balance:  Rest the back of your left hand against your lower back. Place your right palm on the top of your head.  Gently pull your head forward and down until you feel a stretch in the muscles in the back of your neck. Dont force the motion.  Hold for 20 seconds, then return to starting position. Switch arms.    Date Last Reviewed: 10/2/2015  © 2257-6525 [employer]. [address] [place] [address]. All rights reserved. This information is not intended as a substitute for professional medical care. Always follow your healthcare professional's instructions.        Neck Clock (Flexibility)    Lie on your back on the floor, with your knees bent and your feet flat on the floor. Place a rolled-up towel or neck roll under your neck.  Close your eyes and imagine a clock face. With your nose, slowly trace the outer edge of the clock in a clockwise direction. Move your neck smoothly. Dont force your head or neck.  Repeat 5 times, or as instructed.  Then switch to a counterclockwise direction, and repeat the exercise 5 times, or as instructed.     Challenge yourself  You can also do this exercise while sitting at your work desk. Sit up straight with your back supported firmly against your chair. You can do the exercise several times throughout your day.   Date Last Reviewed: 3/10/2016  © 0444-0434 [employer]. [address] [place] [address]. All rights reserved. This information is not intended as a substitute for professional medical care. Always follow your healthcare professional's  "instructions.        Head Tilt / Upper Trapezius Stretch (Flexibility)    Sit up straight in a chair with your head and neck in a neutral position, ears in line with shoulders. Hold the edge of your chair seat with your right hand. Tuck your chin in slightly.  Tilt your head to the left, while looking straight ahead.  Put your left hand on the right side of your head. Gently pull your head to the left. Hold for 30 to 60 seconds. Use gentle pressure to increase the stretch. Dont force your head into position.  Return your head and neck to the neutral position.  Repeat this exercise 2 times, or as instructed.  Switch sides and repeat 2 times, or as instructed.  Challenge yourself  Tuck one end of a towel under your left arm. Then bring the other end over your right shoulder. Pull the towel down on your right shoulder with both hands as you side-bend your head to the left. Repeat with the other side.   Date Last Reviewed: 3/10/2016  © 1643-4861 [employer]. [address] [place] [address]. All rights reserved. This information is not intended as a substitute for professional medical care. Always follow your healthcare professional's instructions.        Over the counter pain therapies (creams/patch):    1. Lidocaine patch    2. aspercreme    3. "2 old goats"    4. "blue emu"    5. salonpas     7. biofreeze          HEALTH SCREENINGS   Immunizations:  Pneumovax   zoster:  Up-to-date  Tetanus  22  COVID vaccine (J/J) 2021.  Pfizer booster on 2021, encourage booster vaccine., declines today but can get at pharmacy  PCV20 utd  Flu utd     Age/Gender Appropriate screenings:  Colonoscopy in  diffuse mildly altered vascular mucosa distal descending colon and rectum and sigmoid colon, 5 mm sigmoid polyp, repeat colonoscopy 2 years.  Requesting order through [facility], placed.   Prostate screenin/24  AAA screen negative 2023              Answers submitted by the patient for " this visit:  Review of Systems Questionnaire (Submitted on 1/22/2025)  activity change: No  unexpected weight change: No  neck pain: Yes  hearing loss: No  rhinorrhea: No  trouble swallowing: No  eye discharge: No  visual disturbance: No  chest tightness: No  wheezing: No  chest pain: No  palpitations: No  blood in stool: No  constipation: No  vomiting: No  diarrhea: No  polydipsia: No  polyuria: No  difficulty urinating: No  urgency: No  hematuria: No  joint swelling: No  arthralgias: No  headaches: No  weakness: No  confusion: No  dysphoric mood: No

## 2025-01-23 NOTE — PATIENT INSTRUCTIONS
Start back on Jardiance 10 mg daily    Continue all other meds    If sugars start dropping below 70 or you are feeling weak and shaky or sweaty as a concern for low blood sugar, we may have to decrease your glipizide dose. If you notice low sugars mid morning or afternoon, you should drop your glipizide dosing down to 1/2 pill of the 10 mg in the morning (5 mg in morning and 10 mg in evening with dinner.). If you notice sugar dropping more in the night time, decrease your dinner glipizide to half pill (5 mg in evening, and keep 10 mg in the morning with breakfast).     Goal blood sugars: (no less than 70 but less than 140 in the morning, and less than 180 if taken 2 hours after a meal)    We'll recheck labs in 3 months and schedule appointment afterward to review    Neck pain: I've sent methocarbamol muscle relaxer--watch out for drowsiness. You can take tylenol or later advil or aleve if needed. You can use heat to the neck. See the neck exercises below    I'll put in an order for a colonoscopy: they should call to schedule this at your convenience.        Shoulder Clock Exercise    To start, stand tall with your ears, shoulders, and hips in line. Your feet should be slightly apart, positioned just under your hips. Focus your eyes directly in front of you.  this position for a few seconds before starting your exercise. This helps increase your awareness of proper posture.  Imagine that your right shoulder is the center of a clock. With the outer point of your shoulder, roll it around to slowly trace the outer edge of the clock.  Move clockwise first, then counterclockwise.  Repeat 3 to 5 times. Switch shoulders.   Date Last Reviewed: 10/2/2015  © 9256-9865 [employer]. [address] [place] [address], [place] [address]. All rights reserved. This information is not intended as a substitute for professional medical care. Always follow your healthcare professional's instructions.        Shoulder Shrug  Exercise    To start, sit in a chair with your feet flat on the floor. Shift your weight slightly forward to avoid rounding your back. Relax. Keep your ears, shoulders, and hips aligned:  Raise both of your shoulders as high as you can, as if you were trying to touch them to your ears. Keep your head and neck still and relaxed.  Hold for a count of 10. Release.  Repeat 5 times.  For your safety, check with your healthcare provider before starting an exercise program.   Date Last Reviewed: 8/16/2015  © 8565-8677 [employer] [address] [place] [address]. All rights reserved. This information is not intended as a substitute for professional medical care. Always follow your healthcare professional's instructions.        Neck Exercises: Neck Flex  To start, sit in a chair with your feet flat on the floor. Your weight should be slightly forward so that youre balanced evenly on your buttocks. Relax your shoulders and keep your head level. Avoid arching your back or rounding your shoulders. Using a chair with arms may help you keep your balance:  Rest the back of your left hand against your lower back. Place your right palm on the top of your head.  Gently pull your head forward and down until you feel a stretch in the muscles in the back of your neck. Dont force the motion.  Hold for 20 seconds, then return to starting position. Switch arms.    Date Last Reviewed: 10/2/2015  © 8670-2707 [employer] [address] [place] [address]. All rights reserved. This information is not intended as a substitute for professional medical care. Always follow your healthcare professional's instructions.        Neck Clock (Flexibility)    Lie on your back on the floor, with your knees bent and your feet flat on the floor. Place a rolled-up towel or neck roll under your neck.  Close your eyes and imagine a clock face. With your nose, slowly trace the outer edge of the clock in a  clockwise direction. Move your neck smoothly. Dont force your head or neck.  Repeat 5 times, or as instructed.  Then switch to a counterclockwise direction, and repeat the exercise 5 times, or as instructed.     Challenge yourself  You can also do this exercise while sitting at your work desk. Sit up straight with your back supported firmly against your chair. You can do the exercise several times throughout your day.   Date Last Reviewed: 3/10/2016  © 9639-7101 [employer]. [address] [place] [address]. All rights reserved. This information is not intended as a substitute for professional medical care. Always follow your healthcare professional's instructions.        Head Tilt / Upper Trapezius Stretch (Flexibility)    Sit up straight in a chair with your head and neck in a neutral position, ears in line with shoulders. Hold the edge of your chair seat with your right hand. Tuck your chin in slightly.  Tilt your head to the left, while looking straight ahead.  Put your left hand on the right side of your head. Gently pull your head to the left. Hold for 30 to 60 seconds. Use gentle pressure to increase the stretch. Dont force your head into position.  Return your head and neck to the neutral position.  Repeat this exercise 2 times, or as instructed.  Switch sides and repeat 2 times, or as instructed.  Challenge yourself  Tuck one end of a towel under your left arm. Then bring the other end over your right shoulder. Pull the towel down on your right shoulder with both hands as you side-bend your head to the left. Repeat with the other side.   Date Last Reviewed: 3/10/2016  © 3441-4779 [employer]. [address] [place] [address]. All rights reserved. This information is not intended as a substitute for professional medical care. Always follow your healthcare professional's instructions.        Over the counter pain therapies (creams/patch):    1. Lidocaine  "patch    2. aspercreme    3. "2 old goats"    4. "blue emu"    5. salonpas     7. biofreeze    "

## 2025-01-27 ENCOUNTER — PATIENT MESSAGE (OUTPATIENT)
Dept: FAMILY MEDICINE | Facility: CLINIC | Age: 65
End: 2025-01-27
Payer: MEDICARE

## 2025-01-28 ENCOUNTER — TELEPHONE (OUTPATIENT)
Dept: GASTROENTEROLOGY | Facility: CLINIC | Age: 65
End: 2025-01-28
Payer: MEDICARE

## 2025-01-28 NOTE — TELEPHONE ENCOUNTER
----- Message from Nasim sent at 1/28/2025  8:27 AM CST -----  [facility]  Type:  Sooner Appointment Request    Caller is requesting a sooner appointment.  Caller declined first available appointment listed below.  Caller will not accept being placed on the waitlist and is requesting a message be sent to doctor.    Name of Caller:  [facility]  When is the first available appointment?  NA  Symptoms:  NA  Would the patient rather a call back or a response [facility]? Call Back  Best Call Back Number:  [phone]  Additional Information:  [facility] reached out, patient is asking to have his colonoscopy scheduled in [place].

## 2025-01-28 NOTE — TELEPHONE ENCOUNTER
Spoke with patient he states he has not been on any medication or seen another doctor for his UC and not having any issues or problems since he was last seen scoped by Dr Travis in 2021, patient informed with check with providers if colonoscopy can be done here in [place] or if he will need to be done at [facility].

## 2025-01-30 ENCOUNTER — TELEPHONE (OUTPATIENT)
Dept: FAMILY MEDICINE | Facility: CLINIC | Age: 65
End: 2025-01-30
Payer: MEDICARE

## 2025-01-30 ENCOUNTER — TELEPHONE (OUTPATIENT)
Dept: GASTROENTEROLOGY | Facility: CLINIC | Age: 65
End: 2025-01-30
Payer: MEDICARE

## 2025-01-30 DIAGNOSIS — Z12.11 SCREENING FOR COLORECTAL CANCER: Primary | ICD-10-CM

## 2025-01-30 DIAGNOSIS — Z12.12 SCREENING FOR COLORECTAL CANCER: Primary | ICD-10-CM

## 2025-01-30 DIAGNOSIS — Z87.19 HX OF CHRONIC ULCERATIVE COLITIS: ICD-10-CM

## 2025-01-30 NOTE — TELEPHONE ENCOUNTER
Reginaldo per Dr Bloom-Colonoscopy 3/31 instructions reviewed and patient states understanding. Copy to portal.  Plavix clearance sent to Dr Alexis

## 2025-01-30 NOTE — TELEPHONE ENCOUNTER
----- Message from Nurse Vernon sent at 1/30/2025  8:42 AM CST -----  Regarding: clearance  Mr. Lind has a colonoscopy scheduled with Dr. Bloom on 3/31/25. Please send clearance to hold Plavix for 5 days?     If you have any questions or concerns, please don't hesitate to call.    Sincerely,  AMENA Vernon  [phone]

## 2025-02-11 ENCOUNTER — PATIENT MESSAGE (OUTPATIENT)
Dept: FAMILY MEDICINE | Facility: CLINIC | Age: 65
End: 2025-02-11
Payer: MEDICARE

## 2025-02-11 DIAGNOSIS — M54.2 NECK PAIN: Primary | ICD-10-CM

## 2025-02-12 ENCOUNTER — PATIENT MESSAGE (OUTPATIENT)
Dept: FAMILY MEDICINE | Facility: CLINIC | Age: 65
End: 2025-02-12
Payer: MEDICARE

## 2025-02-12 ENCOUNTER — HOSPITAL ENCOUNTER (OUTPATIENT)
Dept: RADIOLOGY | Facility: HOSPITAL | Age: 65
Discharge: HOME | End: 2025-02-12
Attending: FAMILY MEDICINE
Payer: MEDICARE

## 2025-02-12 DIAGNOSIS — M54.2 NECK PAIN: ICD-10-CM

## 2025-02-12 PROCEDURE — 72040 X-RAY EXAM NECK SPINE 2-3 VW: CPT | Mod: TC,FY,PO

## 2025-02-12 PROCEDURE — 72040 X-RAY EXAM NECK SPINE 2-3 VW: CPT | Mod: 26,,, | Performed by: RADIOLOGY

## 2025-02-17 RX ORDER — METFORMIN HYDROCHLORIDE 500 MG/1
TABLET, EXTENDED RELEASE ORAL
Qty: 360 TABLET | Refills: 1 | Status: SHIPPED | OUTPATIENT
Start: 2025-02-17

## 2025-02-17 NOTE — TELEPHONE ENCOUNTER
No care due was identified.  [facility] Embedded Care Due Messages. Reference number: [id number].   2/17/2025 4:33:05 PM CST

## 2025-02-17 NOTE — TELEPHONE ENCOUNTER
No care due was identified.  [facility] Embedded Care Due Messages. Reference number: [id number].   2/17/2025 4:17:53 PM CST

## 2025-02-18 ENCOUNTER — RESULTS FOLLOW-UP (OUTPATIENT)
Dept: FAMILY MEDICINE | Facility: CLINIC | Age: 65
End: 2025-02-18

## 2025-02-18 RX ORDER — METFORMIN HYDROCHLORIDE 500 MG/1
TABLET, EXTENDED RELEASE ORAL
Qty: 360 TABLET | Refills: 1 | OUTPATIENT
Start: 2025-02-18

## 2025-02-18 NOTE — TELEPHONE ENCOUNTER
Refill Decision Note   Michoacano Lelo  is requesting a refill authorization.  Brief Assessment and Rationale for Refill:  Quick Discontinue     Medication Therapy Plan:         Comments:     Note composed:4:29 AM 02/18/2025

## 2025-03-26 ENCOUNTER — TELEPHONE (OUTPATIENT)
Dept: ENDOSCOPY | Facility: HOSPITAL | Age: 65
End: 2025-03-26

## 2025-03-26 ENCOUNTER — CLINICAL SUPPORT (OUTPATIENT)
Dept: ENDOSCOPY | Facility: HOSPITAL | Age: 65
End: 2025-03-26
Attending: FAMILY MEDICINE
Payer: MEDICARE

## 2025-03-26 VITALS — WEIGHT: 155 LBS | BODY MASS INDEX: 25.02 KG/M2

## 2025-03-26 DIAGNOSIS — Z12.11 COLON CANCER SCREENING: ICD-10-CM

## 2025-03-26 DIAGNOSIS — K51.919 ULCERATIVE COLITIS WITH COMPLICATION, UNSPECIFIED LOCATION: ICD-10-CM

## 2025-03-31 ENCOUNTER — ANESTHESIA EVENT (OUTPATIENT)
Dept: ENDOSCOPY | Facility: HOSPITAL | Age: 65
End: 2025-03-31
Payer: MEDICARE

## 2025-03-31 ENCOUNTER — HOSPITAL ENCOUNTER (OUTPATIENT)
Facility: HOSPITAL | Age: 65
Discharge: HOME | End: 2025-03-31
Attending: INTERNAL MEDICINE | Admitting: INTERNAL MEDICINE
Payer: MEDICARE

## 2025-03-31 ENCOUNTER — ANESTHESIA (OUTPATIENT)
Dept: ENDOSCOPY | Facility: HOSPITAL | Age: 65
End: 2025-03-31
Payer: MEDICARE

## 2025-03-31 DIAGNOSIS — Z87.19 HX OF CHRONIC ULCERATIVE COLITIS: Primary | ICD-10-CM

## 2025-03-31 DIAGNOSIS — Z12.11 SCREEN FOR COLON CANCER: ICD-10-CM

## 2025-03-31 PROCEDURE — 63600175 PHARM REV CODE 636 W HCPCS: Performed by: NURSE ANESTHETIST, CERTIFIED REGISTERED

## 2025-03-31 PROCEDURE — 45380 COLONOSCOPY AND BIOPSY: CPT | Mod: PT | Performed by: INTERNAL MEDICINE

## 2025-03-31 PROCEDURE — 45380 COLONOSCOPY AND BIOPSY: CPT | Mod: PT,,, | Performed by: INTERNAL MEDICINE

## 2025-03-31 PROCEDURE — 27201012 HC FORCEPS, HOT/COLD, DISP: Performed by: INTERNAL MEDICINE

## 2025-03-31 PROCEDURE — 37000008 HC ANESTHESIA 1ST 15 MINUTES: Performed by: INTERNAL MEDICINE

## 2025-03-31 PROCEDURE — 37000009 HC ANESTHESIA EA ADD 15 MINS: Performed by: INTERNAL MEDICINE

## 2025-03-31 PROCEDURE — 88305 TISSUE EXAM BY PATHOLOGIST: CPT | Mod: TC,59 | Performed by: PATHOLOGY

## 2025-03-31 RX ORDER — PROPOFOL 10 MG/ML
VIAL (ML) INTRAVENOUS
Status: DISCONTINUED | OUTPATIENT
Start: 2025-03-31 | End: 2025-03-31

## 2025-03-31 RX ORDER — SODIUM CHLORIDE 9 MG/ML
INJECTION, SOLUTION INTRAVENOUS CONTINUOUS
Status: DISCONTINUED | OUTPATIENT
Start: 2025-03-31 | End: 2025-03-31 | Stop reason: HOSPADM

## 2025-03-31 RX ORDER — LIDOCAINE HYDROCHLORIDE 20 MG/ML
INJECTION INTRAVENOUS
Status: DISCONTINUED | OUTPATIENT
Start: 2025-03-31 | End: 2025-03-31

## 2025-03-31 RX ADMIN — PROPOFOL 30 MG: 10 INJECTION, EMULSION INTRAVENOUS at 12:03

## 2025-03-31 RX ADMIN — PROPOFOL 40 MG: 10 INJECTION, EMULSION INTRAVENOUS at 12:03

## 2025-03-31 RX ADMIN — LIDOCAINE HYDROCHLORIDE 100 MG: 20 INJECTION, SOLUTION INTRAVENOUS at 12:03

## 2025-03-31 RX ADMIN — PROPOFOL 100 MG: 10 INJECTION, EMULSION INTRAVENOUS at 12:03

## 2025-03-31 NOTE — TRANSFER OF CARE
Anesthesia Transfer of Care Note    Patient: Michoacano Lind    Procedure(s) Performed: Procedure(s) (LRB):  COLONOSCOPY, SCREENING, HIGH RISK PATIENT (N/A)    Patient location: PACU    Anesthesia Type: general    Transport from OR: Transported from OR on room air with adequate spontaneous ventilation    Post pain: adequate analgesia    Post assessment: no apparent anesthetic complications    Post vital signs: stable    Level of consciousness: awake    Nausea/Vomiting: no nausea/vomiting    Complications: none    Transfer of care protocol was followed      Last vitals: Visit Vitals  BP (!) 98/54   Pulse 80   Temp 36.6 °C (97.9 °F) (Skin)   Resp 12   SpO2 98%

## 2025-03-31 NOTE — PROVATION PATIENT INSTRUCTIONS
Discharge Summary/Instructions after an Endoscopic Procedure  Patient Name: Michoacano Lind  Patient MRN: [id number]  Patient YOB: 1960 Monday, March 31, 2025  Lucas Mohr MD  Dear patient,  As a result of recent federal legislation (The Federal Cures Act), you may   receive lab or pathology results from your procedure in your [facility]   account before your physician is able to contact you. Your physician or   their representative will relay the results to you with their   recommendations at their soonest availability.  Thank you,  RESTRICTIONS:  During your procedure today, you received medications for sedation.  These   medications may affect your judgment, balance and coordination.  Therefore,   for 24 hours, you have the following restrictions:   - DO NOT drive a car, operate machinery, make legal/financial decisions,   sign important papers or drink alcohol.    ACTIVITY:  Today: no heavy lifting, straining or running due to procedural   sedation/anesthesia.  The following day: return to full activity including work.  DIET:  Eat and drink normally unless instructed otherwise.     TREATMENT FOR COMMON SIDE EFFECTS:  - Mild abdominal pain, nausea, belching, bloating or excessive gas:  rest,   eat lightly and use a heating pad.  - Sore Throat: treat with throat lozenges and/or gargle with warm salt   water.  - Because air was used during the procedure, expelling large amounts of air   from your rectum or belching is normal.  - If a bowel prep was taken, you may not have a bowel movement for 1-3 days.    This is normal.  SYMPTOMS TO WATCH FOR AND REPORT TO YOUR PHYSICIAN:  1. Abdominal pain or bloating, other than gas cramps.  2. Chest pain.  3. Back pain.  4. Signs of infection such as: chills or fever occurring within 24 hours   after the procedure.  5. Rectal bleeding, which would show as bright red, maroon, or black stools.   (A tablespoon of blood from the rectum is not serious, especially if    hemorrhoids are present.)  6. Vomiting.  7. Weakness or dizziness.  GO DIRECTLY TO THE NEAREST EMERGENCY ROOM IF YOU HAVE ANY OF THE FOLLOWING:      Difficulty breathing              Chills and/or fever over 101 F   Persistent vomiting and/or vomiting blood   Severe abdominal pain   Severe chest pain   Black, tarry stools   Bleeding- more than one tablespoon   Any other symptom or condition that you feel may need urgent attention  Your doctor recommends these additional instructions:  If any biopsies were taken, your doctors clinic will contact you in 1 to 2   weeks with any results.  - Patient has a contact number available for emergencies.  The signs and   symptoms of potential delayed complications were discussed with the   patient.  Return to normal activities tomorrow.  Written discharge   instructions were provided to the patient.   - High fiber diet.   - Continue present medications.   - Await pathology results.   - Repeat colonoscopy in 2 years for surveillance.   - Resume Plavix (clopidogrel) at prior dose today.   - Discharge patient to home (ambulatory).   - Return to GI office after studies are complete.  For questions, problems or results please call your physician - Lucas Mohr MD at Work:  [phone].  [facility], EMERGENCY ROOM PHONE NUMBER: [phone]  IF A COMPLICATION OR EMERGENCY SITUATION ARISES AND YOU ARE UNABLE TO REACH   YOUR PHYSICIAN - GO DIRECTLY TO THE EMERGENCY ROOM.  Lucas Mohr MD  3/31/2025 1:05:21 PM  This report has been verified and signed electronically.  Dear patient,  As a result of recent federal legislation (The Federal Cures Act), you may   receive lab or pathology results from your procedure in your [facility]   account before your physician is able to contact you. Your physician or   their representative will relay the results to you with their   recommendations at their soonest availability.  Thank you,  PROVATION

## 2025-03-31 NOTE — ANESTHESIA POSTPROCEDURE EVALUATION
Anesthesia Post Evaluation    Patient: Michoacano Lind    Procedure(s) Performed: Procedure(s) (LRB):  COLONOSCOPY, SCREENING, HIGH RISK PATIENT (N/A)    Final Anesthesia Type: general      Patient location during evaluation: PACU  Patient participation: Yes- Able to Participate  Level of consciousness: awake and alert  Post-procedure vital signs: reviewed and stable  Pain management: adequate  Airway patency: patent    PONV status at discharge: No PONV  Anesthetic complications: no      Cardiovascular status: blood pressure returned to baseline  Respiratory status: unassisted  Hydration status: euvolemic  Follow-up not needed.              Vitals Value Taken Time   /60 03/31/25 13:20   Temp 36.6 °C (97.9 °F) 03/31/25 13:20   Pulse 77 03/31/25 13:20   Resp 12 03/31/25 13:20   SpO2 99 % 03/31/25 13:20         Event Time   Out of Recovery 13:42:21         Pain/Missy Score: Missy Score: 10 (3/31/2025  1:25 PM)

## 2025-03-31 NOTE — H&P
CC: UC    64 year old male with above. States that symptoms are stable, no alleviating/exacerbating factors. No family history of colorectal CA. Positive personal history of polyps. No bleeding or weight loss.     ROS:  No headache, no fever/chills, no chest pain/SOB, no nausea/vomiting/diarrhea/constipation/GI bleeding/abdominal pain, no dysuria/hematuria.    VSSAF   Exam:   Alert and oriented x 3; no apparent distress   PERRLA, sclera anicteric  CV: Regular rate/rhythm, normal PMI   Lungs: Clear bilaterally with no wheeze/rales   Abdomen: Soft, NT/ND, normal bowel sounds   Ext: No cyanosis, clubbing     Impression:   As above    Plan:   Proceed with endoscopy. Further recs to follow.

## 2025-03-31 NOTE — ANESTHESIA PREPROCEDURE EVALUATION
03/31/2025  Michoacano Lind is a 64 y.o., male.      Pre-op Assessment    I have reviewed the Patient Summary Reports.     I have reviewed the Nursing Notes. I have reviewed the NPO Status.   I have reviewed the Medications.     Review of Systems  Anesthesia Hx:             Denies Family Hx of Anesthesia complications.    Denies Personal Hx of Anesthesia complications.                    Hematology/Oncology:       -- Anemia:                                  Cardiovascular:     Hypertension   CAD      Angina     hyperlipidemia   ECG has been reviewed.                            Hepatic/GI:  Bowel Prep.      UC             Endocrine:  Diabetes, type 2               Physical Exam  General: Well nourished, Cooperative, Alert and Oriented    Airway:  Mallampati: II         Anesthesia Plan  Type of Anesthesia, risks & benefits discussed:    Anesthesia Type: Gen Natural Airway  Intra-op Monitoring Plan: Standard ASA Monitors  Induction:  IV  Informed Consent: Informed consent signed with the Patient and all parties understand the risks and agree with anesthesia plan.  All questions answered.   ASA Score: 3    Ready For Surgery From Anesthesia Perspective.     .

## 2025-03-31 NOTE — PLAN OF CARE
Vss, chelle po fluids, denies pain, ambulates easily. IV removed, catheter intact. Discharge instructions provided and states understanding. States ready to go home.  Discharged from facility with family per wheelchair.

## 2025-04-01 ENCOUNTER — RESULTS FOLLOW-UP (OUTPATIENT)
Dept: GASTROENTEROLOGY | Facility: CLINIC | Age: 65
End: 2025-04-01

## 2025-04-01 ENCOUNTER — PATIENT MESSAGE (OUTPATIENT)
Dept: GASTROENTEROLOGY | Facility: CLINIC | Age: 65
End: 2025-04-01
Payer: MEDICARE

## 2025-04-01 VITALS
HEART RATE: 77 BPM | OXYGEN SATURATION: 99 % | TEMPERATURE: 98 F | SYSTOLIC BLOOD PRESSURE: 103 MMHG | RESPIRATION RATE: 12 BRPM | DIASTOLIC BLOOD PRESSURE: 60 MMHG

## 2025-04-22 ENCOUNTER — PATIENT MESSAGE (OUTPATIENT)
Dept: GASTROENTEROLOGY | Facility: CLINIC | Age: 65
End: 2025-04-22
Payer: MEDICARE

## 2025-04-28 ENCOUNTER — PATIENT MESSAGE (OUTPATIENT)
Dept: FAMILY MEDICINE | Facility: CLINIC | Age: 65
End: 2025-04-28
Payer: MEDICARE

## 2025-05-06 ENCOUNTER — OFFICE VISIT (OUTPATIENT)
Dept: GASTROENTEROLOGY | Facility: CLINIC | Age: 65
End: 2025-05-06
Payer: MEDICARE

## 2025-05-06 VITALS
WEIGHT: 154.13 LBS | HEART RATE: 71 BPM | SYSTOLIC BLOOD PRESSURE: 127 MMHG | BODY MASS INDEX: 24.87 KG/M2 | DIASTOLIC BLOOD PRESSURE: 75 MMHG

## 2025-05-06 DIAGNOSIS — K51.919 ULCERATIVE COLITIS WITH COMPLICATION, UNSPECIFIED LOCATION: Primary | ICD-10-CM

## 2025-05-06 DIAGNOSIS — Z87.19 HX OF CHRONIC ULCERATIVE COLITIS: ICD-10-CM

## 2025-05-06 PROCEDURE — 3008F BODY MASS INDEX DOCD: CPT | Mod: CPTII,S$GLB,, | Performed by: INTERNAL MEDICINE

## 2025-05-06 PROCEDURE — 99214 OFFICE O/P EST MOD 30 MIN: CPT | Mod: S$GLB,,, | Performed by: INTERNAL MEDICINE

## 2025-05-06 PROCEDURE — 3051F HG A1C>EQUAL 7.0%<8.0%: CPT | Mod: CPTII,S$GLB,, | Performed by: INTERNAL MEDICINE

## 2025-05-06 PROCEDURE — 1159F MED LIST DOCD IN RCRD: CPT | Mod: CPTII,S$GLB,, | Performed by: INTERNAL MEDICINE

## 2025-05-06 PROCEDURE — 3288F FALL RISK ASSESSMENT DOCD: CPT | Mod: CPTII,S$GLB,, | Performed by: INTERNAL MEDICINE

## 2025-05-06 PROCEDURE — 99999 PR PBB SHADOW E&M-EST. PATIENT-LVL III: CPT | Mod: PBBFAC,,, | Performed by: INTERNAL MEDICINE

## 2025-05-06 PROCEDURE — 3078F DIAST BP <80 MM HG: CPT | Mod: CPTII,S$GLB,, | Performed by: INTERNAL MEDICINE

## 2025-05-06 PROCEDURE — 1126F AMNT PAIN NOTED NONE PRSNT: CPT | Mod: CPTII,S$GLB,, | Performed by: INTERNAL MEDICINE

## 2025-05-06 PROCEDURE — 4010F ACE/ARB THERAPY RXD/TAKEN: CPT | Mod: CPTII,S$GLB,, | Performed by: INTERNAL MEDICINE

## 2025-05-06 PROCEDURE — 3074F SYST BP LT 130 MM HG: CPT | Mod: CPTII,S$GLB,, | Performed by: INTERNAL MEDICINE

## 2025-05-06 PROCEDURE — 1101F PT FALLS ASSESS-DOCD LE1/YR: CPT | Mod: CPTII,S$GLB,, | Performed by: INTERNAL MEDICINE

## 2025-05-06 NOTE — PROGRESS NOTES
Subjective     This is an established patient.      Patient ID: Michoacanojerry Lind is a 65 y.o. male.    Chief Complaint: Ulcerative colitis    Patient seen for follow up of ulcerative colitis, remote onset, treated with mesalamine in the past with good response, but currently no longer on medication.  He has two soft/loose/nonbloody BMs in the morning with minimal discomfort and is fine the rest of the day.  Denies weight loss, abdominal pain, rectal pain, or nocturnal symptoms.  Recent colonoscopy with biopsy reviewed and has mild chronic patchy inflammation consistent with partially treated UC.  Discussed possibility of restarting mesalamine and he is open to this if need be but states that he feels well at present.      Review of Systems   Constitutional:  Negative for chills, fatigue and fever.   HENT:  Negative for trouble swallowing.    Respiratory:  Negative for cough, shortness of breath and wheezing.    Cardiovascular:  Negative for chest pain and palpitations.   Gastrointestinal:  Negative for abdominal pain, constipation, diarrhea, nausea and vomiting.   Musculoskeletal:  Negative for arthralgias and myalgias.   Integumentary:  Negative for color change and rash.   Neurological:  Negative for dizziness, weakness and numbness.   Psychiatric/Behavioral:  Negative for confusion. The patient is not nervous/anxious.    All other systems reviewed and are negative.         Objective     Vitals:    05/06/25 1354   BP: 127/75   BP Location: Right arm   Patient Position: Sitting   Pulse: 71   Weight: 69.9 kg (154 lb 1.6 oz)         Physical Exam  Constitutional:       Appearance: He is well-developed.   HENT:      Head: Normocephalic and atraumatic.   Eyes:      General: No scleral icterus.     Pupils: Pupils are equal, round, and reactive to light.   Neck:      Thyroid: No thyromegaly.   Cardiovascular:      Rate and Rhythm: Normal rate and regular rhythm.      Heart sounds: No murmur heard.  Pulmonary:       Effort: Pulmonary effort is normal.      Breath sounds: Normal breath sounds. No wheezing.   Abdominal:      General: Bowel sounds are normal. There is no distension.      Palpations: Abdomen is soft.      Tenderness: There is no abdominal tenderness.   Musculoskeletal:      Cervical back: Normal range of motion and neck supple.   Lymphadenopathy:      Cervical: No cervical adenopathy.   Skin:     General: Skin is warm and dry.      Findings: No erythema or rash.   Neurological:      Mental Status: He is alert and oriented to person, place, and time.   Psychiatric:         Behavior: Behavior normal.       Lab Results   Component Value Date    WBC 9.13 01/17/2025    HGB 12.0 (L) 01/17/2025    HCT 36.3 (L) 01/17/2025    MCV 87 01/17/2025     01/17/2025         CMP  Sodium   Date Value Ref Range Status   01/17/2025 137 136 - 145 mmol/L Final     Potassium   Date Value Ref Range Status   01/17/2025 4.4 3.5 - 5.1 mmol/L Final     Chloride   Date Value Ref Range Status   01/17/2025 104 95 - 110 mmol/L Final     CO2   Date Value Ref Range Status   01/17/2025 24 23 - 29 mmol/L Final     Glucose   Date Value Ref Range Status   01/17/2025 170 (H) 70 - 110 mg/dL Final     BUN   Date Value Ref Range Status   01/17/2025 11 8 - 23 mg/dL Final     Creatinine   Date Value Ref Range Status   01/17/2025 0.9 0.5 - 1.4 mg/dL Final     Calcium   Date Value Ref Range Status   01/17/2025 9.2 8.7 - 10.5 mg/dL Final     Total Protein   Date Value Ref Range Status   01/17/2025 7.7 6.0 - 8.4 g/dL Final     Albumin   Date Value Ref Range Status   01/17/2025 4.0 3.5 - 5.2 g/dL Final     Total Bilirubin   Date Value Ref Range Status   01/17/2025 0.6 0.1 - 1.0 mg/dL Final     Comment:     For infants and newborns, interpretation of results should be based  on gestational age, weight and in agreement with clinical  observations.    Premature Infant recommended reference ranges:  Up to 24 hours.............<8.0 mg/dL  Up to 48  hours............<12.0 mg/dL  3-5 days..................<15.0 mg/dL  6-29 days.................<15.0 mg/dL       Alkaline Phosphatase   Date Value Ref Range Status   01/17/2025 61 40 - 150 U/L Final     AST   Date Value Ref Range Status   01/17/2025 14 10 - 40 U/L Final     ALT   Date Value Ref Range Status   01/17/2025 15 10 - 44 U/L Final     Anion Gap   Date Value Ref Range Status   01/17/2025 9 8 - 16 mmol/L Final     eGFR   Date Value Ref Range Status   01/17/2025 >60.0 >60 mL/min/1.73 m^2 Final            Assessment and Plan     1. Ulcerative colitis with complication, unspecified location  -     Calprotectin, Stool; Future; Expected date: 05/06/2025    2. Hx of chronic ulcerative colitis        Continue current diet  Check fecal calprotection  Consider restarting mesalamine  Follow up to be determined after above.         No follow-ups on file.

## 2025-05-29 ENCOUNTER — LAB VISIT (OUTPATIENT)
Dept: LAB | Facility: HOSPITAL | Age: 65
End: 2025-05-29
Attending: FAMILY MEDICINE
Payer: MEDICARE

## 2025-05-29 DIAGNOSIS — Z12.5 SCREENING FOR MALIGNANT NEOPLASM OF PROSTATE: ICD-10-CM

## 2025-05-29 DIAGNOSIS — E11.69 TYPE 2 DIABETES MELLITUS WITH OTHER SPECIFIED COMPLICATION, WITHOUT LONG-TERM CURRENT USE OF INSULIN: ICD-10-CM

## 2025-05-29 DIAGNOSIS — D64.9 ANEMIA, UNSPECIFIED TYPE: ICD-10-CM

## 2025-05-29 DIAGNOSIS — E78.2 MIXED HYPERLIPIDEMIA: ICD-10-CM

## 2025-05-29 DIAGNOSIS — Z12.11 COLON CANCER SCREENING: ICD-10-CM

## 2025-05-29 DIAGNOSIS — K51.919 ULCERATIVE COLITIS WITH COMPLICATION, UNSPECIFIED LOCATION: ICD-10-CM

## 2025-05-29 DIAGNOSIS — I25.119 CORONARY ARTERY DISEASE INVOLVING NATIVE CORONARY ARTERY OF NATIVE HEART WITH ANGINA PECTORIS: ICD-10-CM

## 2025-05-29 DIAGNOSIS — I10 ESSENTIAL HYPERTENSION: ICD-10-CM

## 2025-05-29 LAB
ABSOLUTE EOSINOPHIL (OHS): 0.26 K/UL
ABSOLUTE MONOCYTE (OHS): 0.67 K/UL (ref 0.3–1)
ABSOLUTE NEUTROPHIL COUNT (OHS): 5.35 K/UL (ref 1.8–7.7)
ALBUMIN SERPL BCP-MCNC: 4 G/DL (ref 3.5–5.2)
ALBUMIN/CREAT UR: NORMAL
ALP SERPL-CCNC: 62 UNIT/L (ref 40–150)
ALT SERPL W/O P-5'-P-CCNC: 15 UNIT/L (ref 10–44)
ANION GAP (OHS): 8 MMOL/L (ref 8–16)
AST SERPL-CCNC: 13 UNIT/L (ref 11–45)
BASOPHILS # BLD AUTO: 0.05 K/UL
BASOPHILS NFR BLD AUTO: 0.6 %
BILIRUB SERPL-MCNC: 0.5 MG/DL (ref 0.1–1)
BUN SERPL-MCNC: 12 MG/DL (ref 8–23)
CALCIUM SERPL-MCNC: 8.7 MG/DL (ref 8.7–10.5)
CHLORIDE SERPL-SCNC: 105 MMOL/L (ref 95–110)
CHOLEST SERPL-MCNC: 121 MG/DL (ref 120–199)
CHOLEST/HDLC SERPL: 3.4 {RATIO} (ref 2–5)
CO2 SERPL-SCNC: 24 MMOL/L (ref 23–29)
CREAT SERPL-MCNC: 0.7 MG/DL (ref 0.5–1.4)
CREAT UR-MCNC: 31 MG/DL (ref 23–375)
EAG (OHS): 157 MG/DL (ref 68–131)
ERYTHROCYTE [DISTWIDTH] IN BLOOD BY AUTOMATED COUNT: 12.8 % (ref 11.5–14.5)
GFR SERPLBLD CREATININE-BSD FMLA CKD-EPI: >60 ML/MIN/1.73/M2
GLUCOSE SERPL-MCNC: 118 MG/DL (ref 70–110)
HBA1C MFR BLD: 7.1 % (ref 4–5.6)
HCT VFR BLD AUTO: 38.1 % (ref 40–54)
HDLC SERPL-MCNC: 36 MG/DL (ref 40–75)
HDLC SERPL: 29.8 % (ref 20–50)
HGB BLD-MCNC: 11.9 GM/DL (ref 14–18)
IMM GRANULOCYTES # BLD AUTO: 0.03 K/UL (ref 0–0.04)
IMM GRANULOCYTES NFR BLD AUTO: 0.4 % (ref 0–0.5)
LDLC SERPL CALC-MCNC: 65.6 MG/DL (ref 63–159)
LYMPHOCYTES # BLD AUTO: 1.97 K/UL (ref 1–4.8)
MCH RBC QN AUTO: 27.2 PG (ref 27–31)
MCHC RBC AUTO-ENTMCNC: 31.2 G/DL (ref 32–36)
MCV RBC AUTO: 87 FL (ref 82–98)
MICROALBUMIN UR-MCNC: <5 UG/ML (ref ?–5000)
NONHDLC SERPL-MCNC: 85 MG/DL
NUCLEATED RBC (/100WBC) (OHS): 0 /100 WBC
PLATELET # BLD AUTO: 260 K/UL (ref 150–450)
PMV BLD AUTO: 11.2 FL (ref 9.2–12.9)
POTASSIUM SERPL-SCNC: 4 MMOL/L (ref 3.5–5.1)
PROT SERPL-MCNC: 7 GM/DL (ref 6–8.4)
PSA SERPL-MCNC: 0.71 NG/ML
RBC # BLD AUTO: 4.37 M/UL (ref 4.6–6.2)
RELATIVE EOSINOPHIL (OHS): 3.1 %
RELATIVE LYMPHOCYTE (OHS): 23.6 % (ref 18–48)
RELATIVE MONOCYTE (OHS): 8 % (ref 4–15)
RELATIVE NEUTROPHIL (OHS): 64.3 % (ref 38–73)
SODIUM SERPL-SCNC: 137 MMOL/L (ref 136–145)
TRIGL SERPL-MCNC: 97 MG/DL (ref 30–150)
WBC # BLD AUTO: 8.33 K/UL (ref 3.9–12.7)

## 2025-05-29 PROCEDURE — 84153 ASSAY OF PSA TOTAL: CPT

## 2025-05-29 PROCEDURE — 85025 COMPLETE CBC W/AUTO DIFF WBC: CPT

## 2025-05-29 PROCEDURE — 80053 COMPREHEN METABOLIC PANEL: CPT

## 2025-05-29 PROCEDURE — 80061 LIPID PANEL: CPT

## 2025-05-29 PROCEDURE — 83036 HEMOGLOBIN GLYCOSYLATED A1C: CPT

## 2025-05-29 PROCEDURE — 36415 COLL VENOUS BLD VENIPUNCTURE: CPT | Mod: PN

## 2025-05-29 PROCEDURE — 82043 UR ALBUMIN QUANTITATIVE: CPT

## 2025-05-30 ENCOUNTER — OFFICE VISIT (OUTPATIENT)
Dept: FAMILY MEDICINE | Facility: CLINIC | Age: 65
End: 2025-05-30
Payer: MEDICARE

## 2025-05-30 ENCOUNTER — RESULTS FOLLOW-UP (OUTPATIENT)
Dept: FAMILY MEDICINE | Facility: CLINIC | Age: 65
End: 2025-05-30

## 2025-05-30 VITALS
TEMPERATURE: 98 F | SYSTOLIC BLOOD PRESSURE: 120 MMHG | BODY MASS INDEX: 24.41 KG/M2 | WEIGHT: 151.88 LBS | OXYGEN SATURATION: 100 % | HEIGHT: 66 IN | HEART RATE: 71 BPM | DIASTOLIC BLOOD PRESSURE: 72 MMHG

## 2025-05-30 DIAGNOSIS — E11.69 TYPE 2 DIABETES MELLITUS WITH OTHER SPECIFIED COMPLICATION, WITHOUT LONG-TERM CURRENT USE OF INSULIN: Primary | ICD-10-CM

## 2025-05-30 DIAGNOSIS — D64.9 ANEMIA, UNSPECIFIED TYPE: ICD-10-CM

## 2025-05-30 DIAGNOSIS — E78.2 MIXED HYPERLIPIDEMIA: ICD-10-CM

## 2025-05-30 DIAGNOSIS — I10 ESSENTIAL HYPERTENSION: ICD-10-CM

## 2025-05-30 DIAGNOSIS — K51.919 ULCERATIVE COLITIS WITH COMPLICATION, UNSPECIFIED LOCATION: ICD-10-CM

## 2025-05-30 DIAGNOSIS — Z12.5 SCREENING FOR MALIGNANT NEOPLASM OF PROSTATE: ICD-10-CM

## 2025-05-30 DIAGNOSIS — Z79.899 OTHER LONG TERM (CURRENT) DRUG THERAPY: ICD-10-CM

## 2025-05-30 PROCEDURE — 99999 PR PBB SHADOW E&M-EST. PATIENT-LVL IV: CPT | Mod: PBBFAC,,, | Performed by: FAMILY MEDICINE

## 2025-05-30 RX ORDER — DICLOFENAC SODIUM 10 MG/G
GEL TOPICAL
Qty: 100 G | Refills: 4 | Status: SHIPPED | OUTPATIENT
Start: 2025-05-30

## 2025-05-30 NOTE — PROGRESS NOTES
(Portions of this note were dictated using voice recognition software and may contain dictation related errors in spelling/grammar/syntax not found on text review)         Chief Complaint   Patient presents with    Annual Exam       HPI: 65 y.o. male       Diabetes, on metformin  2 pills am/2 pill pm.  glipizide 10 mg twice a day, Januvia 100 mg daily, Jardiance 10 mg daily   Tried to switch him to weekly G LP 1 agonist with Trulicity but apparently this was not covered and recommendation was to try bydureon, Victoza, or Byetta 1st.  Tried later   Bydureon weekly but apparently had some local allergic reaction with injections.  Had advised on potential of Victoza although he did not seem interested in doing a daily injection.  Ended up going to Mounjaro but even at 2.5 mg had significant appetite loss and did not feel well so he got off of it after 1 injection.      A1c 7.6 - 7.1 - 7.0-7.5--7.2--7.1    Does not take aspirin because of ALLERGY.    eye exam :   (Dr. Rapp)  Sees a dentist regularly.    No neuropathy symptoms     CAD:  Presentation march 2017 with angina. CAD on angio, no revasculariability. Medically managed with statin (was on Lipitor 80 but given suboptimal LDL switched over to Crestor 40), plavix, metoprolol suc 25 daily, losartan 100 mg daily, imdur 30 mg daily.  Had a nuclear stress test done last in December 2, 2020. Normal perfusion scan.  EF normal at rest and with stress.  Last cardiology visit was on 11/05/2020     Hypertension on losartan 100 mg daily,metoprolol 50 mg daily,Amlodipine 5 mg daily .  Was wondering if you should cut down in his meds, has not really checked his blood pressure regularly at home.  BP is 120/72, stable at this visit.      Ulcerative colitis, prior followed by GI. Currently not taking mesalamine.  Colonoscopy recently showed pan colitis.  Had discussed with GI possibility of restarting mesalamine although he was reporting fair control of symptoms and is  not currently on it    Omeprazole PRN for GERD sx    Chronic left knee pain, no recent injury.  No swelling of the knee.  Does get stiff sometimes.  X-ray in June showed calcification along MCL suggesting ligamentous injury.  Medial compartment narrowing  left greater than right, moderate left-sided suprapatellar joint effusion and superior and inferior patellar articular surface spurring.  Had given topical diclofenac gel, had discussed potential physical therapy.   Went to Orthopedics, was given steroid shot which helped.  However has told that he has severe osteoarthritis and will ultimately need a knee replacement.     Mild stable anemia, has had normal B12 testing in the past, had low iron at 1 time but not currently.  Is vegetarian but not vegan    Does get some neck pain off and on. Recently had a long drive and felt some stiffness after. No neuropathic sx.  Was given methocarbamol PRN    Past Medical History:   Diagnosis Date    Coronary artery disease involving native coronary artery of native heart without angina pectoris 7/29/2017    Diabetes mellitus, type II     HTN (hypertension)     Mixed hyperlipidemia 10/24/2019    Ulcerative colitis        Past Surgical History:   Procedure Laterality Date    CATARACT EXTRACTION Bilateral 2013    COLONOSCOPY N/A 01/22/2016    Procedure: COLONOSCOPY;  Surgeon: Aristeo Lynn Jr., MD;  Location: [facility];  Service: Endoscopy;  Laterality: N/A;    COLONOSCOPY N/A 10/13/2020    Procedure: COLONOSCOPY;  Surgeon: Charly Travis MD;  Location: [facility];  Service: Endoscopy;  Laterality: N/A;    COLONOSCOPY N/A 05/03/2021    Procedure: COLONOSCOPY;  Surgeon: Charly Travis MD;  Location: [facility];  Service: Endoscopy;  Laterality: N/A;    COLONOSCOPY, SCREENING, HIGH RISK PATIENT N/A 3/31/2025    Procedure: COLONOSCOPY, SCREENING, HIGH RISK PATIENT;  Surgeon: Lucas Bloom MD;  Location: [facility];  Service: Endoscopy;  Laterality: N/A;    WRIST  FRACTURE SURGERY Right     plate and screws       Family History   Problem Relation Name Age of Onset    Coronary artery disease Mother  75    Coronary artery disease Father  55    Coronary artery disease Brother  59    Diabetes Brother      Prostate cancer Neg Hx      Colon cancer Neg Hx         Social History     Tobacco Use    Smoking status: Former     Current packs/day: 0.00     Types: Cigarettes     Quit date: 3/16/2008     Years since quittin.2    Smokeless tobacco: Former     Quit date: 2009   Substance Use Topics    Alcohol use: Not Currently     Alcohol/week: 2.0 standard drinks of alcohol     Types: 1 Shots of liquor, 1 Glasses of wine per week     Comment: occasionally    Drug use: No       Lab Results   Component Value Date    WBC 8.33 2025    HGB 11.9 (L) 2025    HCT 38.1 (L) 2025    MCV 87 2025     2025    CHOL 121 2025    TRIG 97 2025    HDL 36 (L) 2025    ALT 15 2025    AST 13 2025    BILITOT 0.5 2025    ALKPHOS 62 2025     2025    K 4.0 2025     2025    CREATININE 0.7 2025    ESTGFRAFRICA >60.0 2022    EGFRNONAA >60.0 2022    CALCIUM 8.7 2025    ALBUMIN 4.0 2025    BUN 12 2025    CO2 24 2025    TSH 2.519 2021    PSA 0.71 2025    INR 1.0 2022    HGBA1C 7.1 (H) 2025    MICALBCREAT  2025      Comment:      UNABLE TO CALCULATE    LDLCALC 65.6 2025     (H) 2025       Hemoglobin A1C (%)   Date Value   2025 7.2 (H)   10/08/2024 7.5 (H)   2024 7.0 (H)   2024 7.1 (H)   2023 7.6 (H)   2023 7.4 (H)   2023 7.2 (H)   2023 7.8 (H)   2022 7.1 (H)   2022 7.6 (H)   2022 6.9 (H)     Hemoglobin A1c (%)   Date Value   2025 7.1 (H)       Vital signs reviewed     PE:   APPEARANCE: Well nourished, well developed, in no acute distress.    HEAD:  Normocephalic, atraumatic.  NECK: Supple with no cervical lymphadenopathy.  No carotid bruits.  No thyromegaly  CHEST: Good inspiratory effort. Lungs clear to auscultation with no wheezes or crackles.  CARDIOVASCULAR: Normal S1, S2. No rubs, murmurs, or gallops.  ABDOMEN: Bowel sounds normal. Not distended. Soft. No tenderness or masses. No organomegaly.  EXTREMITIES: No edema   DIABETIC FOOT EXAM: Protective Sensation (w/ 10 gram monofilament):  Right: Intact  Left: Intact    Visual Inspection:  Normal -  Bilateral    Pedal Pulses:   Right: Present  Left: Present    Posterior Tibialis Pulses:   Right:Present  Left: Present                        IMPRESSION  Encounter Diagnoses   Name Primary?    Type 2 diabetes mellitus with other specified complication, without long-term current use of insulin Yes    Anemia, unspecified type     Essential hypertension     Mixed hyperlipidemia     Ulcerative colitis with complication, unspecified location     Screening for malignant neoplasm of prostate     Other long term (current) drug therapy                            PLAN    DM:  For now will continue with current therapy with metformin 2 g daily, glipizide 10 b.i.d., Januvia 100 mg daily, Jardiance 10 mg daily.  A1c is down trending.  Attention to diet, follow up labs in three-month with visit after    HLD: controlled.     CAD stable, no issues    HTN controlled   .  Was asking whether he should go down his blood pressure medication.  Right now I would say his blood pressure is fairly well-controlled and given his cardiac history I think it is reasonable to stay on what he is on.  He can opt to check his blood pressure more regularly at home and report at next visit and if blood pressures are consistently stable/low normal we could potentially down titrate his amlodipine.  We discussed benefit of being on Arb and beta-blocker for his cardiac disease but amlodipine could potentially be modified if we need as long as blood  pressure stays stable.  Can report at next visit.    UC:  Stable clinically, not, moderate inflammatory disease noted on recent colonoscopy, follow up with GI as recommended .    Mild anemia, continue to follow     Follows with orthopedics for his knee osteoarthritis.  Does not need diclofenac gel refill, provided           F/u 3 mo with labs prior   Orders Placed This Encounter   Procedures    CBC Auto Differential    Comprehensive Metabolic Panel    Hemoglobin A1C    Lipid Panel     There are no Patient Instructions on file for this visit.        HEALTH SCREENINGS   Immunizations:  Pneumovax   zoster:  Up-to-date  Tetanus  22  COVID vaccine (J/J) 2021.  Pfizer booster on 2021, encourage booster vaccine.,   PCV20 utd  Flu utd     Age/Gender Appropriate screenings:  Colonoscopy in :  Nonbleeding internal hemorrhoids, diffuse scattered and patchy moderate inflammation found in proximal descending colon, transverse colon, and ascending colon secondary to pancolitis.  No malignancy on biopsies  Prostate screenin2025  AAA screen negative 2023

## 2025-06-07 DIAGNOSIS — E11.69 TYPE 2 DIABETES MELLITUS WITH OTHER SPECIFIED COMPLICATION, WITHOUT LONG-TERM CURRENT USE OF INSULIN: ICD-10-CM

## 2025-06-07 NOTE — TELEPHONE ENCOUNTER
No care due was identified.  [facility] Embedded Care Due Messages. Reference number: [id number].   6/07/2025 1:01:23 PM CDT

## 2025-06-08 RX ORDER — LOSARTAN POTASSIUM 100 MG/1
100 TABLET ORAL DAILY
Qty: 90 TABLET | Refills: 3 | Status: SHIPPED | OUTPATIENT
Start: 2025-06-08

## 2025-06-08 RX ORDER — GLIPIZIDE 10 MG/1
10 TABLET ORAL
Qty: 180 TABLET | Refills: 1 | Status: SHIPPED | OUTPATIENT
Start: 2025-06-08

## 2025-06-08 NOTE — TELEPHONE ENCOUNTER
Refill Decision Note   Michoacano Lind  is requesting a refill authorization.  Brief Assessment and Rationale for Refill:  Approve     Medication Therapy Plan:         Comments:     Note composed:1:52 PM 06/08/2025             Appointments     Last Visit   5/30/2025 Facundo Alexis MD   Next Visit   9/4/2025 Facundo Alexis MD

## 2025-06-24 ENCOUNTER — TELEPHONE (OUTPATIENT)
Dept: OPHTHALMOLOGY | Facility: CLINIC | Age: 65
End: 2025-06-24
Payer: MEDICARE

## 2025-06-24 NOTE — TELEPHONE ENCOUNTER
Copied from CRM #[id number]. Topic: General Inquiry - Return Call  >> Jun 24, 2025 11:26 AM Kadie wrote:  Type:  Patient Returning Call    Who Called:  pt   Who Left Message for Patient:  ma   Does the patient know what this is regarding?:  yes   Best Call Back Number:  [phone] (home)    Additional Information:  please advise

## 2025-06-24 NOTE — TELEPHONE ENCOUNTER
Copied from CRM #[id number]. Topic: Appointments - Appointment Access  >> Jun 24, 2025 10:48 AM Linda wrote:  Type:  Sooner Appointment Request    Caller is requesting a sooner appointment.  Caller declined first available appointment listed below.  Caller will not accept being placed on the waitlist and is requesting a message be sent to doctor.    Name of Caller:  the patient  When is the first available appointment?  N/a     Best Call Back Number:  [phone]  Additional Information:  pt states a few days ago while he was cutting grass his vision became blurry for a few hours and then it went back to normal, pt states he had cataract surgery years ago and is wondering if he needs to be checked by specialist, pt went to [facility] for annual check up and was told all is well. Please call pt to advise

## 2025-06-26 RX ORDER — AMLODIPINE BESYLATE 5 MG/1
5 TABLET ORAL
Qty: 90 TABLET | Refills: 3 | Status: SHIPPED | OUTPATIENT
Start: 2025-06-26

## 2025-06-26 NOTE — TELEPHONE ENCOUNTER
Refill Decision Note   Michoacano Lelo  is requesting a refill authorization.  Brief Assessment and Rationale for Refill:  Approve     Medication Therapy Plan:        Comments:     Note composed:12:58 PM 06/26/2025

## 2025-06-26 NOTE — TELEPHONE ENCOUNTER
No care due was identified.  [facility] Embedded Care Due Messages. Reference number: [id number].   6/26/2025 11:23:23 AM CDT

## 2025-07-03 RX ORDER — METOPROLOL SUCCINATE 50 MG/1
50 TABLET, EXTENDED RELEASE ORAL
Qty: 90 TABLET | Refills: 3 | Status: SHIPPED | OUTPATIENT
Start: 2025-07-03

## 2025-07-03 NOTE — TELEPHONE ENCOUNTER
No care due was identified.  [facility] Embedded Care Due Messages. Reference number: [id number].   7/03/2025 9:31:10 AM CDT

## 2025-07-03 NOTE — TELEPHONE ENCOUNTER
Refill Decision Note   Michoacano Lelo  is requesting a refill authorization.  Brief Assessment and Rationale for Refill:  Approve     Medication Therapy Plan:         Comments:     Note composed:3:25 PM 07/03/2025

## 2025-08-11 RX ORDER — METFORMIN HYDROCHLORIDE 500 MG/1
TABLET, EXTENDED RELEASE ORAL
Qty: 360 TABLET | Refills: 1 | Status: SHIPPED | OUTPATIENT
Start: 2025-08-11

## 2025-08-29 ENCOUNTER — LAB VISIT (OUTPATIENT)
Dept: LAB | Facility: HOSPITAL | Age: 65
End: 2025-08-29
Attending: FAMILY MEDICINE
Payer: MEDICARE

## 2025-08-29 DIAGNOSIS — E78.2 MIXED HYPERLIPIDEMIA: ICD-10-CM

## 2025-08-29 DIAGNOSIS — K51.919 ULCERATIVE COLITIS WITH COMPLICATION, UNSPECIFIED LOCATION: ICD-10-CM

## 2025-08-29 DIAGNOSIS — I10 ESSENTIAL HYPERTENSION: ICD-10-CM

## 2025-08-29 DIAGNOSIS — Z79.899 OTHER LONG TERM (CURRENT) DRUG THERAPY: ICD-10-CM

## 2025-08-29 DIAGNOSIS — E11.69 TYPE 2 DIABETES MELLITUS WITH OTHER SPECIFIED COMPLICATION, WITHOUT LONG-TERM CURRENT USE OF INSULIN: ICD-10-CM

## 2025-08-29 DIAGNOSIS — D64.9 ANEMIA, UNSPECIFIED TYPE: ICD-10-CM

## 2025-08-29 LAB
ABSOLUTE EOSINOPHIL (OHS): 0.41 K/UL
ABSOLUTE MONOCYTE (OHS): 0.7 K/UL (ref 0.3–1)
ABSOLUTE NEUTROPHIL COUNT (OHS): 6.42 K/UL (ref 1.8–7.7)
ALBUMIN SERPL BCP-MCNC: 3.9 G/DL (ref 3.5–5.2)
ALP SERPL-CCNC: 67 UNIT/L (ref 40–150)
ALT SERPL W/O P-5'-P-CCNC: 21 UNIT/L (ref 0–55)
ANION GAP (OHS): 8 MMOL/L (ref 8–16)
AST SERPL-CCNC: 19 UNIT/L (ref 0–50)
BASOPHILS # BLD AUTO: 0.1 K/UL
BASOPHILS NFR BLD AUTO: 1 %
BILIRUB SERPL-MCNC: 0.4 MG/DL (ref 0.1–1)
BUN SERPL-MCNC: 14 MG/DL (ref 8–23)
CALCIUM SERPL-MCNC: 8.9 MG/DL (ref 8.7–10.5)
CHLORIDE SERPL-SCNC: 104 MMOL/L (ref 95–110)
CHOLEST SERPL-MCNC: 113 MG/DL (ref 120–199)
CHOLEST/HDLC SERPL: 3.1 {RATIO} (ref 2–5)
CO2 SERPL-SCNC: 22 MMOL/L (ref 23–29)
CREAT SERPL-MCNC: 0.7 MG/DL (ref 0.5–1.4)
EAG (OHS): 148 MG/DL (ref 68–131)
ERYTHROCYTE [DISTWIDTH] IN BLOOD BY AUTOMATED COUNT: 12.7 % (ref 11.5–14.5)
GFR SERPLBLD CREATININE-BSD FMLA CKD-EPI: >60 ML/MIN/1.73/M2
GLUCOSE SERPL-MCNC: 124 MG/DL (ref 70–110)
HBA1C MFR BLD: 6.8 % (ref 4–5.6)
HCT VFR BLD AUTO: 36 % (ref 40–54)
HDLC SERPL-MCNC: 37 MG/DL (ref 40–75)
HDLC SERPL: 32.7 % (ref 20–50)
HGB BLD-MCNC: 11.5 GM/DL (ref 14–18)
IMM GRANULOCYTES # BLD AUTO: 0.03 K/UL (ref 0–0.04)
IMM GRANULOCYTES NFR BLD AUTO: 0.3 % (ref 0–0.5)
LDLC SERPL CALC-MCNC: 51.8 MG/DL (ref 63–159)
LYMPHOCYTES # BLD AUTO: 2.38 K/UL (ref 1–4.8)
MCH RBC QN AUTO: 27.3 PG (ref 27–31)
MCHC RBC AUTO-ENTMCNC: 31.9 G/DL (ref 32–36)
MCV RBC AUTO: 85 FL (ref 82–98)
NONHDLC SERPL-MCNC: 76 MG/DL
NUCLEATED RBC (/100WBC) (OHS): 0 /100 WBC
PLATELET # BLD AUTO: 244 K/UL (ref 150–450)
PMV BLD AUTO: 11.1 FL (ref 9.2–12.9)
POTASSIUM SERPL-SCNC: 4.2 MMOL/L (ref 3.5–5.1)
PROT SERPL-MCNC: 7.1 GM/DL (ref 6–8.4)
RBC # BLD AUTO: 4.22 M/UL (ref 4.6–6.2)
RELATIVE EOSINOPHIL (OHS): 4.1 %
RELATIVE LYMPHOCYTE (OHS): 23.7 % (ref 18–48)
RELATIVE MONOCYTE (OHS): 7 % (ref 4–15)
RELATIVE NEUTROPHIL (OHS): 63.9 % (ref 38–73)
SODIUM SERPL-SCNC: 134 MMOL/L (ref 136–145)
TRIGL SERPL-MCNC: 121 MG/DL (ref 30–150)
WBC # BLD AUTO: 10.04 K/UL (ref 3.9–12.7)

## 2025-08-29 PROCEDURE — 80053 COMPREHEN METABOLIC PANEL: CPT

## 2025-08-29 PROCEDURE — 80061 LIPID PANEL: CPT

## 2025-08-29 PROCEDURE — 36415 COLL VENOUS BLD VENIPUNCTURE: CPT | Mod: PN

## 2025-08-29 PROCEDURE — 85025 COMPLETE CBC W/AUTO DIFF WBC: CPT

## 2025-08-29 PROCEDURE — 83036 HEMOGLOBIN GLYCOSYLATED A1C: CPT

## 2025-09-04 ENCOUNTER — OFFICE VISIT (OUTPATIENT)
Dept: FAMILY MEDICINE | Facility: CLINIC | Age: 65
End: 2025-09-04
Payer: MEDICARE

## 2025-09-04 VITALS
TEMPERATURE: 98 F | SYSTOLIC BLOOD PRESSURE: 120 MMHG | HEART RATE: 61 BPM | BODY MASS INDEX: 24.7 KG/M2 | HEIGHT: 66 IN | DIASTOLIC BLOOD PRESSURE: 66 MMHG | WEIGHT: 153.69 LBS | OXYGEN SATURATION: 97 %

## 2025-09-04 DIAGNOSIS — D64.9 ANEMIA, UNSPECIFIED TYPE: Primary | ICD-10-CM

## 2025-09-04 DIAGNOSIS — K51.919 ULCERATIVE COLITIS WITH COMPLICATION, UNSPECIFIED LOCATION: ICD-10-CM

## 2025-09-04 DIAGNOSIS — E78.2 MIXED HYPERLIPIDEMIA: ICD-10-CM

## 2025-09-04 DIAGNOSIS — I10 ESSENTIAL HYPERTENSION: ICD-10-CM

## 2025-09-04 DIAGNOSIS — Z79.899 OTHER LONG TERM (CURRENT) DRUG THERAPY: ICD-10-CM

## 2025-09-04 DIAGNOSIS — E11.69 TYPE 2 DIABETES MELLITUS WITH OTHER SPECIFIED COMPLICATION, WITHOUT LONG-TERM CURRENT USE OF INSULIN: ICD-10-CM

## 2025-09-04 PROCEDURE — 99214 OFFICE O/P EST MOD 30 MIN: CPT | Mod: S$GLB,,, | Performed by: FAMILY MEDICINE

## 2025-09-04 PROCEDURE — 3066F NEPHROPATHY DOC TX: CPT | Mod: CPTII,S$GLB,, | Performed by: FAMILY MEDICINE

## 2025-09-04 PROCEDURE — 1159F MED LIST DOCD IN RCRD: CPT | Mod: CPTII,S$GLB,, | Performed by: FAMILY MEDICINE

## 2025-09-04 PROCEDURE — 1160F RVW MEDS BY RX/DR IN RCRD: CPT | Mod: CPTII,S$GLB,, | Performed by: FAMILY MEDICINE

## 2025-09-04 PROCEDURE — 99999 PR PBB SHADOW E&M-EST. PATIENT-LVL V: CPT | Mod: PBBFAC,,, | Performed by: FAMILY MEDICINE

## 2025-09-04 PROCEDURE — 1101F PT FALLS ASSESS-DOCD LE1/YR: CPT | Mod: CPTII,S$GLB,, | Performed by: FAMILY MEDICINE

## 2025-09-04 PROCEDURE — 1126F AMNT PAIN NOTED NONE PRSNT: CPT | Mod: CPTII,S$GLB,, | Performed by: FAMILY MEDICINE

## 2025-09-04 PROCEDURE — 3078F DIAST BP <80 MM HG: CPT | Mod: CPTII,S$GLB,, | Performed by: FAMILY MEDICINE

## 2025-09-04 PROCEDURE — G2211 COMPLEX E/M VISIT ADD ON: HCPCS | Mod: S$GLB,,, | Performed by: FAMILY MEDICINE

## 2025-09-04 PROCEDURE — 4010F ACE/ARB THERAPY RXD/TAKEN: CPT | Mod: CPTII,S$GLB,, | Performed by: FAMILY MEDICINE

## 2025-09-04 PROCEDURE — 3288F FALL RISK ASSESSMENT DOCD: CPT | Mod: CPTII,S$GLB,, | Performed by: FAMILY MEDICINE

## 2025-09-04 PROCEDURE — 3008F BODY MASS INDEX DOCD: CPT | Mod: CPTII,S$GLB,, | Performed by: FAMILY MEDICINE

## 2025-09-04 PROCEDURE — 3044F HG A1C LEVEL LT 7.0%: CPT | Mod: CPTII,S$GLB,, | Performed by: FAMILY MEDICINE

## 2025-09-04 PROCEDURE — 3061F NEG MICROALBUMINURIA REV: CPT | Mod: CPTII,S$GLB,, | Performed by: FAMILY MEDICINE

## 2025-09-04 PROCEDURE — 3074F SYST BP LT 130 MM HG: CPT | Mod: CPTII,S$GLB,, | Performed by: FAMILY MEDICINE
